# Patient Record
Sex: FEMALE | Race: OTHER | NOT HISPANIC OR LATINO | URBAN - METROPOLITAN AREA
[De-identification: names, ages, dates, MRNs, and addresses within clinical notes are randomized per-mention and may not be internally consistent; named-entity substitution may affect disease eponyms.]

---

## 2022-10-10 ENCOUNTER — INPATIENT (INPATIENT)
Facility: HOSPITAL | Age: 72
LOS: 7 days | Discharge: HOME HEALTH | End: 2022-10-18
Attending: INTERNAL MEDICINE | Admitting: INTERNAL MEDICINE
Payer: MEDICARE

## 2022-10-10 VITALS
RESPIRATION RATE: 17 BRPM | TEMPERATURE: 97 F | HEART RATE: 90 BPM | OXYGEN SATURATION: 93 % | SYSTOLIC BLOOD PRESSURE: 185 MMHG | DIASTOLIC BLOOD PRESSURE: 98 MMHG

## 2022-10-10 LAB
ALBUMIN SERPL ELPH-MCNC: 3.8 G/DL — SIGNIFICANT CHANGE UP (ref 3.3–5)
ALP SERPL-CCNC: 97 U/L — SIGNIFICANT CHANGE UP (ref 40–120)
ALT FLD-CCNC: 19 U/L — SIGNIFICANT CHANGE UP (ref 4–33)
ANION GAP SERPL CALC-SCNC: 10 MMOL/L — SIGNIFICANT CHANGE UP (ref 7–14)
APTT BLD: 30.2 SEC — SIGNIFICANT CHANGE UP (ref 27–36.3)
AST SERPL-CCNC: 20 U/L — SIGNIFICANT CHANGE UP (ref 4–32)
B PERT DNA SPEC QL NAA+PROBE: SIGNIFICANT CHANGE UP
B PERT+PARAPERT DNA PNL SPEC NAA+PROBE: SIGNIFICANT CHANGE UP
BASE EXCESS BLDV CALC-SCNC: 5.3 MMOL/L — HIGH (ref -2–3)
BASOPHILS # BLD AUTO: 0.03 K/UL — SIGNIFICANT CHANGE UP (ref 0–0.2)
BASOPHILS NFR BLD AUTO: 0.4 % — SIGNIFICANT CHANGE UP (ref 0–2)
BILIRUB SERPL-MCNC: 0.3 MG/DL — SIGNIFICANT CHANGE UP (ref 0.2–1.2)
BLOOD GAS VENOUS COMPREHENSIVE RESULT: SIGNIFICANT CHANGE UP
BORDETELLA PARAPERTUSSIS (RAPRVP): SIGNIFICANT CHANGE UP
BUN SERPL-MCNC: 14 MG/DL — SIGNIFICANT CHANGE UP (ref 7–23)
C PNEUM DNA SPEC QL NAA+PROBE: SIGNIFICANT CHANGE UP
CALCIUM SERPL-MCNC: 9.2 MG/DL — SIGNIFICANT CHANGE UP (ref 8.4–10.5)
CHLORIDE BLDV-SCNC: 99 MMOL/L — SIGNIFICANT CHANGE UP (ref 96–108)
CHLORIDE SERPL-SCNC: 101 MMOL/L — SIGNIFICANT CHANGE UP (ref 98–107)
CO2 BLDV-SCNC: 33.9 MMOL/L — HIGH (ref 22–26)
CO2 SERPL-SCNC: 29 MMOL/L — SIGNIFICANT CHANGE UP (ref 22–31)
CREAT SERPL-MCNC: 0.87 MG/DL — SIGNIFICANT CHANGE UP (ref 0.5–1.3)
EGFR: 71 ML/MIN/1.73M2 — SIGNIFICANT CHANGE UP
EOSINOPHIL # BLD AUTO: 0.2 K/UL — SIGNIFICANT CHANGE UP (ref 0–0.5)
EOSINOPHIL NFR BLD AUTO: 2.4 % — SIGNIFICANT CHANGE UP (ref 0–6)
FLUAV SUBTYP SPEC NAA+PROBE: SIGNIFICANT CHANGE UP
FLUBV RNA SPEC QL NAA+PROBE: SIGNIFICANT CHANGE UP
GAS PNL BLDV: 137 MMOL/L — SIGNIFICANT CHANGE UP (ref 136–145)
GAS PNL BLDV: SIGNIFICANT CHANGE UP
GLUCOSE BLDV-MCNC: 182 MG/DL — HIGH (ref 70–99)
GLUCOSE SERPL-MCNC: 184 MG/DL — HIGH (ref 70–99)
HADV DNA SPEC QL NAA+PROBE: SIGNIFICANT CHANGE UP
HCO3 BLDV-SCNC: 32 MMOL/L — HIGH (ref 22–29)
HCOV 229E RNA SPEC QL NAA+PROBE: SIGNIFICANT CHANGE UP
HCOV HKU1 RNA SPEC QL NAA+PROBE: SIGNIFICANT CHANGE UP
HCOV NL63 RNA SPEC QL NAA+PROBE: SIGNIFICANT CHANGE UP
HCOV OC43 RNA SPEC QL NAA+PROBE: SIGNIFICANT CHANGE UP
HCT VFR BLD CALC: 38.3 % — SIGNIFICANT CHANGE UP (ref 34.5–45)
HCT VFR BLDA CALC: 37 % — SIGNIFICANT CHANGE UP (ref 34.5–46.5)
HGB BLD CALC-MCNC: 12.3 G/DL — SIGNIFICANT CHANGE UP (ref 11.5–15.5)
HGB BLD-MCNC: 11.7 G/DL — SIGNIFICANT CHANGE UP (ref 11.5–15.5)
HMPV RNA SPEC QL NAA+PROBE: SIGNIFICANT CHANGE UP
HPIV1 RNA SPEC QL NAA+PROBE: SIGNIFICANT CHANGE UP
HPIV2 RNA SPEC QL NAA+PROBE: SIGNIFICANT CHANGE UP
HPIV3 RNA SPEC QL NAA+PROBE: SIGNIFICANT CHANGE UP
HPIV4 RNA SPEC QL NAA+PROBE: SIGNIFICANT CHANGE UP
IANC: 5.51 K/UL — SIGNIFICANT CHANGE UP (ref 1.8–7.4)
IMM GRANULOCYTES NFR BLD AUTO: 0.5 % — SIGNIFICANT CHANGE UP (ref 0–0.9)
INR BLD: 1.03 RATIO — SIGNIFICANT CHANGE UP (ref 0.88–1.16)
LACTATE BLDV-MCNC: 1.5 MMOL/L — SIGNIFICANT CHANGE UP (ref 0.5–2)
LIDOCAIN IGE QN: 38 U/L — SIGNIFICANT CHANGE UP (ref 7–60)
LYMPHOCYTES # BLD AUTO: 1.7 K/UL — SIGNIFICANT CHANGE UP (ref 1–3.3)
LYMPHOCYTES # BLD AUTO: 20.8 % — SIGNIFICANT CHANGE UP (ref 13–44)
M PNEUMO DNA SPEC QL NAA+PROBE: SIGNIFICANT CHANGE UP
MCHC RBC-ENTMCNC: 26.8 PG — LOW (ref 27–34)
MCHC RBC-ENTMCNC: 30.5 GM/DL — LOW (ref 32–36)
MCV RBC AUTO: 87.6 FL — SIGNIFICANT CHANGE UP (ref 80–100)
MONOCYTES # BLD AUTO: 0.69 K/UL — SIGNIFICANT CHANGE UP (ref 0–0.9)
MONOCYTES NFR BLD AUTO: 8.4 % — SIGNIFICANT CHANGE UP (ref 2–14)
NEUTROPHILS # BLD AUTO: 5.51 K/UL — SIGNIFICANT CHANGE UP (ref 1.8–7.4)
NEUTROPHILS NFR BLD AUTO: 67.5 % — SIGNIFICANT CHANGE UP (ref 43–77)
NRBC # BLD: 0 /100 WBCS — SIGNIFICANT CHANGE UP (ref 0–0)
NRBC # FLD: 0 K/UL — SIGNIFICANT CHANGE UP (ref 0–0)
NT-PROBNP SERPL-SCNC: 1206 PG/ML — HIGH
PCO2 BLDV: 57 MMHG — HIGH (ref 39–42)
PH BLDV: 7.36 — SIGNIFICANT CHANGE UP (ref 7.32–7.43)
PLATELET # BLD AUTO: 262 K/UL — SIGNIFICANT CHANGE UP (ref 150–400)
PO2 BLDV: 52 MMHG — SIGNIFICANT CHANGE UP
POTASSIUM BLDV-SCNC: 3.3 MMOL/L — LOW (ref 3.5–5.1)
POTASSIUM SERPL-MCNC: 3.5 MMOL/L — SIGNIFICANT CHANGE UP (ref 3.5–5.3)
POTASSIUM SERPL-SCNC: 3.5 MMOL/L — SIGNIFICANT CHANGE UP (ref 3.5–5.3)
PROT SERPL-MCNC: 6.6 G/DL — SIGNIFICANT CHANGE UP (ref 6–8.3)
PROTHROM AB SERPL-ACNC: 11.9 SEC — SIGNIFICANT CHANGE UP (ref 10.5–13.4)
RAPID RVP RESULT: SIGNIFICANT CHANGE UP
RBC # BLD: 4.37 M/UL — SIGNIFICANT CHANGE UP (ref 3.8–5.2)
RBC # FLD: 14.7 % — HIGH (ref 10.3–14.5)
RSV RNA SPEC QL NAA+PROBE: SIGNIFICANT CHANGE UP
RV+EV RNA SPEC QL NAA+PROBE: SIGNIFICANT CHANGE UP
SAO2 % BLDV: 77.5 % — SIGNIFICANT CHANGE UP
SARS-COV-2 RNA SPEC QL NAA+PROBE: SIGNIFICANT CHANGE UP
SODIUM SERPL-SCNC: 140 MMOL/L — SIGNIFICANT CHANGE UP (ref 135–145)
WBC # BLD: 8.17 K/UL — SIGNIFICANT CHANGE UP (ref 3.8–10.5)
WBC # FLD AUTO: 8.17 K/UL — SIGNIFICANT CHANGE UP (ref 3.8–10.5)

## 2022-10-10 PROCEDURE — 74177 CT ABD & PELVIS W/CONTRAST: CPT | Mod: 26,MA

## 2022-10-10 PROCEDURE — 71275 CT ANGIOGRAPHY CHEST: CPT | Mod: 26,MA

## 2022-10-10 PROCEDURE — 71045 X-RAY EXAM CHEST 1 VIEW: CPT | Mod: 26

## 2022-10-10 PROCEDURE — 99285 EMERGENCY DEPT VISIT HI MDM: CPT | Mod: CS

## 2022-10-10 RX ORDER — ACETAMINOPHEN 500 MG
975 TABLET ORAL ONCE
Refills: 0 | Status: COMPLETED | OUTPATIENT
Start: 2022-10-10 | End: 2022-10-10

## 2022-10-10 RX ORDER — METOPROLOL TARTRATE 50 MG
25 TABLET ORAL ONCE
Refills: 0 | Status: COMPLETED | OUTPATIENT
Start: 2022-10-10 | End: 2022-10-10

## 2022-10-10 RX ORDER — LOSARTAN POTASSIUM 100 MG/1
100 TABLET, FILM COATED ORAL ONCE
Refills: 0 | Status: COMPLETED | OUTPATIENT
Start: 2022-10-10 | End: 2022-10-10

## 2022-10-10 RX ORDER — HYDRALAZINE HCL 50 MG
25 TABLET ORAL ONCE
Refills: 0 | Status: COMPLETED | OUTPATIENT
Start: 2022-10-10 | End: 2022-10-10

## 2022-10-10 RX ADMIN — Medication 25 MILLIGRAM(S): at 18:11

## 2022-10-10 RX ADMIN — Medication 975 MILLIGRAM(S): at 19:00

## 2022-10-10 RX ADMIN — Medication 975 MILLIGRAM(S): at 19:07

## 2022-10-10 RX ADMIN — LOSARTAN POTASSIUM 100 MILLIGRAM(S): 100 TABLET, FILM COATED ORAL at 18:11

## 2022-10-10 NOTE — ED PROVIDER NOTE - ATTENDING CONTRIBUTION TO CARE
Brief HPI:  71y F PMHx aneurysm, HTN, DM, HLD, Covid (residual symptoms, home oxygen intermittently, diagnosed with chronic lung disease from covid), presents with cough with bloody sputum today.      Vitals:   Reviewed    Exam:    GEN:  Non-toxic appearing, non-distressed, speaking full sentences, non-diaphoretic, AAOx3  HEENT:  NCAT, neck supple, EOMI, PERRLA, sclera anicteric, no conjunctival pallor or injection, no stridor, normal voice, no tonsillar exudate, uvula midline  CV:  regular rhythm and rate, s1/s2 audible, no murmurs, rubs or gallops, peripheral pulses 2+ and symmetric  PULM:  non-labored respirations, lungs clear to auscultation bilaterally, no wheezes, crackles or rales  ABD:  non distended, non-tender, no rebound, no guarding, negative Kelly's sign, bowel sounds normal, no cvat  MSK:  no gross deformity, non-tender extremities and joints, range of motion grossly normal appearing, no extremity edema, extremities warm and well perfused   NEURO:  AAOx3, CN II-XII intact, motor 5/5 in upper and lower extremities bilaterally, sensation grossly intact in extremities and trunk, finger to nose testing wnl, no nystagmus, negative Romberg, no pronator drift, no gait deficit  SKIN:  warm, dry, no rash or vesicles     A/P:  71y F PMHx aneurysm, HTN, DM, HLD, Covid (residual symptoms, home oxygen intermittently, diagnosed with chronic lung disease from covid), coughing up blood today.  VSS.  No active hemoptysis in ED.  Lungs clear.  Possible infectious etiology, although pe also considered.  Plan for labs, cxr, ctpe, supportive care.  Dispo pending.

## 2022-10-10 NOTE — ED PROVIDER NOTE - CLINICAL SUMMARY MEDICAL DECISION MAKING FREE TEXT BOX
Patient is a 71y F PMHx aneurysm, HTN, DM, HLD, Covid (residual symptoms, home oxygen intermittently, diagnosed with chronic lung disease from covid), coughing up blood today. Patient most likely PE vs PNA. Will get labs, CTPA, CT a/p. Patient with worsening BLE edema, orthopnea, will get ekg, BNP, CXR.

## 2022-10-10 NOTE — ED ADULT NURSE NOTE - OBJECTIVE STATEMENT
assumed care of pt at 18:35. pt reports blood in sputum for last couple of months. cough and sob present upon dyspnea. 4/10 non radiating chest pain present. ekg obtained. pmh of dm, htn and hyperlipemia. denies blood thinners. anox4. rr even and unlabored. able to move extremities well. pt educated on plan of care, pt able to successfully teach back plan of care to RN, RN will continue to reeducate pt during hospital stay.

## 2022-10-10 NOTE — ED ADULT NURSE NOTE - CHIEF COMPLAINT QUOTE
pt had 2 episodes of hemoptysis, arrives with pill bottle containing sputum sample, pt requesting the lab test the specimen..  Addendum,:  Pt with high BP at triage, did not take BP  meds today, and forgot to bring meds to ED.  Dr Osman [id number] called to assess pt.  BP meds administered as per order..

## 2022-10-10 NOTE — ED PROVIDER NOTE - PHYSICAL EXAMINATION
GENERAL: no acute distress, non-toxic appearing  HEENT: PERRLA, EOMI, normal conjunctiva  CARDIAC: regular rate and rhythm  PULM: clear to ascultation bilaterally, no wheezing  GI: abdomen nondistended, soft, diffuse abdominal tenderness, no guarding or rebound tenderness  NEURO: alert and oriented x 3, normal speech, no gross neurologic deficit  MSK: no visible deformities, 2+ BLE edema, no calf tenderness/redness  SKIN: no visible rashes, dry, well-perfused  PSYCH: appropriate mood and affect

## 2022-10-10 NOTE — ED PROVIDER NOTE - PROGRESS NOTE DETAILS
patient endorsed to Dr Plascencia.    Patient's family updated with results and need for admission, agreeable.

## 2022-10-10 NOTE — ED ADULT TRIAGE NOTE - CHIEF COMPLAINT QUOTE
pt had 2 episodes of hemoptysis, arrives with pill bottle containing sputum sample, pt requesting the lab test the specimen. pt had 2 episodes of hemoptysis, arrives with pill bottle containing sputum sample, pt requesting the lab test the specimen..  Addendum,:  Pt with high BP at triage, did not take BP  meds today, and forgot to bring meds to ED.  Dr Osman [id number] called to assess pt.  BP meds administered as per order..

## 2022-10-10 NOTE — ED ADULT NURSE REASSESSMENT NOTE - NS ED NURSE REASSESS COMMENT FT1
Pt in spot 6A. Pt A&Ox4, amb with cane. Pt vitally stable. Denies chest pain, SOB, n/v/d, headache, dizziness, numbness/tingling to hands/feet. Pt currently waiting on CT scan. No complaints from pt at this jerald.e Pt bp elevated, baseline for pt, MD made aware

## 2022-10-10 NOTE — ED PROVIDER NOTE - OBJECTIVE STATEMENT
Patient is a 71y F PMHx aneurysm, HTN, DM, HLD, Covid (residual symptoms, home oxygen intermittently, diagnosed with chronic lung disease from covid), coughing up blood today. Patient states 3 weeks coughing, worsening SOB. Patient treated with azithromycin no relief. Patient with worsening bilateral lower extremity swelling. No recent travel, no hx TB, non-smoker. Denies CP, fevers, nvd.

## 2022-10-11 DIAGNOSIS — I63.9 CEREBRAL INFARCTION, UNSPECIFIED: ICD-10-CM

## 2022-10-11 DIAGNOSIS — R06.02 SHORTNESS OF BREATH: ICD-10-CM

## 2022-10-11 DIAGNOSIS — R04.2 HEMOPTYSIS: ICD-10-CM

## 2022-10-11 DIAGNOSIS — Z29.9 ENCOUNTER FOR PROPHYLACTIC MEASURES, UNSPECIFIED: ICD-10-CM

## 2022-10-11 DIAGNOSIS — I50.21 ACUTE SYSTOLIC (CONGESTIVE) HEART FAILURE: ICD-10-CM

## 2022-10-11 DIAGNOSIS — E11.65 TYPE 2 DIABETES MELLITUS WITH HYPERGLYCEMIA: ICD-10-CM

## 2022-10-11 LAB
A1C WITH ESTIMATED AVERAGE GLUCOSE RESULT: 8 % — HIGH (ref 4–5.6)
ANION GAP SERPL CALC-SCNC: 11 MMOL/L — SIGNIFICANT CHANGE UP (ref 7–14)
BASOPHILS # BLD AUTO: 0.04 K/UL — SIGNIFICANT CHANGE UP (ref 0–0.2)
BASOPHILS NFR BLD AUTO: 0.4 % — SIGNIFICANT CHANGE UP (ref 0–2)
BUN SERPL-MCNC: 12 MG/DL — SIGNIFICANT CHANGE UP (ref 7–23)
CALCIUM SERPL-MCNC: 9.1 MG/DL — SIGNIFICANT CHANGE UP (ref 8.4–10.5)
CHLORIDE SERPL-SCNC: 97 MMOL/L — LOW (ref 98–107)
CHOLEST SERPL-MCNC: 129 MG/DL — SIGNIFICANT CHANGE UP
CO2 SERPL-SCNC: 30 MMOL/L — SIGNIFICANT CHANGE UP (ref 22–31)
CREAT SERPL-MCNC: 0.79 MG/DL — SIGNIFICANT CHANGE UP (ref 0.5–1.3)
EGFR: 80 ML/MIN/1.73M2 — SIGNIFICANT CHANGE UP
EOSINOPHIL # BLD AUTO: 0.21 K/UL — SIGNIFICANT CHANGE UP (ref 0–0.5)
EOSINOPHIL NFR BLD AUTO: 1.9 % — SIGNIFICANT CHANGE UP (ref 0–6)
ESTIMATED AVERAGE GLUCOSE: 183 — SIGNIFICANT CHANGE UP
GLUCOSE BLDC GLUCOMTR-MCNC: 100 MG/DL — HIGH (ref 70–99)
GLUCOSE BLDC GLUCOMTR-MCNC: 114 MG/DL — HIGH (ref 70–99)
GLUCOSE BLDC GLUCOMTR-MCNC: 129 MG/DL — HIGH (ref 70–99)
GLUCOSE BLDC GLUCOMTR-MCNC: 150 MG/DL — HIGH (ref 70–99)
GLUCOSE SERPL-MCNC: 154 MG/DL — HIGH (ref 70–99)
HCT VFR BLD CALC: 37.9 % — SIGNIFICANT CHANGE UP (ref 34.5–45)
HDLC SERPL-MCNC: 47 MG/DL — LOW
HGB BLD-MCNC: 11.8 G/DL — SIGNIFICANT CHANGE UP (ref 11.5–15.5)
IANC: 8.14 K/UL — HIGH (ref 1.8–7.4)
IMM GRANULOCYTES NFR BLD AUTO: 0.8 % — SIGNIFICANT CHANGE UP (ref 0–0.9)
LIPID PNL WITH DIRECT LDL SERPL: 44 MG/DL — SIGNIFICANT CHANGE UP
LYMPHOCYTES # BLD AUTO: 1.68 K/UL — SIGNIFICANT CHANGE UP (ref 1–3.3)
LYMPHOCYTES # BLD AUTO: 15.2 % — SIGNIFICANT CHANGE UP (ref 13–44)
MAGNESIUM SERPL-MCNC: 1.9 MG/DL — SIGNIFICANT CHANGE UP (ref 1.6–2.6)
MCHC RBC-ENTMCNC: 26.8 PG — LOW (ref 27–34)
MCHC RBC-ENTMCNC: 31.1 GM/DL — LOW (ref 32–36)
MCV RBC AUTO: 86.1 FL — SIGNIFICANT CHANGE UP (ref 80–100)
MONOCYTES # BLD AUTO: 0.87 K/UL — SIGNIFICANT CHANGE UP (ref 0–0.9)
MONOCYTES NFR BLD AUTO: 7.9 % — SIGNIFICANT CHANGE UP (ref 2–14)
NEUTROPHILS # BLD AUTO: 8.14 K/UL — HIGH (ref 1.8–7.4)
NEUTROPHILS NFR BLD AUTO: 73.8 % — SIGNIFICANT CHANGE UP (ref 43–77)
NON HDL CHOLESTEROL: 82 MG/DL — SIGNIFICANT CHANGE UP
NRBC # BLD: 0 /100 WBCS — SIGNIFICANT CHANGE UP (ref 0–0)
NRBC # FLD: 0 K/UL — SIGNIFICANT CHANGE UP (ref 0–0)
PHOSPHATE SERPL-MCNC: 2.7 MG/DL — SIGNIFICANT CHANGE UP (ref 2.5–4.5)
PLATELET # BLD AUTO: 261 K/UL — SIGNIFICANT CHANGE UP (ref 150–400)
POTASSIUM SERPL-MCNC: 3.3 MMOL/L — LOW (ref 3.5–5.3)
POTASSIUM SERPL-SCNC: 3.3 MMOL/L — LOW (ref 3.5–5.3)
RBC # BLD: 4.4 M/UL — SIGNIFICANT CHANGE UP (ref 3.8–5.2)
RBC # FLD: 14.6 % — HIGH (ref 10.3–14.5)
SODIUM SERPL-SCNC: 138 MMOL/L — SIGNIFICANT CHANGE UP (ref 135–145)
TRIGL SERPL-MCNC: 191 MG/DL — HIGH
TROPONIN T, HIGH SENSITIVITY RESULT: 24 NG/L — SIGNIFICANT CHANGE UP
TSH SERPL-MCNC: 3.22 UIU/ML — SIGNIFICANT CHANGE UP (ref 0.27–4.2)
WBC # BLD: 11.03 K/UL — HIGH (ref 3.8–10.5)
WBC # FLD AUTO: 11.03 K/UL — HIGH (ref 3.8–10.5)

## 2022-10-11 PROCEDURE — 99223 1ST HOSP IP/OBS HIGH 75: CPT

## 2022-10-11 PROCEDURE — 93306 TTE W/DOPPLER COMPLETE: CPT | Mod: 26

## 2022-10-11 RX ORDER — DEXTROSE 50 % IN WATER 50 %
12.5 SYRINGE (ML) INTRAVENOUS ONCE
Refills: 0 | Status: DISCONTINUED | OUTPATIENT
Start: 2022-10-11 | End: 2022-10-11

## 2022-10-11 RX ORDER — INSULIN LISPRO 100/ML
VIAL (ML) SUBCUTANEOUS
Refills: 0 | Status: DISCONTINUED | OUTPATIENT
Start: 2022-10-11 | End: 2022-10-18

## 2022-10-11 RX ORDER — HYDRALAZINE HCL 50 MG
1 TABLET ORAL
Qty: 0 | Refills: 0 | DISCHARGE

## 2022-10-11 RX ORDER — METOPROLOL TARTRATE 50 MG
1 TABLET ORAL
Qty: 0 | Refills: 0 | DISCHARGE

## 2022-10-11 RX ORDER — GLUCAGON INJECTION, SOLUTION 0.5 MG/.1ML
1 INJECTION, SOLUTION SUBCUTANEOUS ONCE
Refills: 0 | Status: DISCONTINUED | OUTPATIENT
Start: 2022-10-11 | End: 2022-10-18

## 2022-10-11 RX ORDER — INSULIN GLARGINE 100 [IU]/ML
25 INJECTION, SOLUTION SUBCUTANEOUS
Qty: 0 | Refills: 0 | DISCHARGE

## 2022-10-11 RX ORDER — DEXTROSE 50 % IN WATER 50 %
12.5 SYRINGE (ML) INTRAVENOUS ONCE
Refills: 0 | Status: DISCONTINUED | OUTPATIENT
Start: 2022-10-11 | End: 2022-10-18

## 2022-10-11 RX ORDER — LOSARTAN POTASSIUM 100 MG/1
1 TABLET, FILM COATED ORAL
Qty: 0 | Refills: 0 | DISCHARGE

## 2022-10-11 RX ORDER — DEXTROSE 50 % IN WATER 50 %
15 SYRINGE (ML) INTRAVENOUS ONCE
Refills: 0 | Status: DISCONTINUED | OUTPATIENT
Start: 2022-10-11 | End: 2022-10-11

## 2022-10-11 RX ORDER — INSULIN LISPRO 100/ML
10 VIAL (ML) SUBCUTANEOUS
Refills: 0 | Status: DISCONTINUED | OUTPATIENT
Start: 2022-10-11 | End: 2022-10-18

## 2022-10-11 RX ORDER — DEXTROSE 50 % IN WATER 50 %
25 SYRINGE (ML) INTRAVENOUS ONCE
Refills: 0 | Status: DISCONTINUED | OUTPATIENT
Start: 2022-10-11 | End: 2022-10-11

## 2022-10-11 RX ORDER — BUDESONIDE AND FORMOTEROL FUMARATE DIHYDRATE 160; 4.5 UG/1; UG/1
2 AEROSOL RESPIRATORY (INHALATION)
Refills: 0 | Status: DISCONTINUED | OUTPATIENT
Start: 2022-10-11 | End: 2022-10-18

## 2022-10-11 RX ORDER — LOSARTAN POTASSIUM 100 MG/1
100 TABLET, FILM COATED ORAL DAILY
Refills: 0 | Status: DISCONTINUED | OUTPATIENT
Start: 2022-10-11 | End: 2022-10-18

## 2022-10-11 RX ORDER — ONDANSETRON 8 MG/1
4 TABLET, FILM COATED ORAL EVERY 8 HOURS
Refills: 0 | Status: DISCONTINUED | OUTPATIENT
Start: 2022-10-11 | End: 2022-10-18

## 2022-10-11 RX ORDER — ALBUTEROL 90 UG/1
1 AEROSOL, METERED ORAL EVERY 6 HOURS
Refills: 0 | Status: DISCONTINUED | OUTPATIENT
Start: 2022-10-11 | End: 2022-10-18

## 2022-10-11 RX ORDER — ACETAMINOPHEN 500 MG
650 TABLET ORAL EVERY 6 HOURS
Refills: 0 | Status: DISCONTINUED | OUTPATIENT
Start: 2022-10-11 | End: 2022-10-18

## 2022-10-11 RX ORDER — ASPIRIN/CALCIUM CARB/MAGNESIUM 324 MG
1 TABLET ORAL
Qty: 0 | Refills: 0 | DISCHARGE

## 2022-10-11 RX ORDER — INSULIN LISPRO 100/ML
VIAL (ML) SUBCUTANEOUS AT BEDTIME
Refills: 0 | Status: DISCONTINUED | OUTPATIENT
Start: 2022-10-11 | End: 2022-10-18

## 2022-10-11 RX ORDER — INSULIN LISPRO 100/ML
25 VIAL (ML) SUBCUTANEOUS
Refills: 0 | Status: DISCONTINUED | OUTPATIENT
Start: 2022-10-11 | End: 2022-10-11

## 2022-10-11 RX ORDER — POTASSIUM CHLORIDE 20 MEQ
40 PACKET (EA) ORAL EVERY 4 HOURS
Refills: 0 | Status: COMPLETED | OUTPATIENT
Start: 2022-10-11 | End: 2022-10-11

## 2022-10-11 RX ORDER — METOPROLOL TARTRATE 50 MG
25 TABLET ORAL
Refills: 0 | Status: DISCONTINUED | OUTPATIENT
Start: 2022-10-11 | End: 2022-10-18

## 2022-10-11 RX ORDER — DEXTROSE 50 % IN WATER 50 %
25 SYRINGE (ML) INTRAVENOUS ONCE
Refills: 0 | Status: DISCONTINUED | OUTPATIENT
Start: 2022-10-11 | End: 2022-10-18

## 2022-10-11 RX ORDER — ATORVASTATIN CALCIUM 80 MG/1
1 TABLET, FILM COATED ORAL
Qty: 0 | Refills: 0 | DISCHARGE

## 2022-10-11 RX ORDER — GLUCAGON INJECTION, SOLUTION 0.5 MG/.1ML
1 INJECTION, SOLUTION SUBCUTANEOUS ONCE
Refills: 0 | Status: DISCONTINUED | OUTPATIENT
Start: 2022-10-11 | End: 2022-10-11

## 2022-10-11 RX ORDER — SODIUM CHLORIDE 9 MG/ML
1000 INJECTION, SOLUTION INTRAVENOUS
Refills: 0 | Status: DISCONTINUED | OUTPATIENT
Start: 2022-10-11 | End: 2022-10-18

## 2022-10-11 RX ORDER — FUROSEMIDE 40 MG
20 TABLET ORAL EVERY 12 HOURS
Refills: 0 | Status: DISCONTINUED | OUTPATIENT
Start: 2022-10-11 | End: 2022-10-12

## 2022-10-11 RX ORDER — HYDRALAZINE HCL 50 MG
25 TABLET ORAL THREE TIMES A DAY
Refills: 0 | Status: DISCONTINUED | OUTPATIENT
Start: 2022-10-11 | End: 2022-10-18

## 2022-10-11 RX ORDER — ACETAMINOPHEN 500 MG
1000 TABLET ORAL ONCE
Refills: 0 | Status: COMPLETED | OUTPATIENT
Start: 2022-10-11 | End: 2022-10-11

## 2022-10-11 RX ORDER — INSULIN LISPRO 100/ML
VIAL (ML) SUBCUTANEOUS
Refills: 0 | Status: DISCONTINUED | OUTPATIENT
Start: 2022-10-11 | End: 2022-10-11

## 2022-10-11 RX ORDER — LANOLIN ALCOHOL/MO/W.PET/CERES
3 CREAM (GRAM) TOPICAL AT BEDTIME
Refills: 0 | Status: DISCONTINUED | OUTPATIENT
Start: 2022-10-11 | End: 2022-10-18

## 2022-10-11 RX ORDER — INSULIN ASPART 100 [IU]/ML
25 INJECTION, SOLUTION SUBCUTANEOUS
Qty: 0 | Refills: 0 | DISCHARGE

## 2022-10-11 RX ORDER — INSULIN GLARGINE 100 [IU]/ML
32 INJECTION, SOLUTION SUBCUTANEOUS EVERY MORNING
Refills: 0 | Status: DISCONTINUED | OUTPATIENT
Start: 2022-10-11 | End: 2022-10-18

## 2022-10-11 RX ORDER — ATORVASTATIN CALCIUM 80 MG/1
40 TABLET, FILM COATED ORAL AT BEDTIME
Refills: 0 | Status: DISCONTINUED | OUTPATIENT
Start: 2022-10-11 | End: 2022-10-18

## 2022-10-11 RX ORDER — FUROSEMIDE 40 MG
40 TABLET ORAL ONCE
Refills: 0 | Status: COMPLETED | OUTPATIENT
Start: 2022-10-11 | End: 2022-10-11

## 2022-10-11 RX ORDER — ASPIRIN/CALCIUM CARB/MAGNESIUM 324 MG
81 TABLET ORAL DAILY
Refills: 0 | Status: DISCONTINUED | OUTPATIENT
Start: 2022-10-11 | End: 2022-10-11

## 2022-10-11 RX ORDER — DEXTROSE 50 % IN WATER 50 %
15 SYRINGE (ML) INTRAVENOUS ONCE
Refills: 0 | Status: DISCONTINUED | OUTPATIENT
Start: 2022-10-11 | End: 2022-10-18

## 2022-10-11 RX ORDER — INSULIN GLARGINE 100 [IU]/ML
40 INJECTION, SOLUTION SUBCUTANEOUS
Qty: 0 | Refills: 0 | DISCHARGE

## 2022-10-11 RX ORDER — INSULIN LISPRO 100/ML
VIAL (ML) SUBCUTANEOUS AT BEDTIME
Refills: 0 | Status: DISCONTINUED | OUTPATIENT
Start: 2022-10-11 | End: 2022-10-11

## 2022-10-11 RX ORDER — INSULIN GLARGINE 100 [IU]/ML
40 INJECTION, SOLUTION SUBCUTANEOUS AT BEDTIME
Refills: 0 | Status: DISCONTINUED | OUTPATIENT
Start: 2022-10-11 | End: 2022-10-11

## 2022-10-11 RX ADMIN — Medication 20 MILLIGRAM(S): at 10:47

## 2022-10-11 RX ADMIN — Medication 1000 MILLIGRAM(S): at 13:00

## 2022-10-11 RX ADMIN — Medication 400 MILLIGRAM(S): at 12:35

## 2022-10-11 RX ADMIN — Medication 25 MILLIGRAM(S): at 15:58

## 2022-10-11 RX ADMIN — Medication 20 MILLIGRAM(S): at 22:07

## 2022-10-11 RX ADMIN — Medication 10 UNIT(S): at 09:23

## 2022-10-11 RX ADMIN — LOSARTAN POTASSIUM 100 MILLIGRAM(S): 100 TABLET, FILM COATED ORAL at 10:46

## 2022-10-11 RX ADMIN — Medication 25 MILLIGRAM(S): at 22:07

## 2022-10-11 RX ADMIN — ATORVASTATIN CALCIUM 40 MILLIGRAM(S): 80 TABLET, FILM COATED ORAL at 22:07

## 2022-10-11 RX ADMIN — Medication 10 UNIT(S): at 13:31

## 2022-10-11 RX ADMIN — Medication 81 MILLIGRAM(S): at 10:46

## 2022-10-11 RX ADMIN — Medication 40 MILLIGRAM(S): at 04:24

## 2022-10-11 RX ADMIN — Medication 25 MILLIGRAM(S): at 19:13

## 2022-10-11 RX ADMIN — INSULIN GLARGINE 32 UNIT(S): 100 INJECTION, SOLUTION SUBCUTANEOUS at 10:08

## 2022-10-11 RX ADMIN — Medication 40 MILLIEQUIVALENT(S): at 15:58

## 2022-10-11 RX ADMIN — Medication 40 MILLIEQUIVALENT(S): at 12:35

## 2022-10-11 NOTE — CONSULT NOTE ADULT - NS ATTEND AMEND GEN_ALL_CORE FT
Patient seen and examined agree with above note as modified, where appropriate, by me. hemoptysis, now resolved. will plan for Bronchoscopy.

## 2022-10-11 NOTE — H&P ADULT - PROBLEM SELECTOR PLAN 2
Acute; CTA chest: No main, right main, left main, lobar or segmental pulmonary embolism. Limited evaluation of subsegmental pulmonary arteries secondary to motion and mixing artifact. Patent central airways. Diffuse bilateral   groundglass opacities and interlobular septal thickening consistent with pulmonary edema. No pleural effusion.  -Hold Heparin for DVT  -Pulmonology c/s in AM  -Supp O2 NC  -VS q4h  -Elev HOB

## 2022-10-11 NOTE — H&P ADULT - NSICDXFAMILYHX_GEN_ALL_CORE_FT
FAMILY HISTORY:  Father  Still living? Unknown  FH: lung cancer, Age at diagnosis: Age Unknown    Mother  Still living? Unknown  FH: diabetes mellitus, Age at diagnosis: Age Unknown    Sibling  Still living? Unknown  FH: diabetes mellitus, Age at diagnosis: Age Unknown    Grandparent  Still living? Unknown  FH: diabetes mellitus, Age at diagnosis: Age Unknown

## 2022-10-11 NOTE — H&P ADULT - NSHPLABSRESULTS_GEN_ALL_CORE
.    -personally interpreted EKG:    -personally interpreted CXR: pulmonary edema, no pleural effusions     -personally reviewed labs:                          11.7   8.17  )-----------( 262      ( 10 Oct 2022 18:50 )             38.3   10-10    140  |  101  |  14  ----------------------------<  184<H>  3.5   |  29  |  0.87    Ca    9.2      10 Oct 2022 18:50    TPro  6.6  /  Alb  3.8  /  TBili  0.3  /  DBili  x   /  AST  20  /  ALT  19  /  AlkPhos  97  10-10    -personally reviewed:  CT ABDOMEN AND PELVIS IC  CT ANGIO CHEST PULM ART [facility]  10/10/2022  CHEST:  LUNGS AND LARGE AIRWAYS: Patent central airways. Diffuse bilateral groundglass opacities and interlobular septal thickening consistent with pulmonary edema.  PLEURA: No pleural effusion.  VESSELS: Aortic and coronary artery calcifications. No main, right main, left main, lobar or segmental pulmonary embolism. Limited evaluation of subsegmental pulmonary arteries secondary to motion and mixing artifact.  HEART: Cardiomegaly. Trace pericardial effusion. Mitral valve annular calcifications.  MEDIASTINUM AND YAAKOV: Prominent mediastinal lymph nodes measure less than 1.5 cm in short axis diameter.  CHEST WALL AND LOWER NECK: Within normal limits.  ABDOMEN AND PELVIS:  LIVER: Within normal limits.  BILE DUCTS: Normal caliber.  GALLBLADDER: Within normal limits.  SPLEEN: Within normal limits.  PANCREAS: Within normal limits.  ADRENALS: Within normal limits.  KIDNEYS/URETERS: Left renal cysts and additional bilateral hypodensities too small characterize. No hydronephrosis.  BLADDER: Within normal limits.  REPRODUCTIVE ORGANS: Fibroid uterus. A 4.0 cm right adnexal cyst.  BOWEL: No bowel obstruction. Appendix is normal.  PERITONEUM: No ascites.  VESSELS: Atherosclerotic changes.  RETROPERITONEUM/LYMPH NODES: No lymphadenopathy.  ABDOMINAL WALL: Small fat-containing umbilical hernia.  BONES: Degenerative changes.  IMPRESSION:  Cardiomegaly and moderate pulmonary edema.  No main, right main, left main, lobar or segmental pulmonary embolism. Limited evaluation of subsegmental pulmonary arteries secondary to motion and mixing artifact.. .    -personally interpreted EKG: NSR, 67bpm, HFu=788, biphasic Tw in I, aVL, no acute ST changes, no PACs, no PVCs    -personally interpreted CXR: pulmonary edema, no pleural effusions     -personally reviewed labs:                          11.7   8.17  )-----------( 262      ( 10 Oct 2022 18:50 )             38.3   10-10    140  |  101  |  14  ----------------------------<  184<H>  3.5   |  29  |  0.87    Ca    9.2      10 Oct 2022 18:50    TPro  6.6  /  Alb  3.8  /  TBili  0.3  /  DBili  x   /  AST  20  /  ALT  19  /  AlkPhos  97  10-10    -personally reviewed:  CT ABDOMEN AND PELVIS IC  CT ANGIO CHEST PULM ART [facility]  10/10/2022  CHEST:  LUNGS AND LARGE AIRWAYS: Patent central airways. Diffuse bilateral groundglass opacities and interlobular septal thickening consistent with pulmonary edema.  PLEURA: No pleural effusion.  VESSELS: Aortic and coronary artery calcifications. No main, right main, left main, lobar or segmental pulmonary embolism. Limited evaluation of subsegmental pulmonary arteries secondary to motion and mixing artifact.  HEART: Cardiomegaly. Trace pericardial effusion. Mitral valve annular calcifications.  MEDIASTINUM AND YAAKOV: Prominent mediastinal lymph nodes measure less than 1.5 cm in short axis diameter.  CHEST WALL AND LOWER NECK: Within normal limits.  ABDOMEN AND PELVIS:  LIVER: Within normal limits.  BILE DUCTS: Normal caliber.  GALLBLADDER: Within normal limits.  SPLEEN: Within normal limits.  PANCREAS: Within normal limits.  ADRENALS: Within normal limits.  KIDNEYS/URETERS: Left renal cysts and additional bilateral hypodensities too small characterize. No hydronephrosis.  BLADDER: Within normal limits.  REPRODUCTIVE ORGANS: Fibroid uterus. A 4.0 cm right adnexal cyst.  BOWEL: No bowel obstruction. Appendix is normal.  PERITONEUM: No ascites.  VESSELS: Atherosclerotic changes.  RETROPERITONEUM/LYMPH NODES: No lymphadenopathy.  ABDOMINAL WALL: Small fat-containing umbilical hernia.  BONES: Degenerative changes.  IMPRESSION:  Cardiomegaly and moderate pulmonary edema.  No main, right main, left main, lobar or segmental pulmonary embolism. Limited evaluation of subsegmental pulmonary arteries secondary to motion and mixing artifact..

## 2022-10-11 NOTE — H&P ADULT - PROBLEM SELECTOR PLAN 1
Radiologic evidence of cardiomegaly and moderate pulmonary edema. b/l LE edema; ProBNP elevated 1.2K   -Telemetry  -TTE ordered   -Daily weights  -TSH, Lipid panel, A1c  -Cardiology, Dr. Plascencia, will take over the care in AM  -Started Lasix 20mg IVP BID x4 doses (received 40mg in ED will excessive urination)   -Strict I/O  -Oxygen PRN, NC

## 2022-10-11 NOTE — H&P ADULT - MUSCULOSKELETAL
no joint swelling/strength 5/5 bilateral upper extremities/strength 5/5 bilateral lower extremities details…

## 2022-10-11 NOTE — H&P ADULT - NSHPPHYSICALEXAM_GEN_ALL_CORE
Vital Signs Last 24 Hrs  T(C): 36.7 (10 Oct 2022 22:09), Max: 37.1 (10 Oct 2022 15:45)  T(F): 98.1 (10 Oct 2022 22:09), Max: 98.8 (10 Oct 2022 15:45)  HR: 84 (10 Oct 2022 22:09) (74 - 91)  BP: 171/100 (10 Oct 2022 22:09) (171/100 - 185/98)  BP(mean): --  RR: 18 (10 Oct 2022 22:09) (17 - 18)  SpO2: 94% (10 Oct 2022 22:09) (93% - 97%)    Parameters below as of 10 Oct 2022 22:09  Patient On (Oxygen Delivery Method): room air

## 2022-10-11 NOTE — CHART NOTE - NSCHARTNOTEFT_GEN_A_CORE
[facility]IC[facility] COVERAGE - ED HOLDS    Daughter provided update at pt's bedside regarding pt's plan of care, all questions answered.  Pt stable at this time, will continue to monitor.    Babatunde Mahan PA-C  Department of Medicine - [facility]  In-House Pager: #[phone]

## 2022-10-11 NOTE — H&P ADULT - NSHPSOCIALHISTORY_GEN_ALL_CORE
Lives with spouse and daughter   reports no h/o tobacco, alcohol or illit drugs use  ambulatory with a cane   retired

## 2022-10-11 NOTE — H&P ADULT - HISTORY OF PRESENT ILLNESS
70yo Female with MHx aneurysm, HTN, DM Type 2, HLD, Covid (residual symptoms, home oxygen intermittently, diagnosed with chronic lung disease from covid) c/o coughing up blood today. No prior episodes. Reports 3 weeks of coughing with worsening SOB. Repors also associated worsening bilateral lower extremity swelling. Denies palpitations, CP, fevers, Abd pain, n/v/d;  Patient treated with azithromycin no relief. No recent travel, no hx TB, non-smoker. 72yo Female with MHx aneurysm, HTN, DM Type 2, HLD, Covid (residual symptoms, home oxygen intermittently, diagnosed with chronic lung disease from covid) c/o coughing up bright red blood today. Pt provides a white towel with several bloody stains (2x2cm) from home and a bottle with a blood clot (0.5x2cm in size).  No prior episodes like that in the past. Reports 3 weeks of coughing with worsening SOB. Also reports associated worsening bilateral lower extremity swelling. States has had no palpitations, CP, fevers, Abd pain, n/v/d;  Patient treated with azithromycin no relief. No recent travel, no hx of TB, non-smoker. 70yo Female with MHx aneurysm, HTN, DM Type 2, HLD, h/o Covid-19  (residual symptoms, home oxygen intermittently, NC 2L/min PRN, diagnosed with chronic lung disease 2/2 covid-19) c/o coughing up bright red blood today x 2 episodes. Pt provides a white towel with several bloody stains (2x2cm) from home and a bottle with a blood clot (0.5x2cm in size).  No prior episodes like that in the past. Reports 3 weeks of coughing with worsening SOB. Also reports associated worsening bilateral lower extremity swelling. States has had no palpitations, CP, fevers, Abd pain, n/v/d;  Patient treated with azithromycin no relief. No recent travel, no hx of TB, non-smoker. 72yo Female with MHx aneurysm, HTN, DM Type 2, HLD, h/o Covid-19  (residual symptoms, home oxygen intermittently, NC 2L/min PRN, diagnosed with chronic lung disease 2/2 covid-19) c/o coughing up bright red blood today x 2 episodes. Pt provides a white towel with several bloody stains (2x2cm) from home and a bottle with a blood clot (0.5x2cm in size).  No prior episodes like that in the past. Reports 3 weeks of coughing with worsening SOB. Also reports associated worsening bilateral lower extremity swelling. States has had no palpitations, CP, fevers, Abd pain, n/v/d; Of note, patient was treated with Azithromycin x 5 days by PCP with no relief. Reports no recent travel, no hx of TB, non-smoker.

## 2022-10-11 NOTE — H&P ADULT - PROBLEM SELECTOR PLAN 3
-Conservative insulin dosage: Lantus 40u -->32u in AM, Novolog 25u TID --> 10u TID while inpt  -monitor closely FS (pt states that skips insulin sometimes due to low BG)   -moderate IASS  -a1c  -DM diet

## 2022-10-11 NOTE — CONSULT NOTE ADULT - SUBJECTIVE AND OBJECTIVE BOX
HPI:  70yo Female with MHx aneurysm, HTN, DM Type 2, HLD, h/o Covid-19  (residual symptoms, home oxygen intermittently, NC 2L/min PRN, diagnosed with chronic lung disease 2/2 covid-19) c/o coughing up bright red blood today x 2 episodes. Pt provides a white towel with several bloody stains (2x2cm) from home and a bottle with a blood clot (0.5x2cm in size).  No prior episodes like that in the past. Reports 3 weeks of coughing with worsening SOB. Also reports associated worsening bilateral lower extremity swelling. States has had no palpitations, CP, fevers, Abd pain, n/v/d; Of note, patient was treated with Azithromycin x 5 days by PCP with no relief. Reports no recent travel, no hx of TB, non-smoker. (11 Oct 2022 04:44)    PAST MEDICAL & SURGICAL HISTORY:  Diabetes mellitus  Hypertension  COVID-19  No significant past surgical history    pertinent REVIEW OF SYSTEMS mentioned above.     Allergic/Immunologic:	 No Anaphylaxis/ Intolerance/ Recent illnesses    MEDICATIONS  (STANDING):  atorvastatin 40 milliGRAM(s) Oral at bedtime  budesonide 160 MICROgram(s)/formoterol 4.5 MICROgram(s) Inhaler 2 Puff(s) Inhalation two times a day  dextrose 5%. 1000 milliLiter(s) (50 mL/Hr) IV Continuous <Continuous>  dextrose 5%. 1000 milliLiter(s) (100 mL/Hr) IV Continuous <Continuous>  dextrose 50% Injectable 25 Gram(s) IV Push once  dextrose 50% Injectable 12.5 Gram(s) IV Push once  dextrose 50% Injectable 25 Gram(s) IV Push once  furosemide   Injectable 20 milliGRAM(s) IV Push every 12 hours  glucagon  Injectable 1 milliGRAM(s) IntraMuscular once  hydrALAZINE 25 milliGRAM(s) Oral three times a day  insulin glargine Injectable (LANTUS) 32 Unit(s) SubCutaneous every morning  insulin lispro (ADMELOG) corrective regimen sliding scale   SubCutaneous three times a day before meals  insulin lispro (ADMELOG) corrective regimen sliding scale   SubCutaneous at bedtime  insulin lispro Injectable (ADMELOG) 10 Unit(s) SubCutaneous three times a day before meals  losartan 100 milliGRAM(s) Oral daily  metoprolol tartrate 25 milliGRAM(s) Oral two times a day  potassium chloride   Powder 40 milliEquivalent(s) Oral every 4 hours    MEDICATIONS  (PRN):  acetaminophen     Tablet .. 650 milliGRAM(s) Oral every 6 hours PRN Temp greater or equal to 38C (100.4F), Mild Pain (1 - 3)  ALBUTerol    90 MICROgram(s) HFA Inhaler 1 Puff(s) Inhalation every 6 hours PRN Shortness of Breath  dextrose Oral Gel 15 Gram(s) Oral once PRN Blood Glucose LESS THAN 70 milliGRAM(s)/deciliter  melatonin 3 milliGRAM(s) Oral at bedtime PRN Insomnia  ondansetron Injectable 4 milliGRAM(s) IV Push every 8 hours PRN Nausea and/or Vomiting    Allergies  No Known Allergies  Intolerances    SOCIAL HISTORY:  Smoking Hx: denies  Etoh Hx: denies  IVDA Hx: denies    FAMILY HISTORY:  FH: lung cancer (Father)  smoker    FH: diabetes mellitus (Mother, Sibling, Grandparent)    unless noted, no significant family hx with Mother, Father, Siblings    Vital Signs Last 24 Hrs  T(C): 37.2 (11 Oct 2022 06:59), Max: 37.2 (11 Oct 2022 06:59)  T(F): 99 (11 Oct 2022 06:59), Max: 99 (11 Oct 2022 06:59)  HR: 92 (11 Oct 2022 10:58) (74 - 95)  BP: 170/92 (11 Oct 2022 10:58) (162/92 - 184/109)  BP(mean): --  RR: 18 (11 Oct 2022 10:58) (17 - 20)  SpO2: 100% (11 Oct 2022 10:58) (94% - 100%)    Parameters below as of 11 Oct 2022 10:58  Patient On (Oxygen Delivery Method): room air    General: WN/WD NAD  Neurology: Awake, nonfocal, DAILY x 4  Eyes: Scleras clear, PERRLA/ EOMI, Gross vision intact  ENT:Gross hearing intact, grossly patent pharynx, no stridor  Neck: Neck supple, trachea midline, No JVD,   Respiratory: CTA B/L, No wheezing, rales, rhonchi  CV: RRR, S1S2, no murmurs, rubs or gallops  Abdominal: Soft, NT, ND +BS,   Extremities: No edema, + peripheral pulses  Skin: No Rashes, Hematoma, Ecchymosis  Lymphatic: No Neck, axilla, groin LAD  Psych: Oriented x 3, normal affect  Incisions:   Tubes:    LABS:                        11.8   11.03 )-----------( 261      ( 11 Oct 2022 07:14 )             37.9     10-11    138  |  97<L>  |  12  ----------------------------<  154<H>  3.3<L>   |  30  |  0.79    Ca    9.1      11 Oct 2022 07:14  Phos  2.7     10-11  Mg     1.90     10-11    TPro  6.6  /  Alb  3.8  /  TBili  0.3  /  DBili  x   /  AST  20  /  ALT  19  /  AlkPhos  97  10-10    PT/INR - ( 10 Oct 2022 21:12 )   PT: 11.9 sec;   INR: 1.03 ratio         PTT - ( 10 Oct 2022 21:12 )  PTT:30.2 sec      RADIOLOGY & ADDITIONAL STUDIES:    ASSESSMENT:   71yFemalePAST MEDICAL & SURGICAL HISTORY:  Diabetes mellitus      Hypertension      COVID-19      No significant past surgical history      HEALTH ISSUES - PROBLEM Dx:  Type 2 diabetes mellitus with hyperglycemia, with long-term current use of insulin    Hemoptysis    Acute systolic congestive heart failure    DVT prophylaxis    Stroke        HEALTH ISSUES - R/O PROBLEM Dx:      PLAN: HPI:  70yo Female with MHx aneurysm, HTN, DM Type 2, HLD, h/o Covid-19  (residual symptoms, home oxygen intermittently, NC 2L/min PRN, diagnosed with chronic lung disease 2/2 covid-19) c/o coughing up bright red blood today x 2 episodes. Pt provides a white towel with several bloody stains (2x2cm) from home and a bottle with a blood clot (0.5x2cm in size). Pt has had 3 months of bloody sputum and has 2 episodes per month. Uses home oxygen (2 L) intermittently throughout the day since Covid infection in March 2021.  Reports cough and SOB have not gotten any better or worse over the last few weeks. Reports bilateral lower extremity edema has improved over the last few weeks. States has had no palpitations, CP, fevers, Abd pain, n/v/d. Denies history of tobacco use, alcohol use, and recreational drug use. Lives at home with .    PAST MEDICAL & SURGICAL HISTORY:  Diabetes mellitus  Hypertension  COVID-19  No significant past surgical history    pertinent REVIEW OF SYSTEMS mentioned above.     Allergic/Immunologic:	 No Anaphylaxis/ Intolerance/ Recent illnesses    MEDICATIONS  (STANDING):  atorvastatin 40 milliGRAM(s) Oral at bedtime  budesonide 160 MICROgram(s)/formoterol 4.5 MICROgram(s) Inhaler 2 Puff(s) Inhalation two times a day  dextrose 5%. 1000 milliLiter(s) (50 mL/Hr) IV Continuous <Continuous>  dextrose 5%. 1000 milliLiter(s) (100 mL/Hr) IV Continuous <Continuous>  dextrose 50% Injectable 25 Gram(s) IV Push once  dextrose 50% Injectable 12.5 Gram(s) IV Push once  dextrose 50% Injectable 25 Gram(s) IV Push once  furosemide   Injectable 20 milliGRAM(s) IV Push every 12 hours  glucagon  Injectable 1 milliGRAM(s) IntraMuscular once  hydrALAZINE 25 milliGRAM(s) Oral three times a day  insulin glargine Injectable (LANTUS) 32 Unit(s) SubCutaneous every morning  insulin lispro (ADMELOG) corrective regimen sliding scale   SubCutaneous three times a day before meals  insulin lispro (ADMELOG) corrective regimen sliding scale   SubCutaneous at bedtime  insulin lispro Injectable (ADMELOG) 10 Unit(s) SubCutaneous three times a day before meals  losartan 100 milliGRAM(s) Oral daily  metoprolol tartrate 25 milliGRAM(s) Oral two times a day  potassium chloride   Powder 40 milliEquivalent(s) Oral every 4 hours    MEDICATIONS  (PRN):  acetaminophen     Tablet .. 650 milliGRAM(s) Oral every 6 hours PRN Temp greater or equal to 38C (100.4F), Mild Pain (1 - 3)  ALBUTerol    90 MICROgram(s) HFA Inhaler 1 Puff(s) Inhalation every 6 hours PRN Shortness of Breath  dextrose Oral Gel 15 Gram(s) Oral once PRN Blood Glucose LESS THAN 70 milliGRAM(s)/deciliter  melatonin 3 milliGRAM(s) Oral at bedtime PRN Insomnia  ondansetron Injectable 4 milliGRAM(s) IV Push every 8 hours PRN Nausea and/or Vomiting    Allergies  No Known Allergies  Intolerances    SOCIAL HISTORY:  Smoking Hx: denies  Etoh Hx: denies  IVDA Hx: denies    FAMILY HISTORY:  FH: lung cancer (Father)  smoker    FH: diabetes mellitus (Mother, Sibling, Grandparent)    unless noted, no significant family hx with Mother, Father, Siblings    Vital Signs Last 24 Hrs  T(C): 37.2 (11 Oct 2022 06:59), Max: 37.2 (11 Oct 2022 06:59)  T(F): 99 (11 Oct 2022 06:59), Max: 99 (11 Oct 2022 06:59)  HR: 92 (11 Oct 2022 10:58) (74 - 95)  BP: 170/92 (11 Oct 2022 10:58) (162/92 - 184/109)  BP(mean): --  RR: 18 (11 Oct 2022 10:58) (17 - 20)  SpO2: 100% (11 Oct 2022 10:58) (94% - 100%)    Parameters below as of 11 Oct 2022 10:58  Patient On (Oxygen Delivery Method): room air    General: WN/WD NAD  Neurology: Awake, nonfocal, DAILY x 4  Eyes: Scleras clear, PERRLA/ EOMI, Gross vision intact  ENT:Gross hearing intact, grossly patent pharynx, no stridor  Neck: Neck supple, trachea midline, No JVD,   Respiratory: CTA B/L, No wheezing, rales, rhonchi  CV: RRR, S1S2, no murmurs, rubs or gallops  Abdominal: Soft, NT, ND +BS,   Extremities: No edema, + peripheral pulses  Skin: No Rashes, Hematoma, Ecchymosis  Lymphatic: No Neck, axilla, groin LAD  Psych: Oriented x 3, normal affect  Incisions:   Tubes:    LABS:                        11.8   11.03 )-----------( 261      ( 11 Oct 2022 07:14 )             37.9     10-11    138  |  97<L>  |  12  ----------------------------<  154<H>  3.3<L>   |  30  |  0.79    Ca    9.1      11 Oct 2022 07:14  Phos  2.7     10-11  Mg     1.90     10-11    TPro  6.6  /  Alb  3.8  /  TBili  0.3  /  DBili  x   /  AST  20  /  ALT  19  /  AlkPhos  97  10-10    PT/INR - ( 10 Oct 2022 21:12 )   PT: 11.9 sec;   INR: 1.03 ratio         PTT - ( 10 Oct 2022 21:12 )  PTT:30.2 sec      RADIOLOGY & ADDITIONAL STUDIES: reviewed.  CT chest reviewed by Dr. Joshi: pulmonary edema less likely, more likely air trapping.     ASSESSMENT: 71 year old female MHx aneurysm, HTN, DM Type 2, HLD, h/o Covid-19  (residual symptoms, home oxygen intermittently, NC 2L/min PRN, diagnosed with chronic lung disease 2/2 covid-19) c/o coughing up bright red blood today x 2 episodes) presents with a 3-month history of hemoptysis. Etiology unclear, however unlikely pulmonary edema based on CT chest. pt is hemodynamically stable.     Recommendations:  hold aspirin and anticoagulation  bronchoscopy booked with Dr. Joshi's office for Thursday (10/13)  NPO at midnight on 10/13 for bronchoscopy procedure  recommend ENT consult  use humidified O2  please call thoracic surgery at [phone] with questions.       Pt and plan discussed with Dr. Joshi. HPI:  72yo Female with MHx aneurysm, HTN, DM Type 2, HLD, h/o Covid-19  (residual symptoms, home oxygen intermittently, NC 2L/min PRN, diagnosed with chronic lung disease 2/2 covid-19) c/o coughing up bright red blood today x 2 episodes. Pt provides a white towel with several bloody stains (2x2cm) from home and a bottle with a blood clot (0.5x2cm in size). Pt has had 3 months of bloody sputum and has 2 episodes per month. Uses home oxygen (2 L) intermittently throughout the day since Covid infection in March 2021.  Reports cough and SOB have not gotten any better or worse over the last few weeks. Reports bilateral lower extremity edema has improved over the last few weeks. States has had no palpitations, CP, fevers, Abd pain, n/v/d. Denies history of tobacco use, alcohol use, and recreational drug use. Lives at home with .    PAST MEDICAL & SURGICAL HISTORY:  Diabetes mellitus  Hypertension  COVID-19  No significant past surgical history    pertinent REVIEW OF SYSTEMS mentioned above.     Allergic/Immunologic:	 No Anaphylaxis/ Intolerance/ Recent illnesses    MEDICATIONS  (STANDING):  atorvastatin 40 milliGRAM(s) Oral at bedtime  budesonide 160 MICROgram(s)/formoterol 4.5 MICROgram(s) Inhaler 2 Puff(s) Inhalation two times a day  dextrose 5%. 1000 milliLiter(s) (50 mL/Hr) IV Continuous <Continuous>  dextrose 5%. 1000 milliLiter(s) (100 mL/Hr) IV Continuous <Continuous>  dextrose 50% Injectable 25 Gram(s) IV Push once  dextrose 50% Injectable 12.5 Gram(s) IV Push once  dextrose 50% Injectable 25 Gram(s) IV Push once  furosemide   Injectable 20 milliGRAM(s) IV Push every 12 hours  glucagon  Injectable 1 milliGRAM(s) IntraMuscular once  hydrALAZINE 25 milliGRAM(s) Oral three times a day  insulin glargine Injectable (LANTUS) 32 Unit(s) SubCutaneous every morning  insulin lispro (ADMELOG) corrective regimen sliding scale   SubCutaneous three times a day before meals  insulin lispro (ADMELOG) corrective regimen sliding scale   SubCutaneous at bedtime  insulin lispro Injectable (ADMELOG) 10 Unit(s) SubCutaneous three times a day before meals  losartan 100 milliGRAM(s) Oral daily  metoprolol tartrate 25 milliGRAM(s) Oral two times a day  potassium chloride   Powder 40 milliEquivalent(s) Oral every 4 hours    MEDICATIONS  (PRN):  acetaminophen     Tablet .. 650 milliGRAM(s) Oral every 6 hours PRN Temp greater or equal to 38C (100.4F), Mild Pain (1 - 3)  ALBUTerol    90 MICROgram(s) HFA Inhaler 1 Puff(s) Inhalation every 6 hours PRN Shortness of Breath  dextrose Oral Gel 15 Gram(s) Oral once PRN Blood Glucose LESS THAN 70 milliGRAM(s)/deciliter  melatonin 3 milliGRAM(s) Oral at bedtime PRN Insomnia  ondansetron Injectable 4 milliGRAM(s) IV Push every 8 hours PRN Nausea and/or Vomiting    Allergies  No Known Allergies  Intolerances    SOCIAL HISTORY:  Smoking Hx: denies  Etoh Hx: denies  IVDA Hx: denies    FAMILY HISTORY:  FH: lung cancer (Father)  smoker    FH: diabetes mellitus (Mother, Sibling, Grandparent)    unless noted, no significant family hx with Mother, Father, Siblings    Vital Signs Last 24 Hrs  T(C): 37.2 (11 Oct 2022 06:59), Max: 37.2 (11 Oct 2022 06:59)  T(F): 99 (11 Oct 2022 06:59), Max: 99 (11 Oct 2022 06:59)  HR: 92 (11 Oct 2022 10:58) (74 - 95)  BP: 170/92 (11 Oct 2022 10:58) (162/92 - 184/109)  BP(mean): --  RR: 18 (11 Oct 2022 10:58) (17 - 20)  SpO2: 100% (11 Oct 2022 10:58) (94% - 100%)    Parameters below as of 11 Oct 2022 10:58  Patient On (Oxygen Delivery Method): room air    General: WN/WD NAD  Neurology: Awake, nonfocal, DAILY x 4  Eyes: Scleras clear, PERRLA/ EOMI, Gross vision intact  ENT:Gross hearing intact, grossly patent pharynx, no stridor  Neck: Neck supple, trachea midline, No JVD,   Respiratory: CTA B/L, No wheezing, rales, rhonchi  CV: RRR, S1S2, no murmurs, rubs or gallops  Abdominal: Soft, NT, ND +BS,   Extremities: No edema, + peripheral pulses  Skin: No Rashes, Hematoma, Ecchymosis  Lymphatic: No Neck, axilla, groin LAD  Psych: Oriented x 3, normal affect  Incisions:   Tubes:    LABS:                        11.8   11.03 )-----------( 261      ( 11 Oct 2022 07:14 )             37.9     10-11    138  |  97<L>  |  12  ----------------------------<  154<H>  3.3<L>   |  30  |  0.79    Ca    9.1      11 Oct 2022 07:14  Phos  2.7     10-11  Mg     1.90     10-11    TPro  6.6  /  Alb  3.8  /  TBili  0.3  /  DBili  x   /  AST  20  /  ALT  19  /  AlkPhos  97  10-10    PT/INR - ( 10 Oct 2022 21:12 )   PT: 11.9 sec;   INR: 1.03 ratio         PTT - ( 10 Oct 2022 21:12 )  PTT:30.2 sec      RADIOLOGY & ADDITIONAL STUDIES: reviewed.  CT chest reviewed by Dr. Joshi: pulmonary edema less likely, more likely air trapping.     ASSESSMENT: 71 year old female MHx aneurysm, HTN, DM Type 2, HLD, h/o Covid-19  (residual symptoms, home oxygen intermittently, NC 2L/min PRN, diagnosed with chronic lung disease 2/2 covid-19) c/o coughing up bright red blood today x 2 episodes) presents with a 3-month history of hemoptysis. Etiology unclear, however unlikely pulmonary edema based on CT chest. pt is hemodynamically stable.     Recommendations:  hold aspirin and anticoagulation  bronchoscopy booked with Dr. Joshi's office for Thursday (10/13)  NPO at midnight on 10/13 for bronchoscopy procedure  recommend ENT consult for further w/u of source of bleeding.   use humidified O2  Per Dr. Joshi, CT scan reviewed, feels more like air trapping instead of Pulm Edema. Would avoid  giving diuretics   please call thoracic surgery at [phone] with questions.       Pt and plan discussed with Dr. Joshi.

## 2022-10-11 NOTE — H&P ADULT - ASSESSMENT
IMPRESSION:  Cardiomegaly and moderate pulmonary edema.  No main, right main, left main, lobar or segmental pulmonary embolism. Limited evaluation of subsegmental pulmonary arteries secondary to motion and mixing artifact.. 72yo Female with MHx aneurysm, HTN, DM Type 2, HLD, CVA, h/o Covid-19  (residual symptoms, home oxygen intermittently, NC 2L/min PRN, diagnosed with chronic lung disease 2/2 covid-19) a/w hemoptysis and acute CHF c/b pulmonary edema; Noradiological evidence of main, lobar or segmental pulmonary embolism.    Cardiomegaly and moderate pulmonary edema.  No main, right main, left main, lobar or segmental pulmonary embolism. Limited evaluation of subsegmental pulmonary arteries secondary to motion and mixing artifact.. 70yo Female with MHx aneurysm, HTN, DM Type 2, HLD, CVA, h/o Covid-19  (residual symptoms, home oxygen intermittently, NC 2L/min PRN, diagnosed with chronic lung disease 2/2 covid-19) a/w hemoptysis and acute CHF c/b pulmonary edema; Noradiological evidence of main, lobar or segmental pulmonary embolism.

## 2022-10-11 NOTE — CONSULT NOTE ADULT - PROBLEM SELECTOR RECOMMENDATION 2
She is having hemoptysis: she has collected blood in cup and brought to hospital: it does not look like pulm edema type of pink frothy sputum: she will probably need a bronchoscopy: DW ACT attending dr barahona: reviewed ct sca chst with him:  ? why hemoptysis: will check vasculitis workup

## 2022-10-11 NOTE — CONSULT NOTE ADULT - SUBJECTIVE AND OBJECTIVE BOX
10-11-22 @ 11:17    Patient is a 71y old  Female who presents with a chief complaint of Hemoptysis (11 Oct 2022 06:02)      HPI:  72yo Female with MHx aneurysm, HTN, DM Type 2, HLD, h/o Covid-19  (residual symptoms, home oxygen intermittently, NC 2L/min PRN, diagnosed with chronic lung disease 2/2 covid-19) c/o coughing up bright red blood today x 2 episodes. Pt provides a white towel with several bloody stains (2x2cm) from home and a bottle with a blood clot (0.5x2cm in size).  No prior episodes like that in the past. Reports 3 weeks of coughing with worsening SOB. Also reports associated worsening bilateral lower extremity swelling. States has had no palpitations, CP, fevers, Abd pain, n/v/d; Of note, patient was treated with Azithromycin x 5 days by PCP with no relief. Reports no recent travel, no hx of TB, non-smoker. (11 Oct 2022 04:44)     she is from [place] and never had [place] pul[place] issues before:  she never had tb: she had covid before and  denies having pe at that time:  she is not having weight loss:  She says she has been coughing up for last three months with  and was intermittently coughing up mild blood: she did not think of it much ; she had tongue surgery three weeks ago ? reason :  she has no fever and no chest pain :    ?FOLLOWING PRESENT  [ x] Hx of PE/DVT, [ x] Hx COPD, [ x] Hx of Asthma, [y ] Hx of Hospitalization, [x ]  Hx of BiPAP/CPAP use, [ x] Hx of VENU    Allergies    No Known Allergies    Intolerances        PAST MEDICAL & SURGICAL HISTORY:  Diabetes mellitus      Hypertension      COVID-19      No significant past surgical history          FAMILY HISTORY:  FH: lung cancer (Father)  smoker    FH: diabetes mellitus (Mother, Sibling, Grandparent)        Social History: [x  ] TOBACCO                  [x  ] ETOH                                 [x ] IVDA/DRUGS    REVIEW OF SYSTEMS      General:x	    Skin/Breast:x  	  Ophthalmologic:x  	  ENMT:	x    Respiratory and Thorax: hemoptysis  	  Cardiovascular:	x    Gastrointestinal:	x    Genitourinary:	x    Musculoskeletal:	x    Neurological:	x    Psychiatric:	x    Hematology/Lymphatics:	x    Endocrine:	x    Allergic/Immunologic:	x    MEDICATIONS  (STANDING):  acetaminophen   IVPB .. 1000 milliGRAM(s) IV Intermittent once  aspirin enteric coated 81 milliGRAM(s) Oral daily  atorvastatin 40 milliGRAM(s) Oral at bedtime  dextrose 5%. 1000 milliLiter(s) (50 mL/Hr) IV Continuous <Continuous>  dextrose 5%. 1000 milliLiter(s) (100 mL/Hr) IV Continuous <Continuous>  dextrose 50% Injectable 25 Gram(s) IV Push once  dextrose 50% Injectable 12.5 Gram(s) IV Push once  dextrose 50% Injectable 25 Gram(s) IV Push once  furosemide   Injectable 20 milliGRAM(s) IV Push every 12 hours  glucagon  Injectable 1 milliGRAM(s) IntraMuscular once  hydrALAZINE 25 milliGRAM(s) Oral three times a day  insulin glargine Injectable (LANTUS) 32 Unit(s) SubCutaneous every morning  insulin lispro (ADMELOG) corrective regimen sliding scale   SubCutaneous three times a day before meals  insulin lispro (ADMELOG) corrective regimen sliding scale   SubCutaneous at bedtime  insulin lispro Injectable (ADMELOG) 10 Unit(s) SubCutaneous three times a day before meals  losartan 100 milliGRAM(s) Oral daily  metoprolol tartrate 25 milliGRAM(s) Oral two times a day    MEDICATIONS  (PRN):  acetaminophen     Tablet .. 650 milliGRAM(s) Oral every 6 hours PRN Temp greater or equal to 38C (100.4F), Mild Pain (1 - 3)  dextrose Oral Gel 15 Gram(s) Oral once PRN Blood Glucose LESS THAN 70 milliGRAM(s)/deciliter  melatonin 3 milliGRAM(s) Oral at bedtime PRN Insomnia  ondansetron Injectable 4 milliGRAM(s) IV Push every 8 hours PRN Nausea and/or Vomiting       Vital Signs Last 24 Hrs  T(C): 37.2 (11 Oct 2022 06:59), Max: 37.2 (11 Oct 2022 06:59)  T(F): 99 (11 Oct 2022 06:59), Max: 99 (11 Oct 2022 06:59)  HR: 92 (11 Oct 2022 10:58) (74 - 95)  BP: 170/92 (11 Oct 2022 10:58) (162/92 - 185/98)  BP(mean): --  RR: 18 (11 Oct 2022 10:58) (17 - 20)  SpO2: 100% (11 Oct 2022 10:58) (93% - 100%)    Parameters below as of 11 Oct 2022 10:58  Patient On (Oxygen Delivery Method): room air    Orthostatic VS          I&O's Summary      Physical Exam:   GENERAL: NAD, well-groomed, well-developed  HEENT: NORMA/   Atraumatic, Normocephalic  ENMT: No tonsillar erythema, exudates, or enlargement; Moist mucous membranes, Good dentition, No lesions  NECK: Supple, No JVD, Normal thyroid  CHEST/LUNG: no wheezing: no cough  CVS: Regular rate and rhythm; No murmurs, rubs, or gallops  GI: : Soft, Nontender, Nondistended; Bowel sounds present  NERVOUS SYSTEM:  Alert & Oriented X3  EXTREMITIES:  -edema  LYMPH: No lymphadenopathy noted  SKIN: No rashes or lesions  ENDOCRINOLOGY: No Thyromegaly  PSYCH: Appropriate    Labs:  Venous<57<4>>52<<7.365>>Venous<<3><<4><<5<<529>>SARS-CoV-2: NotDetec (10 Oct 2022 19:09)                              11.8   11.03 )-----------( 261      ( 11 Oct 2022 07:14 )             37.9                         11.7   8.17  )-----------( 262      ( 10 Oct 2022 18:50 )             38.3     10-11    138  |  97<L>  |  12  ----------------------------<  154<H>  3.3<L>   |  30  |  0.79  10-10    140  |  101  |  14  ----------------------------<  184<H>  3.5   |  29  |  0.87    Ca    9.1      11 Oct 2022 07:14  Ca    9.2      10 Oct 2022 18:50  Phos  2.7     10-11  Mg     1.90     10-11    TPro  6.6  /  Alb  3.8  /  TBili  0.3  /  DBili  x   /  AST  20  /  ALT  19  /  AlkPhos  97  10-10    CAPILLARY BLOOD GLUCOSE      POCT Blood Glucose.: 150 mg/dL (11 Oct 2022 09:15)  POCT Blood Glucose.: 134 mg/dL (11 Oct 2022 01:15)  POCT Blood Glucose.: 168 mg/dL (10 Oct 2022 14:22)    LIVER FUNCTIONS - ( 10 Oct 2022 18:50 )  Alb: 3.8 g/dL / Pro: 6.6 g/dL / ALK PHOS: 97 U/L / ALT: 19 U/L / AST: 20 U/L / GGT: x           PT/INR - ( 10 Oct 2022 21:12 )   PT: 11.9 sec;   INR: 1.03 ratio         PTT - ( 10 Oct 2022 21:12 )  PTT:30.2 sec    D DImer  Serum Pro-Brain Natriuretic Peptide: 1206 pg/mL (10-10 @ 18:50)      Studies  Chest X-RAY  CT SCAN Chest   CT Abdomen  Venous Dopplers: LE:   Others    < from: CT Angio Chest PE Protocol w/ IV Cont (10.10.22 @ 21:46) >  VESSELS: Atherosclerotic changes.  RETROPERITONEUM/LYMPH NODES: No lymphadenopathy.  ABDOMINAL WALL: Small fat-containing umbilicalhernia.  BONES: Degenerative changes.    IMPRESSION:  Cardiomegaly and moderate pulmonary edema.    No main, right main, left main, lobar or segmental pulmonary embolism.   Limited evaluation of subsegmental pulmonary arteries secondary to motion   and mixing artifact..        --- End of Report ---    < end of copied text >  < from: Transthoracic Echocardiogram (10.11.22 @ 07:20) >  normal left ventricular systolic function.  Right Heart: Normalright atrium. The right ventricle is  not well visualized; grossly normal right ventricular  systolic function. Normal tricuspid valve. Minimal  tricuspid regurgitation. Normal pulmonic valve.  Pericardium/PleuraNormal pericardium with no pericardial  effusion.  ------------------------------------------------------------------------  CONCLUSIONS:  1. Mitral annular calcification, otherwise normal mitral  valve. Minimal mitral regurgitation.  2. Endocardium not well visualized; grossly normal left  ventricular systolic function.  3. The right ventricle is not well visualized; grossly  normal right ventricular systolic function.  ------------------------------------------------------------------------  Confirmed on  10/11/2022 - 08:38:01 by Merlin Wilkerson M.D.,  [facility], [facility]  ------------------------------------------------------------------------    < end of copied text >  < from: CT Abdomen and Pelvis w/ IV Cont (10.10.22 @ 21:45) >  pulmonary edema.  PLEURA: No pleural effusion.  VESSELS: Aortic and coronary artery calcifications. No main, right main,   left main, lobar or segmental pulmonary embolism. Limited evaluation of   subsegmental pulmonary arteries secondary to motion and mixing artifact.  HEART: Cardiomegaly. Trace pericardial effusion. Mitral valve annular   calcifications.  MEDIASTINUM AND YAAKOV: Prominent mediastinal lymph nodes measure less than   1.5 cm in short axis diameter.  CHEST WALL AND LOWER NECK: Within normal limits.    ABDOMEN AND PELVIS:  LIVER: Within normal limits.  BILE DUCTS: Normal caliber.  GALLBLADDER: Within normal limits.  SPLEEN: Within normal limits.  PANCREAS: Within normal limits.  ADRENALS: Within normal limits.  KIDNEYS/URETERS: Left renal cysts and additional bilateral hypodensities   too small characterize. No hydronephrosis.    BLADDER: Within normal limits.  REPRODUCTIVE ORGANS: Fibroid uterus. A 4.0 cm right adnexal cyst.    BOWEL: No bowel obstruction. Appendix is normal.  PERITONEUM: No ascites.  VESSELS: Atherosclerotic changes.  RETROPERITONEUM/LYMPH NODES: No lymphadenopathy.  ABDOMINAL WALL: Small fat-containing umbilicalhernia.  BONES: Degenerative changes.    IMPRESSION:  Cardiomegaly and moderate pulmonary edema.    No main, right main, left main, lobar or segmental pulmonary embolism.   Limited evaluation of subsegmental pulmonary arteries secondary to motion   and mixing artifact..        --- End of Report ---          LIZZ NATHAN MD; Resident Radiologist  This document has been electronically signed.  NICHOLAS RITCHIE MD; Attending Radiologist    < end of copied text >

## 2022-10-11 NOTE — CONSULT NOTE ADULT - SUBJECTIVE AND OBJECTIVE BOX
Cardiology/Vascular Medicine Inpatient Consultation Note    HISTORY OF PRESENT ILLNESS:    Patient is a 72 yo woman with history of an aneurysm, HTN, DM Type 2, HLD, h/o Covid-19  (residual symptoms, home oxygen intermittently, NC 2L/min PRN, diagnosed with chronic lung disease 2/2 covid-19) c/o coughing up bright red blood today x 2 episodes.     Pt provides a white towel with several bloody stains (2x2cm) from home and a bottle with a blood clot (0.5x2cm in size).  No prior episodes like that in the past.     From the cardiopulmonary perspective, she also reports having three weeks of progressive coughing with worsening SOB.   Also reports associated worsening bilateral lower extremity swelling. States has had no palpitations, CP, fevers, Abd pain, n/v/d; Of note, patient was treated with Azithromycin x 5 days by PCP with no relief. Reports no recent travel, no hx of TB, non-smoker. (11 Oct 2022 04:44)          Allergies  No Known Allergies    MEDICATIONS:  furosemide   Injectable 20 milliGRAM(s) IV Push every 12 hours  acetaminophen     Tablet .. 650 milliGRAM(s) Oral every 6 hours PRN  melatonin 3 milliGRAM(s) Oral at bedtime PRN  ondansetron Injectable 4 milliGRAM(s) IV Push every 8 hours PRN    PAST MEDICAL & SURGICAL HISTORY:  Diabetes mellitus  Hypertension  COVID-19    No significant past surgical history      FAMILY HISTORY:  FH: lung cancer (Father) smoker  FH: diabetes mellitus (Mother, Sibling, Grandparent)    SOCIAL HISTORY:    As above, as per chart notes    REVIEW OF SYSTEMS:  As above, as per chart notes    PHYSICAL EXAM:  T(C): 36.7 (10-10-22 @ 22:09), Max: 37.1 (10-10-22 @ 15:45)  HR: 84 (10-10-22 @ 22:09) (74 - 91)  BP: 171/100 (10-10-22 @ 22:09) (171/100 - 185/98)  RR: 18 (10-10-22 @ 22:09) (17 - 18)  SpO2: 94% (10-10-22 @ 22:09) (93% - 97%)    Appearance: NAd  HEENT:   No JVD  Cardiovascular: Normal S1 S2  Respiratory: Decreased breath sounds bilaterally  Gastrointestinal:  Soft, Non-tender, + BS	  Neurologic: Non-focal  Extremities:     LABS:	 	    CBC Full  -  ( 10 Oct 2022 18:50 )  WBC Count : 8.17 K/uL  Hemoglobin : 11.7 g/dL  Hematocrit : 38.3 %  Platelet Count - Automated : 262 K/uL  Mean Cell Volume : 87.6 fL  Mean Cell Hemoglobin : 26.8 pg  Mean Cell Hemoglobin Concentration : 30.5 gm/dL  Auto Neutrophil # : 5.51 K/uL  Auto Lymphocyte # : 1.70 K/uL  Auto Monocyte # : 0.69 K/uL  Auto Eosinophil # : 0.20 K/uL  Auto Basophil # : 0.03 K/uL  Auto Neutrophil % : 67.5 %  Auto Lymphocyte % : 20.8 %  Auto Monocyte % : 8.4 %  Auto Eosinophil % : 2.4 %  Auto Basophil % : 0.4 %    10-10    140  |  101  |  14  ----------------------------<  184<H>  3.5   |  29  |  0.87    Ca    9.2      10 Oct 2022 18:50    TPro  6.6  /  Alb  3.8  /  TBili  0.3  /  DBili  x   /  AST  20  /  ALT  19  /  AlkPhos  97  10-10      proBNP: Serum Pro-Brain Natriuretic Peptide: 1206 pg/mL (10-10-22 @ 18:50)    < from: CT Angio Chest PE Protocol w/ IV Cont (10.10.22 @ 21:46) >  ACC: [id number] EXAM:  CT ABDOMEN AND PELVIS IC                        ACC: [id number] [facility]                          PROCEDURE DATE:  10/10/2022          INTERPRETATION:  CLINICAL INFORMATION: Cough and worsening dyspnea. Rule   out PE    COMPARISON: None available    CONTRAST/COMPLICATIONS:  IV Contrast: Omnipaque 350  90 cc administered   10 cc discarded  Oral Contrast: NONE  Complications: None reported at time of study completion    PROCEDURE:  CT Angiography of the Chest was performed followed by portal venous phase   imaging of the Abdomen and Pelvis.  Sagittal and coronal reformats were performed as well as 3D (MIP)   reconstructions.    FINDINGS:  CHEST:  LUNGS AND LARGE AIRWAYS: Patent central airways. Diffuse bilateral   groundglass opacities and interlobular septal thickening consistent with   pulmonary edema.  PLEURA: No pleural effusion.  VESSELS: Aortic and coronary artery calcifications. No main, right main,   left main, lobar or segmental pulmonary embolism. Limited evaluation of   subsegmental pulmonary arteries secondary to motion and mixing artifact.  HEART: Cardiomegaly. Trace pericardial effusion. Mitral valve annular   calcifications.  MEDIASTINUM AND YAAKOV: Prominent mediastinal lymph nodes measure less than   1.5 cm in short axis diameter.  CHEST WALL AND LOWER NECK: Within normal limits.    ABDOMEN AND PELVIS:  LIVER: Within normal limits.  BILE DUCTS: Normal caliber.  GALLBLADDER: Within normal limits.  SPLEEN: Within normal limits.  PANCREAS: Within normal limits.  ADRENALS: Within normal limits.  KIDNEYS/URETERS: Left renal cysts and additional bilateral hypodensities   too small characterize. No hydronephrosis.    BLADDER: Within normal limits.  REPRODUCTIVE ORGANS: Fibroid uterus. A 4.0 cm right adnexal cyst.    BOWEL: No bowel obstruction. Appendix is normal.  PERITONEUM: No ascites.  VESSELS: Atherosclerotic changes.  RETROPERITONEUM/LYMPH NODES: No lymphadenopathy.  ABDOMINAL WALL: Small fat-containing umbilicalhernia.  BONES: Degenerative changes.    IMPRESSION:  Cardiomegaly and moderate pulmonary edema.    No main, right main, left main, lobar or segmental pulmonary embolism.   Limited evaluation of subsegmental pulmonary arteries secondary to motion   and mixing artifact..        --- End of Report ---          LIZZ NATHAN MD; Resident Radiologist  This document has been electronically signed.  NICHOLAS RITCHIE MD; Attending Radiologist  This document has been electronically signed. Oct 10 2022 10:35PM    < end of copied text >  	     Cardiology/Vascular Medicine Inpatient Consultation Note    HISTORY OF PRESENT ILLNESS:    Patient is a 72 yo woman with history of an aneurysm, HTN, DM Type 2, HLD, h/o Covid-19  (residual symptoms, home oxygen intermittently, NC 2L/min PRN, diagnosed with chronic lung disease 2/2 covid-19) c/o coughing up bright red blood today x 2 episodes.     Patient provides a white towel with several bloody stains (2x2cm) from home and a bottle with a blood clot (0.5x2cm in size).  No prior episodes like that in the past.     From the cardiopulmonary perspective, she also reports having three weeks of progressive coughing with worsening SOB.   Also reports associated worsening bilateral lower extremity swelling. States has had no palpitations, CP, fevers, Abd pain, n/v/d; Of note, patient was treated with Azithromycin x 5 days by PCP with no relief. Reports no recent travel, no hx of TB, non-smoker. (11 Oct 2022 04:44)    CTA chest:  --No central PE  --Cardiomegaly and moderate pulmonary edema.    When evaluated this AM, patient appeared clinically and hemodynamically stable.  On exam, coarse breath sounds noted, +LE edema bilaterally.    Impression:  --Unclear etiology for current symptoms, but could this be acute on chronic pulmonary process since COVID and/or superimposed ADHF/HFpEF (preliminary echo with normal LVEF, BNP   --Hemoptysis    Recommendations:  --Monitor on cardiac telemetry  --Will arrange for echocardiogram to assess cardiac structure/function  --Continue with IV Lasix 20 mg BID. Monitor I/Os, daily standing weights, fluid restriction to 1.5 L/24 hrs  --Pulmonary evaluation    Allergies  No Known Allergies    MEDICATIONS:  furosemide   Injectable 20 milliGRAM(s) IV Push every 12 hours  acetaminophen     Tablet .. 650 milliGRAM(s) Oral every 6 hours PRN  melatonin 3 milliGRAM(s) Oral at bedtime PRN  ondansetron Injectable 4 milliGRAM(s) IV Push every 8 hours PRN    PAST MEDICAL & SURGICAL HISTORY:  Diabetes mellitus  Hypertension  COVID-19    No significant past surgical history      FAMILY HISTORY:  FH: lung cancer (Father) smoker  FH: diabetes mellitus (Mother, Sibling, Grandparent)    SOCIAL HISTORY:    As above, as per chart notes    REVIEW OF SYSTEMS:  As above, as per chart notes    PHYSICAL EXAM:  T(C): 36.7 (10-10-22 @ 22:09), Max: 37.1 (10-10-22 @ 15:45)  HR: 84 (10-10-22 @ 22:09) (74 - 91)  BP: 171/100 (10-10-22 @ 22:09) (171/100 - 185/98)  RR: 18 (10-10-22 @ 22:09) (17 - 18)  SpO2: 94% (10-10-22 @ 22:09) (93% - 97%)    Appearance: NAd  HEENT:   No JVD  Cardiovascular: Normal S1 S2  Respiratory: Decreased breath sounds bilaterally  Gastrointestinal:  Soft, Non-tender, + BS	  Neurologic: Non-focal  Extremities:     LABS:	 	    CBC Full  -  ( 10 Oct 2022 18:50 )  WBC Count : 8.17 K/uL  Hemoglobin : 11.7 g/dL  Hematocrit : 38.3 %  Platelet Count - Automated : 262 K/uL  Mean Cell Volume : 87.6 fL  Mean Cell Hemoglobin : 26.8 pg  Mean Cell Hemoglobin Concentration : 30.5 gm/dL  Auto Neutrophil # : 5.51 K/uL  Auto Lymphocyte # : 1.70 K/uL  Auto Monocyte # : 0.69 K/uL  Auto Eosinophil # : 0.20 K/uL  Auto Basophil # : 0.03 K/uL  Auto Neutrophil % : 67.5 %  Auto Lymphocyte % : 20.8 %  Auto Monocyte % : 8.4 %  Auto Eosinophil % : 2.4 %  Auto Basophil % : 0.4 %    10-10    140  |  101  |  14  ----------------------------<  184<H>  3.5   |  29  |  0.87    Ca    9.2      10 Oct 2022 18:50    TPro  6.6  /  Alb  3.8  /  TBili  0.3  /  DBili  x   /  AST  20  /  ALT  19  /  AlkPhos  97  10-10      proBNP: Serum Pro-Brain Natriuretic Peptide: 1206 pg/mL (10-10-22 @ 18:50)    < from: CT Angio Chest PE Protocol w/ IV Cont (10.10.22 @ 21:46) >  ACC: [id number] EXAM:  CT ABDOMEN AND PELVIS IC                        ACC: [id number] EXAM:  CT ANGIO CHEST PULM [facility]                          PROCEDURE DATE:  10/10/2022          INTERPRETATION:  CLINICAL INFORMATION: Cough and worsening dyspnea. Rule   out PE    COMPARISON: None available    CONTRAST/COMPLICATIONS:  IV Contrast: Omnipaque 350  90 cc administered   10 cc discarded  Oral Contrast: NONE  Complications: None reported at time of study completion    PROCEDURE:  CT Angiography of the Chest was performed followed by portal venous phase   imaging of the Abdomen and Pelvis.  Sagittal and coronal reformats were performed as well as 3D (MIP)   reconstructions.    FINDINGS:  CHEST:  LUNGS AND LARGE AIRWAYS: Patent central airways. Diffuse bilateral   groundglass opacities and interlobular septal thickening consistent with   pulmonary edema.  PLEURA: No pleural effusion.  VESSELS: Aortic and coronary artery calcifications. No main, right main,   left main, lobar or segmental pulmonary embolism. Limited evaluation of   subsegmental pulmonary arteries secondary to motion and mixing artifact.  HEART: Cardiomegaly. Trace pericardial effusion. Mitral valve annular   calcifications.  MEDIASTINUM AND YAAKOV: Prominent mediastinal lymph nodes measure less than   1.5 cm in short axis diameter.  CHEST WALL AND LOWER NECK: Within normal limits.    ABDOMEN AND PELVIS:  LIVER: Within normal limits.  BILE DUCTS: Normal caliber.  GALLBLADDER: Within normal limits.  SPLEEN: Within normal limits.  PANCREAS: Within normal limits.  ADRENALS: Within normal limits.  KIDNEYS/URETERS: Left renal cysts and additional bilateral hypodensities   too small characterize. No hydronephrosis.    BLADDER: Within normal limits.  REPRODUCTIVE ORGANS: Fibroid uterus. A 4.0 cm right adnexal cyst.    BOWEL: No bowel obstruction. Appendix is normal.  PERITONEUM: No ascites.  VESSELS: Atherosclerotic changes.  RETROPERITONEUM/LYMPH NODES: No lymphadenopathy.  ABDOMINAL WALL: Small fat-containing umbilicalhernia.  BONES: Degenerative changes.    IMPRESSION:  Cardiomegaly and moderate pulmonary edema.    No main, right main, left main, lobar or segmental pulmonary embolism.   Limited evaluation of subsegmental pulmonary arteries secondary to motion   and mixing artifact..        --- End of Report ---          LIZZ NATHAN MD; Resident Radiologist  This document has been electronically signed.  NICHOLAS RITCHIE MD; Attending Radiologist  This document has been electronically signed. Oct 10 2022 10:35PM    < end of copied text >

## 2022-10-11 NOTE — CONSULT NOTE ADULT - ASSESSMENT
72yo Female with MHx aneurysm, HTN, DM Type 2, HLD, CVA, h/o Covid-19  (residual symptoms, home oxygen intermittently, NC 2L/min PRN, diagnosed with chronic lung disease 2/2 covid-19) a/w hemoptysis and acute CHF c/b pulmonary edema; Noradiological evidence of main, lobar or segmental pulmonary embolism.

## 2022-10-12 ENCOUNTER — TRANSCRIPTION ENCOUNTER (OUTPATIENT)
Age: 72
End: 2022-10-12

## 2022-10-12 PROBLEM — Z00.00 ENCOUNTER FOR PREVENTIVE HEALTH EXAMINATION: Status: ACTIVE | Noted: 2022-10-12

## 2022-10-12 LAB
ALBUMIN SERPL ELPH-MCNC: 4 G/DL — SIGNIFICANT CHANGE UP (ref 3.3–5)
ALP SERPL-CCNC: 105 U/L — SIGNIFICANT CHANGE UP (ref 40–120)
ALT FLD-CCNC: 19 U/L — SIGNIFICANT CHANGE UP (ref 4–33)
ANION GAP SERPL CALC-SCNC: 10 MMOL/L — SIGNIFICANT CHANGE UP (ref 7–14)
APTT BLD: 31.5 SEC — SIGNIFICANT CHANGE UP (ref 27–36.3)
AST SERPL-CCNC: 24 U/L — SIGNIFICANT CHANGE UP (ref 4–32)
BILIRUB SERPL-MCNC: 0.5 MG/DL — SIGNIFICANT CHANGE UP (ref 0.2–1.2)
BLD GP AB SCN SERPL QL: NEGATIVE — SIGNIFICANT CHANGE UP
BUN SERPL-MCNC: 14 MG/DL — SIGNIFICANT CHANGE UP (ref 7–23)
CALCIUM SERPL-MCNC: 9.1 MG/DL — SIGNIFICANT CHANGE UP (ref 8.4–10.5)
CHLORIDE SERPL-SCNC: 98 MMOL/L — SIGNIFICANT CHANGE UP (ref 98–107)
CO2 SERPL-SCNC: 32 MMOL/L — HIGH (ref 22–31)
CREAT SERPL-MCNC: 0.92 MG/DL — SIGNIFICANT CHANGE UP (ref 0.5–1.3)
EGFR: 67 ML/MIN/1.73M2 — SIGNIFICANT CHANGE UP
GLUCOSE BLDC GLUCOMTR-MCNC: 120 MG/DL — HIGH (ref 70–99)
GLUCOSE BLDC GLUCOMTR-MCNC: 124 MG/DL — HIGH (ref 70–99)
GLUCOSE BLDC GLUCOMTR-MCNC: 130 MG/DL — HIGH (ref 70–99)
GLUCOSE BLDC GLUCOMTR-MCNC: 190 MG/DL — HIGH (ref 70–99)
GLUCOSE BLDC GLUCOMTR-MCNC: 192 MG/DL — HIGH (ref 70–99)
GLUCOSE BLDC GLUCOMTR-MCNC: 192 MG/DL — HIGH (ref 70–99)
GLUCOSE SERPL-MCNC: 111 MG/DL — HIGH (ref 70–99)
HCT VFR BLD CALC: 42.6 % — SIGNIFICANT CHANGE UP (ref 34.5–45)
HCV AB S/CO SERPL IA: 0.15 S/CO — SIGNIFICANT CHANGE UP (ref 0–0.99)
HCV AB SERPL-IMP: SIGNIFICANT CHANGE UP
HGB BLD-MCNC: 12.9 G/DL — SIGNIFICANT CHANGE UP (ref 11.5–15.5)
INR BLD: 1.06 RATIO — SIGNIFICANT CHANGE UP (ref 0.88–1.16)
MAGNESIUM SERPL-MCNC: 1.9 MG/DL — SIGNIFICANT CHANGE UP (ref 1.6–2.6)
MCHC RBC-ENTMCNC: 27.1 PG — SIGNIFICANT CHANGE UP (ref 27–34)
MCHC RBC-ENTMCNC: 30.3 GM/DL — LOW (ref 32–36)
MCV RBC AUTO: 89.5 FL — SIGNIFICANT CHANGE UP (ref 80–100)
NRBC # BLD: 0 /100 WBCS — SIGNIFICANT CHANGE UP (ref 0–0)
NRBC # FLD: 0 K/UL — SIGNIFICANT CHANGE UP (ref 0–0)
PHOSPHATE SERPL-MCNC: 4.2 MG/DL — SIGNIFICANT CHANGE UP (ref 2.5–4.5)
PLATELET # BLD AUTO: 320 K/UL — SIGNIFICANT CHANGE UP (ref 150–400)
POTASSIUM SERPL-MCNC: 3.9 MMOL/L — SIGNIFICANT CHANGE UP (ref 3.5–5.3)
POTASSIUM SERPL-SCNC: 3.9 MMOL/L — SIGNIFICANT CHANGE UP (ref 3.5–5.3)
PROT SERPL-MCNC: 7.3 G/DL — SIGNIFICANT CHANGE UP (ref 6–8.3)
PROTHROM AB SERPL-ACNC: 12.3 SEC — SIGNIFICANT CHANGE UP (ref 10.5–13.4)
RBC # BLD: 4.76 M/UL — SIGNIFICANT CHANGE UP (ref 3.8–5.2)
RBC # FLD: 14.5 % — SIGNIFICANT CHANGE UP (ref 10.3–14.5)
RH IG SCN BLD-IMP: POSITIVE — SIGNIFICANT CHANGE UP
SARS-COV-2 RNA SPEC QL NAA+PROBE: SIGNIFICANT CHANGE UP
SODIUM SERPL-SCNC: 140 MMOL/L — SIGNIFICANT CHANGE UP (ref 135–145)
WBC # BLD: 12.2 K/UL — HIGH (ref 3.8–10.5)
WBC # FLD AUTO: 12.2 K/UL — HIGH (ref 3.8–10.5)

## 2022-10-12 PROCEDURE — 99223 1ST HOSP IP/OBS HIGH 75: CPT | Mod: 57

## 2022-10-12 PROCEDURE — 99233 SBSQ HOSP IP/OBS HIGH 50: CPT

## 2022-10-12 RX ORDER — INFLUENZA VIRUS VACCINE 15; 15; 15; 15 UG/.5ML; UG/.5ML; UG/.5ML; UG/.5ML
0.7 SUSPENSION INTRAMUSCULAR ONCE
Refills: 0 | Status: DISCONTINUED | OUTPATIENT
Start: 2022-10-12 | End: 2022-10-18

## 2022-10-12 RX ADMIN — INSULIN GLARGINE 32 UNIT(S): 100 INJECTION, SOLUTION SUBCUTANEOUS at 09:59

## 2022-10-12 RX ADMIN — Medication 10 UNIT(S): at 12:13

## 2022-10-12 RX ADMIN — Medication 650 MILLIGRAM(S): at 11:00

## 2022-10-12 RX ADMIN — Medication 650 MILLIGRAM(S): at 02:55

## 2022-10-12 RX ADMIN — BUDESONIDE AND FORMOTEROL FUMARATE DIHYDRATE 2 PUFF(S): 160; 4.5 AEROSOL RESPIRATORY (INHALATION) at 21:38

## 2022-10-12 RX ADMIN — ATORVASTATIN CALCIUM 40 MILLIGRAM(S): 80 TABLET, FILM COATED ORAL at 21:35

## 2022-10-12 RX ADMIN — Medication 650 MILLIGRAM(S): at 10:00

## 2022-10-12 RX ADMIN — Medication 25 MILLIGRAM(S): at 18:02

## 2022-10-12 RX ADMIN — Medication 650 MILLIGRAM(S): at 03:29

## 2022-10-12 RX ADMIN — Medication 10 UNIT(S): at 10:01

## 2022-10-12 RX ADMIN — Medication 10 UNIT(S): at 17:54

## 2022-10-12 RX ADMIN — Medication 25 MILLIGRAM(S): at 13:20

## 2022-10-12 RX ADMIN — LOSARTAN POTASSIUM 100 MILLIGRAM(S): 100 TABLET, FILM COATED ORAL at 06:10

## 2022-10-12 RX ADMIN — Medication 25 MILLIGRAM(S): at 21:35

## 2022-10-12 RX ADMIN — Medication 25 MILLIGRAM(S): at 06:09

## 2022-10-12 RX ADMIN — Medication 2: at 17:54

## 2022-10-12 RX ADMIN — Medication 25 MILLIGRAM(S): at 06:10

## 2022-10-12 NOTE — PROGRESS NOTE ADULT - SUBJECTIVE AND OBJECTIVE BOX
Date of Service: 10-12-22 @ 12:50    Patient is a 71y old  Female who presents with a chief complaint of Hemoptysis (12 Oct 2022 10:07)      Any change in ROS: no hemoptysis no cough :      MEDICATIONS  (STANDING):  atorvastatin 40 milliGRAM(s) Oral at bedtime  budesonide 160 MICROgram(s)/formoterol 4.5 MICROgram(s) Inhaler 2 Puff(s) Inhalation two times a day  dextrose 5%. 1000 milliLiter(s) (50 mL/Hr) IV Continuous <Continuous>  dextrose 5%. 1000 milliLiter(s) (100 mL/Hr) IV Continuous <Continuous>  dextrose 50% Injectable 25 Gram(s) IV Push once  dextrose 50% Injectable 12.5 Gram(s) IV Push once  dextrose 50% Injectable 25 Gram(s) IV Push once  furosemide   Injectable 20 milliGRAM(s) IV Push every 12 hours  glucagon  Injectable 1 milliGRAM(s) IntraMuscular once  hydrALAZINE 25 milliGRAM(s) Oral three times a day  influenza  Vaccine (HIGH DOSE) 0.7 milliLiter(s) IntraMuscular once  insulin glargine Injectable (LANTUS) 32 Unit(s) SubCutaneous every morning  insulin lispro (ADMELOG) corrective regimen sliding scale   SubCutaneous three times a day before meals  insulin lispro (ADMELOG) corrective regimen sliding scale   SubCutaneous at bedtime  insulin lispro Injectable (ADMELOG) 10 Unit(s) SubCutaneous three times a day before meals  losartan 100 milliGRAM(s) Oral daily  metoprolol tartrate 25 milliGRAM(s) Oral two times a day    MEDICATIONS  (PRN):  acetaminophen     Tablet .. 650 milliGRAM(s) Oral every 6 hours PRN Temp greater or equal to 38C (100.4F), Mild Pain (1 - 3)  ALBUTerol    90 MICROgram(s) HFA Inhaler 1 Puff(s) Inhalation every 6 hours PRN Shortness of Breath  dextrose Oral Gel 15 Gram(s) Oral once PRN Blood Glucose LESS THAN 70 milliGRAM(s)/deciliter  melatonin 3 milliGRAM(s) Oral at bedtime PRN Insomnia  ondansetron Injectable 4 milliGRAM(s) IV Push every 8 hours PRN Nausea and/or Vomiting    Vital Signs Last 24 Hrs  T(C): 36.8 (12 Oct 2022 12:43), Max: 36.8 (11 Oct 2022 19:04)  T(F): 98.3 (12 Oct 2022 12:43), Max: 98.3 (12 Oct 2022 12:43)  HR: 66 (12 Oct 2022 12:43) (66 - 88)  BP: 137/87 (12 Oct 2022 12:43) (121/84 - 174/96)  BP(mean): --  RR: 18 (12 Oct 2022 12:43) (17 - 18)  SpO2: 95% (12 Oct 2022 12:43) (95% - 100%)    Parameters below as of 12 Oct 2022 12:43  Patient On (Oxygen Delivery Method): nasal cannula  O2 Flow (L/min): 2      I&O's Summary        Physical Exam:   GENERAL: NAD, well-groomed, well-developed  HEENT: NORMA/   Atraumatic, Normocephalic  ENMT: No tonsillar erythema, exudates, or enlargement; Moist mucous membranes, Good dentition, No lesions  NECK: Supple, No JVD, Normal thyroid  CHEST/LUNG: Clear to auscultaion  CVS: Regular rate and rhythm; No murmurs, rubs, or gallops  GI: : Soft, Nontender, Nondistended; Bowel sounds present  NERVOUS SYSTEM:  Alert & Oriented X3  EXTREMITIES:  2+ Peripheral Pulses, No clubbing, cyanosis, or edema  LYMPH: No lymphadenopathy noted  SKIN: No rashes or lesions  ENDOCRINOLOGY: No Thyromegaly  PSYCH: Appropriate    Labs:  32                            12.9   12.20 )-----------( 320      ( 12 Oct 2022 05:45 )             42.6                         11.8   11.03 )-----------( 261      ( 11 Oct 2022 07:14 )             37.9                         11.7   8.17  )-----------( 262      ( 10 Oct 2022 18:50 )             38.3     10-12    140  |  98  |  14  ----------------------------<  111<H>  3.9   |  32<H>  |  0.92  10-11    138  |  97<L>  |  12  ----------------------------<  154<H>  3.3<L>   |  30  |  0.79  10-10    140  |  101  |  14  ----------------------------<  184<H>  3.5   |  29  |  0.87    Ca    9.1      12 Oct 2022 05:45  Ca    9.1      11 Oct 2022 07:14  Ca    9.2      10 Oct 2022 18:50  Phos  4.2     10-12  Phos  2.7     10-11  Mg     1.90     10-12  Mg     1.90     10-11    TPro  7.3  /  Alb  4.0  /  TBili  0.5  /  DBili  x   /  AST  24  /  ALT  19  /  AlkPhos  105  10-12  TPro  6.6  /  Alb  3.8  /  TBili  0.3  /  DBili  x   /  AST  20  /  ALT  19  /  AlkPhos  97  10-10    CAPILLARY BLOOD GLUCOSE      POCT Blood Glucose.: 120 mg/dL (12 Oct 2022 11:47)  POCT Blood Glucose.: 192 mg/dL (12 Oct 2022 09:56)  POCT Blood Glucose.: 130 mg/dL (12 Oct 2022 07:39)  POCT Blood Glucose.: 124 mg/dL (12 Oct 2022 06:37)  POCT Blood Glucose.: 114 mg/dL (11 Oct 2022 22:04)  POCT Blood Glucose.: 100 mg/dL (11 Oct 2022 17:36)      LIVER FUNCTIONS - ( 12 Oct 2022 05:45 )  Alb: 4.0 g/dL / Pro: 7.3 g/dL / ALK PHOS: 105 U/L / ALT: 19 U/L / AST: 24 U/L / GGT: x           PT/INR - ( 12 Oct 2022 05:45 )   PT: 12.3 sec;   INR: 1.06 ratio         PTT - ( 12 Oct 2022 05:45 )  PTT:31.5 sec    Serum Pro-Brain Natriuretic Peptide: 1206 pg/mL (10-10 @ 18:50)    rad< from: CT Angio Chest PE Protocol w/ IV Cont (10.10.22 @ 21:46) >  too small characterize. No hydronephrosis.    BLADDER: Within normal limits.  REPRODUCTIVE ORGANS: Fibroid uterus. A 4.0 cm right adnexal cyst.    BOWEL: No bowel obstruction. Appendix is normal.  PERITONEUM: No ascites.  VESSELS: Atherosclerotic changes.  RETROPERITONEUM/LYMPH NODES: No lymphadenopathy.  ABDOMINAL WALL: Small fat-containing umbilicalhernia.  BONES: Degenerative changes.    IMPRESSION:  Cardiomegaly and moderate pulmonary edema.    No main, right main, left main, lobar or segmental pulmonary embolism.   Limited evaluation of subsegmental pulmonary arteries secondary to motion   and mixing artifact..        --- End of Report ---          LIZZ NATHAN MD; Resident Radiologist  This document has been electronically signed.    < end of copied text >      RECENT CULTURES:        RESPIRATORY CULTURES:          Studies  Chest X-RAY  CT SCAN Chest   Venous Dopplers: LE:   CT Abdomen  Others        ra< from: CT Angio Chest PE Protocol w/ IV Cont (10.10.22 @ 21:46) >  SPLEEN: Within normal limits.  PANCREAS: Within normal limits.  ADRENALS: Within normal limits.  KIDNEYS/URETERS: Left renal cysts and additional bilateral hypodensities   too small characterize. No hydronephrosis.    BLADDER: Within normal limits.  REPRODUCTIVE ORGANS: Fibroid uterus. A 4.0 cm right adnexal cyst.    BOWEL: No bowel obstruction. Appendix is normal.  PERITONEUM: No ascites.  VESSELS: Atherosclerotic changes.  RETROPERITONEUM/LYMPH NODES: No lymphadenopathy.  ABDOMINAL WALL: Small fat-containing umbilicalhernia.  BONES: Degenerative changes.    IMPRESSION:  Cardiomegaly and moderate pulmonary edema.    No main, right main, left main, lobar or segmental pulmonary embolism.   Limited evaluation of subsegmental pulmonary arteries secondary to motion   and mixing artifact..        --- End of Report ---          LIZZ NATHAN MD; Resident Radiologist  This document has been electronically signed.  NICHOLAS RITCHIE MD; Attending Radiologist  This document has been electronically signed. Oct 10 2022 10:35PM    < end of copied text >      cho< from: Transthoracic Echocardiogram (10.11.22 @ 07:20) >  Aortic Root: Normal aortic root.  Aortic Valve: Aortic valve leaflet morphology not well  visualized.  Left Atrium: Normal left atrium.  Left Ventricle: Endocardium not well visualized; grossly  normal left ventricular systolic function.  Right Heart: Normalright atrium. The right ventricle is  not well visualized; grossly normal right ventricular  systolic function. Normal tricuspid valve. Minimal  tricuspid regurgitation. Normal pulmonic valve.  Pericardium/PleuraNormal pericardium with no pericardial  effusion.  ------------------------------------------------------------------------  CONCLUSIONS:  1. Mitral annular calcification, otherwise normal mitral  valve. Minimal mitral regurgitation.  2. Endocardium not well visualized; grossly normal left  ventricular systolic function.  3. The right ventricle is not well visualized; grossly  normal right ventricular systolic function.  ------------------------------------------------------------------------  Confirmed on  10/11/2022 - 08:38:01 by Merlin Wilkerson M.D.,  [facility], [facility]  ---------------------------------------------------------------    < end of copied text >

## 2022-10-12 NOTE — PROGRESS NOTE ADULT - ASSESSMENT
70yo Female with MHx aneurysm, HTN, DM Type 2, HLD, CVA, h/o Covid-19  (residual symptoms, home oxygen intermittently, NC 2L/min PRN, diagnosed with chronic lung disease 2/2 covid-19) a/w hemoptysis and acute CHF c/b pulmonary edema; Noradiological evidence of main, lobar or segmental pulmonary embolism.

## 2022-10-12 NOTE — PROGRESS NOTE ADULT - ASSESSMENT
Patient is a 72 yo woman with history of an aneurysm, HTN, DM Type 2, HLD, h/o Covid-19  (residual symptoms, home oxygen intermittently, NC 2L/min PRN, diagnosed with chronic lung disease 2/2 covid-19) c/o coughing up bright red blood today x 2 episodes.     Patient provides a white towel with several bloody stains (2x2cm) from home and a bottle with a blood clot (0.5x2cm in size).  No prior episodes like that in the past.     From the cardiopulmonary perspective, she also reports having three weeks of progressive coughing with worsening SOB.   Also reports associated worsening bilateral lower extremity swelling. States has had no palpitations, CP, fevers, Abd pain, n/v/d; Of note, patient was treated with Azithromycin x 5 days by PCP with no relief. Reports no recent travel, no hx of TB, non-smoker. (11 Oct 2022 04:44)    CTA chest:  --No central PE  --Cardiomegaly and moderate pulmonary edema.    When evaluated this AM, patient appeared clinically and hemodynamically stable.  On exam, coarse breath sounds noted, +LE edema bilaterally.    Impression:  --Unclear etiology for current symptoms  --Echocardiogram reviewed -- normal biventricular systolic function  --Thoracic/Pulmonary team suspects component of air trapping -- recommending bronchoscopy on 10/13 for further evaluation.  --ENT evaluation recommended for further evaluation of hemoptysis.  --Stop diuretics  --No further cardiac testing needed at this time.  --If stable, plan on discharge with outpatient evaluation. Patient is a 70 yo woman with history of an aneurysm, HTN, DM Type 2, HLD, h/o Covid-19  (residual symptoms, home oxygen intermittently, NC 2L/min PRN, diagnosed with chronic lung disease 2/2 covid-19) c/o coughing up bright red blood today x 2 episodes.     Patient provides a white towel with several bloody stains (2x2cm) from home and a bottle with a blood clot (0.5x2cm in size).  No prior episodes like that in the past.     From the cardiopulmonary perspective, she also reports having three weeks of progressive coughing with worsening SOB.   Also reports associated worsening bilateral lower extremity swelling. States has had no palpitations, CP, fevers, Abd pain, n/v/d; Of note, patient was treated with Azithromycin x 5 days by PCP with no relief. Reports no recent travel, no hx of TB, non-smoker. (11 Oct 2022 04:44)    CTA chest:  --No central PE  --Cardiomegaly and moderate pulmonary edema.    When evaluated this AM, patient appeared clinically and hemodynamically stable.  On exam, coarse breath sounds noted, +LE edema bilaterally.    Impression:  --Unclear etiology for current symptoms  --Echocardiogram reviewed -- normal biventricular systolic function  --Thoracic/Pulmonary team suspects component of air trapping -- recommending bronchoscopy on 10/13 for further evaluation.  --ENT evaluation recommended for further evaluation of hemoptysis.  --Stop diuretics  --No further cardiac testing needed at this time.  --From cardiac perspective, patient may proceed with bronchoscopy without the need for further cardiac testing or risk stratification.

## 2022-10-12 NOTE — CONSULT NOTE ADULT - ASSESSMENT
70yo Female with MHx aneurysm, HTN, DM Type 2, HLD, h/o Covid-19  (residual symptoms, home oxygen intermittently, NC 2L/min PRN, diagnosed with chronic lung disease 2/2 covid-19) c/o coughing up bright red blood today x 2 episodes. Pt provides a white towel with several bloody stains (2x2cm) from home and a bottle with a blood clot (0.5x2cm in size).  No prior episodes like that in the past. Reports 3 weeks of coughing with worsening SOB. Also reports associated worsening bilateral lower extremity swelling. States has had no palpitations, CP, fevers, Abd pain, n/v/d; Of note, patient was treated with Azithromycin x 5 days by PCP with no relief. Reports no recent travel, no hx of TB, non-smoker. I feel fine today .    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   ·  Plan: -Cardiology and pulmonary  helping.   -Started Lasix 20mg IVP BID x4 doses (received 40mg in ED will excessive urination)   < from: Transthoracic Echocardiogram (10.11.22 @ 07:20) >  CONCLUSIONS:  1. Mitral annular calcification, otherwise normal mitral  valve. Minimal mitral regurgitation.  2. Endocardium not well visualized; grossly normal left  ventricular systolic function.  3. The right ventricle is not well visualized; grossly  normal right ventricular systolic function.    < end of copied text >       Problem/Plan - 2:  ·  Problem: Hemoptysis.   ·  Plan: Awaiting Bronchoscopy by Thoracic .   CTA chest: No main, right main, left main, lobar or segmental pulmonary embolism. Limited evaluation of subsegmental pulmonary arteries secondary to motion and mixing artifact. Patent central airways. Diffuse bilateral   groundglass opacities and interlobular septal thickening consistent with pulmonary edema. No pleural effusion.  -Hold Heparin for DVT  -Pulmonology following.   -Supp O2 NC  -VS q4h  -Elev HOB.     Problem/Plan - 3:  ·  Problem: Type 2 diabetes mellitus with hyperglycemia, with long-term current use of insulin.   ·  Plan: -Sugars fine.  Conservative insulin dosage: Lantus 40u -->32u in AM, Novolog 25u TID --> 10u TID while inpt  -monitor closely FS (pt states that skips insulin sometimes due to low BG)   -moderate IASS  -a1c  -DM diet.     Problem/Plan - 4:  ·  Problem: Stroke.   ·  Plan: Chronic; On regular consistency diet with thin fluids;  -fall, aspiration precautions.     Problem/Plan - 5:  ·  Problem: DVT prophylaxis.   ·  Plan: No Heparin due to hemoptysis   SCDs.

## 2022-10-12 NOTE — PROGRESS NOTE ADULT - SUBJECTIVE AND OBJECTIVE BOX
Cardiology/Vascular Medicine Inpatient Progress Note    Echocardiogram reviewed -- normal biventricular systolic function  Appreciate Pulmonary and Thoracic Sx recommendations  Plan on outpatient bronchoscopy on 10/13/22 with Dr. Joshi. Team suspects air trapping.  ENT evaluation recommended for further evaluation of hemoptysis.    Vital Signs Last 24 Hrs  T(C): 36.7 (12 Oct 2022 06:05), Max: 36.8 (11 Oct 2022 19:04)  T(F): 98 (12 Oct 2022 06:05), Max: 98.2 (11 Oct 2022 19:04)  HR: 80 (12 Oct 2022 06:05) (80 - 92)  BP: 129/84 (12 Oct 2022 06:05) (129/84 - 174/96)  BP(mean): --  RR: 18 (12 Oct 2022 06:05) (17 - 18)  SpO2: 100% (12 Oct 2022 06:05) (96% - 100%)    Parameters below as of 12 Oct 2022 06:05  Patient On (Oxygen Delivery Method): nasal cannula  O2 Flow (L/min): 2    I&O's Detail  none recorded    Appearance: NAD  HEENT:   No JVD  Cardiovascular: Normal S1 S2  Respiratory: Decreased breath sounds bilaterally  Gastrointestinal:  Soft, Non-tender, + BS	  Neurologic: Non-focal  Extremities:     MEDICATIONS  (STANDING):  atorvastatin 40 milliGRAM(s) Oral at bedtime  budesonide 160 MICROgram(s)/formoterol 4.5 MICROgram(s) Inhaler 2 Puff(s) Inhalation two times a day  dextrose 5%. 1000 milliLiter(s) (50 mL/Hr) IV Continuous <Continuous>  dextrose 5%. 1000 milliLiter(s) (100 mL/Hr) IV Continuous <Continuous>  dextrose 50% Injectable 25 Gram(s) IV Push once  dextrose 50% Injectable 12.5 Gram(s) IV Push once  dextrose 50% Injectable 25 Gram(s) IV Push once  furosemide   Injectable 20 milliGRAM(s) IV Push every 12 hours  glucagon  Injectable 1 milliGRAM(s) IntraMuscular once  hydrALAZINE 25 milliGRAM(s) Oral three times a day  influenza  Vaccine (HIGH DOSE) 0.7 milliLiter(s) IntraMuscular once  insulin glargine Injectable (LANTUS) 32 Unit(s) SubCutaneous every morning  insulin lispro (ADMELOG) corrective regimen sliding scale   SubCutaneous three times a day before meals  insulin lispro (ADMELOG) corrective regimen sliding scale   SubCutaneous at bedtime  insulin lispro Injectable (ADMELOG) 10 Unit(s) SubCutaneous three times a day before meals  losartan 100 milliGRAM(s) Oral daily  metoprolol tartrate 25 milliGRAM(s) Oral two times a day    MEDICATIONS  (PRN):  acetaminophen     Tablet .. 650 milliGRAM(s) Oral every 6 hours PRN Temp greater or equal to 38C (100.4F), Mild Pain (1 - 3)  ALBUTerol    90 MICROgram(s) HFA Inhaler 1 Puff(s) Inhalation every 6 hours PRN Shortness of Breath  dextrose Oral Gel 15 Gram(s) Oral once PRN Blood Glucose LESS THAN 70 milliGRAM(s)/deciliter  melatonin 3 milliGRAM(s) Oral at bedtime PRN Insomnia  ondansetron Injectable 4 milliGRAM(s) IV Push every 8 hours PRN Nausea and/or Vomiting      LABS:	 	                          12.9   12.20 )-----------( 320      ( 12 Oct 2022 05:45 )             42.6     10-12    140  |  98  |  14  ----------------------------<  111<H>  3.9   |  32<H>  |  0.92    Ca    9.1      12 Oct 2022 05:45  Phos  4.2     10-12  Mg     1.90     10-12    TPro  7.3  /  Alb  4.0  /  TBili  0.5  /  DBili  x   /  AST  24  /  ALT  19  /  AlkPhos  105  10-12      proBNP: Serum Pro-Brain Natriuretic Peptide: 1206 pg/mL (10-10-22 @ 18:50)    < from: CT Angio Chest PE Protocol w/ IV Cont (10.10.22 @ 21:46) >  ACC: [id number] EXAM:  CT ABDOMEN AND PELVIS IC                        ACC: [id number] EXAM:  CT ANGIO CHEST PULM ART [facility]                          PROCEDURE DATE:  10/10/2022          INTERPRETATION:  CLINICAL INFORMATION: Cough and worsening dyspnea. Rule   out PE    COMPARISON: None available    CONTRAST/COMPLICATIONS:  IV Contrast: Omnipaque 350  90 cc administered   10 cc discarded  Oral Contrast: NONE  Complications: None reported at time of study completion    PROCEDURE:  CT Angiography of the Chest was performed followed by portal venous phase   imaging of the Abdomen and Pelvis.  Sagittal and coronal reformats were performed as well as 3D (MIP)   reconstructions.    FINDINGS:  CHEST:  LUNGS AND LARGE AIRWAYS: Patent central airways. Diffuse bilateral   groundglass opacities and interlobular septal thickening consistent with   pulmonary edema.  PLEURA: No pleural effusion.  VESSELS: Aortic and coronary artery calcifications. No main, right main,   left main, lobar or segmental pulmonary embolism. Limited evaluation of   subsegmental pulmonary arteries secondary to motion and mixing artifact.  HEART: Cardiomegaly. Trace pericardial effusion. Mitral valve annular   calcifications.  MEDIASTINUM AND YAAKOV: Prominent mediastinal lymph nodes measure less than   1.5 cm in short axis diameter.  CHEST WALL AND LOWER NECK: Within normal limits.    ABDOMEN AND PELVIS:  LIVER: Within normal limits.  BILE DUCTS: Normal caliber.  GALLBLADDER: Within normal limits.  SPLEEN: Within normal limits.  PANCREAS: Within normal limits.  ADRENALS: Within normal limits.  KIDNEYS/URETERS: Left renal cysts and additional bilateral hypodensities   too small characterize. No hydronephrosis.    BLADDER: Within normal limits.  REPRODUCTIVE ORGANS: Fibroid uterus. A 4.0 cm right adnexal cyst.    BOWEL: No bowel obstruction. Appendix is normal.  PERITONEUM: No ascites.  VESSELS: Atherosclerotic changes.  RETROPERITONEUM/LYMPH NODES: No lymphadenopathy.  ABDOMINAL WALL: Small fat-containing umbilicalhernia.  BONES: Degenerative changes.    IMPRESSION:  Cardiomegaly and moderate pulmonary edema.    No main, right main, left main, lobar or segmental pulmonary embolism.   Limited evaluation of subsegmental pulmonary arteries secondary to motion   and mixing artifact..        --- End of Report ---          LIZZ NATHAN MD; Resident Radiologist  This document has been electronically signed.  NICHOLAS RITCHIE MD; Attending Radiologist  This document has been electronically signed. Oct 10 2022 10:35PM    < end of copied text >  	  < from: Transthoracic Echocardiogram (10.11.22 @ 07:20) >    Patient name: JONNY WORTHY  YOB: 1950   Age: 71 (F)   MR#: [id number]  Study Date: 10/11/2022  [facility] Gertrudis Dowling RDCS  Study quality: Technically Difficult  Referring Physician: Keegan Plascencia MD, PhD  Blood Pressure: 134/86 mmHg  Height: 168 cm  Weight: 101 kg  BSA: 2.1 m2  ------------------------------------------------------------------------  PROCEDURE: Transthoracic echocardiogram with 2-D, M-Mode  and complete spectral and color flow Doppler.  INDICATION: Heart failure, unspecified (I50.9)  ------------------------------------------------------------------------  OBSERVATIONS:  Mitral Valve: Mitral annular calcification, otherwise  normal mitral valve. Minimal mitral regurgitation.  Aortic Root: Normal aortic root.  Aortic Valve: Aortic valve leaflet morphology not well  visualized.  Left Atrium: Normal left atrium.  Left Ventricle: Endocardium not well visualized; grossly  normal left ventricular systolic function.  Right Heart: Normalright atrium. The right ventricle is  not well visualized; grossly normal right ventricular  systolic function. Normal tricuspid valve. Minimal  tricuspid regurgitation. Normal pulmonic valve.  Pericardium/PleuraNormal pericardium with no pericardial  effusion.  ------------------------------------------------------------------------  CONCLUSIONS:  1. Mitral annular calcification, otherwise normal mitral  valve. Minimal mitral regurgitation.  2. Endocardium not well visualized; grossly normal left  ventricular systolic function.  3. The right ventricle is not well visualized; grossly  normal right ventricular systolic function.  ------------------------------------------------------------------------  Confirmed on  10/11/2022 - 08:38:01 by Merlin Wilkerson M.D.,  [facility], [facility]  ------------------------------------------------------------------------    < end of copied text >     Cardiology/Vascular Medicine Inpatient Progress Note    Echocardiogram reviewed -- normal biventricular systolic function  Appreciate Pulmonary and Thoracic Sx recommendations  Plan for bronchoscopy on 10/13/22 with Dr. Joshi. Team suspects air trapping.  ENT evaluation recommended for further evaluation of hemoptysis.    Vital Signs Last 24 Hrs  T(C): 36.7 (12 Oct 2022 06:05), Max: 36.8 (11 Oct 2022 19:04)  T(F): 98 (12 Oct 2022 06:05), Max: 98.2 (11 Oct 2022 19:04)  HR: 80 (12 Oct 2022 06:05) (80 - 92)  BP: 129/84 (12 Oct 2022 06:05) (129/84 - 174/96)  BP(mean): --  RR: 18 (12 Oct 2022 06:05) (17 - 18)  SpO2: 100% (12 Oct 2022 06:05) (96% - 100%)    Parameters below as of 12 Oct 2022 06:05  Patient On (Oxygen Delivery Method): nasal cannula  O2 Flow (L/min): 2    I&O's Detail  none recorded    Appearance: NAD  HEENT:   No JVD  Cardiovascular: Normal S1 S2  Respiratory: Decreased breath sounds bilaterally  Gastrointestinal:  Soft, Non-tender, + BS	  Neurologic: Non-focal  Extremities:     MEDICATIONS  (STANDING):  atorvastatin 40 milliGRAM(s) Oral at bedtime  budesonide 160 MICROgram(s)/formoterol 4.5 MICROgram(s) Inhaler 2 Puff(s) Inhalation two times a day  dextrose 5%. 1000 milliLiter(s) (50 mL/Hr) IV Continuous <Continuous>  dextrose 5%. 1000 milliLiter(s) (100 mL/Hr) IV Continuous <Continuous>  dextrose 50% Injectable 25 Gram(s) IV Push once  dextrose 50% Injectable 12.5 Gram(s) IV Push once  dextrose 50% Injectable 25 Gram(s) IV Push once  furosemide   Injectable 20 milliGRAM(s) IV Push every 12 hours  glucagon  Injectable 1 milliGRAM(s) IntraMuscular once  hydrALAZINE 25 milliGRAM(s) Oral three times a day  influenza  Vaccine (HIGH DOSE) 0.7 milliLiter(s) IntraMuscular once  insulin glargine Injectable (LANTUS) 32 Unit(s) SubCutaneous every morning  insulin lispro (ADMELOG) corrective regimen sliding scale   SubCutaneous three times a day before meals  insulin lispro (ADMELOG) corrective regimen sliding scale   SubCutaneous at bedtime  insulin lispro Injectable (ADMELOG) 10 Unit(s) SubCutaneous three times a day before meals  losartan 100 milliGRAM(s) Oral daily  metoprolol tartrate 25 milliGRAM(s) Oral two times a day    MEDICATIONS  (PRN):  acetaminophen     Tablet .. 650 milliGRAM(s) Oral every 6 hours PRN Temp greater or equal to 38C (100.4F), Mild Pain (1 - 3)  ALBUTerol    90 MICROgram(s) HFA Inhaler 1 Puff(s) Inhalation every 6 hours PRN Shortness of Breath  dextrose Oral Gel 15 Gram(s) Oral once PRN Blood Glucose LESS THAN 70 milliGRAM(s)/deciliter  melatonin 3 milliGRAM(s) Oral at bedtime PRN Insomnia  ondansetron Injectable 4 milliGRAM(s) IV Push every 8 hours PRN Nausea and/or Vomiting      LABS:	 	                          12.9   12.20 )-----------( 320      ( 12 Oct 2022 05:45 )             42.6     10-12    140  |  98  |  14  ----------------------------<  111<H>  3.9   |  32<H>  |  0.92    Ca    9.1      12 Oct 2022 05:45  Phos  4.2     10-12  Mg     1.90     10-12    TPro  7.3  /  Alb  4.0  /  TBili  0.5  /  DBili  x   /  AST  24  /  ALT  19  /  AlkPhos  105  10-12      proBNP: Serum Pro-Brain Natriuretic Peptide: 1206 pg/mL (10-10-22 @ 18:50)    < from: CT Angio Chest PE Protocol w/ IV Cont (10.10.22 @ 21:46) >  ACC: [id number] EXAM:  CT ABDOMEN AND PELVIS IC                        ACC: [id number] EXAM:  CT ANGIO CHEST PULM ART [facility]                          PROCEDURE DATE:  10/10/2022          INTERPRETATION:  CLINICAL INFORMATION: Cough and worsening dyspnea. Rule   out PE    COMPARISON: None available    CONTRAST/COMPLICATIONS:  IV Contrast: Omnipaque 350  90 cc administered   10 cc discarded  Oral Contrast: NONE  Complications: None reported at time of study completion    PROCEDURE:  CT Angiography of the Chest was performed followed by portal venous phase   imaging of the Abdomen and Pelvis.  Sagittal and coronal reformats were performed as well as 3D (MIP)   reconstructions.    FINDINGS:  CHEST:  LUNGS AND LARGE AIRWAYS: Patent central airways. Diffuse bilateral   groundglass opacities and interlobular septal thickening consistent with   pulmonary edema.  PLEURA: No pleural effusion.  VESSELS: Aortic and coronary artery calcifications. No main, right main,   left main, lobar or segmental pulmonary embolism. Limited evaluation of   subsegmental pulmonary arteries secondary to motion and mixing artifact.  HEART: Cardiomegaly. Trace pericardial effusion. Mitral valve annular   calcifications.  MEDIASTINUM AND YAAKOV: Prominent mediastinal lymph nodes measure less than   1.5 cm in short axis diameter.  CHEST WALL AND LOWER NECK: Within normal limits.    ABDOMEN AND PELVIS:  LIVER: Within normal limits.  BILE DUCTS: Normal caliber.  GALLBLADDER: Within normal limits.  SPLEEN: Within normal limits.  PANCREAS: Within normal limits.  ADRENALS: Within normal limits.  KIDNEYS/URETERS: Left renal cysts and additional bilateral hypodensities   too small characterize. No hydronephrosis.    BLADDER: Within normal limits.  REPRODUCTIVE ORGANS: Fibroid uterus. A 4.0 cm right adnexal cyst.    BOWEL: No bowel obstruction. Appendix is normal.  PERITONEUM: No ascites.  VESSELS: Atherosclerotic changes.  RETROPERITONEUM/LYMPH NODES: No lymphadenopathy.  ABDOMINAL WALL: Small fat-containing umbilicalhernia.  BONES: Degenerative changes.    IMPRESSION:  Cardiomegaly and moderate pulmonary edema.    No main, right main, left main, lobar or segmental pulmonary embolism.   Limited evaluation of subsegmental pulmonary arteries secondary to motion   and mixing artifact..        --- End of Report ---          LIZZ NATHAN MD; Resident Radiologist  This document has been electronically signed.  NICHOLAS RITCHIE MD; Attending Radiologist  This document has been electronically signed. Oct 10 2022 10:35PM    < end of copied text >  	  < from: Transthoracic Echocardiogram (10.11.22 @ 07:20) >    Patient name: JONNY WORTHY  YOB: 1950   Age: 71 (F)   MR#: [id number]  Study Date: 10/11/2022  [facility] Gertrudis Dow[facility] RDCS  Study quality: Technically Difficult  Referring Physician: Keegan Plascencia MD, PhD  Blood Pressure: 134/86 mmHg  Height: 168 cm  Weight: 101 kg  BSA: 2.1 m2  ------------------------------------------------------------------------  PROCEDURE: Transthoracic echocardiogram with 2-D, M-Mode  and complete spectral and color flow Doppler.  INDICATION: Heart failure, unspecified (I50.9)  ------------------------------------------------------------------------  OBSERVATIONS:  Mitral Valve: Mitral annular calcification, otherwise  normal mitral valve. Minimal mitral regurgitation.  Aortic Root: Normal aortic root.  Aortic Valve: Aortic valve leaflet morphology not well  visualized.  Left Atrium: Normal left atrium.  Left Ventricle: Endocardium not well visualized; grossly  normal left ventricular systolic function.  Right Heart: Normalright atrium. The right ventricle is  not well visualized; grossly normal right ventricular  systolic function. Normal tricuspid valve. Minimal  tricuspid regurgitation. Normal pulmonic valve.  Pericardium/PleuraNormal pericardium with no pericardial  effusion.  ------------------------------------------------------------------------  CONCLUSIONS:  1. Mitral annular calcification, otherwise normal mitral  valve. Minimal mitral regurgitation.  2. Endocardium not well visualized; grossly normal left  ventricular systolic function.  3. The right ventricle is not well visualized; grossly  normal right ventricular systolic function.  ------------------------------------------------------------------------  Confirmed on  10/11/2022 - 08:38:01 by Merlin Wilkerson M.D.,  [facility], [facility]  ------------------------------------------------------------------------    < end of copied text >

## 2022-10-12 NOTE — CONSULT NOTE ADULT - SUBJECTIVE AND OBJECTIVE BOX
Patient is a 71y old  Female who presents with a chief complaint of Hemoptysis       HPI:  70yo Female with MHx aneurysm, HTN, DM Type 2, HLD, h/o Covid-19  (residual symptoms, home oxygen intermittently, NC 2L/min PRN, diagnosed with chronic lung disease 2/2 covid-19) c/o coughing up bright red blood today x 2 episodes. Pt provides a white towel with several bloody stains (2x2cm) from home and a bottle with a blood clot (0.5x2cm in size).  No prior episodes like that in the past. Reports 3 weeks of coughing with worsening SOB. Also reports associated worsening bilateral lower extremity swelling. States has had no palpitations, CP, fevers, Abd pain, n/v/d; Of note, patient was treated with Azithromycin x 5 days by PCP with no relief. Reports no recent travel, no hx of TB, non-smoker. I feel fine today .       PAST MEDICAL & SURGICAL HISTORY:  Diabetes mellitus      Hypertension      COVID-19      No significant past surgical history          Social History: no smoking etc.     FAMILY HISTORY:  FH: lung cancer (Father)  smoker    FH: diabetes mellitus (Mother, Sibling, Grandparent)        Allergies    penicillin (Unknown)    Intolerances        REVIEW OF SYSTEMS:    CONSTITUTIONAL: No fever, weight loss, or fatigue  EYES: No eye pain, visual disturbances, or discharge  RESPIRATORY: No cough, wheezing, chills; No shortness of breath  CARDIOVASCULAR: No chest pain, palpitations, dizziness, or leg swelling  GASTROINTESTINAL: No abdominal or epigastric pain. No nausea, vomiting, or hematemesis; No diarrhea or constipation. No melena or hematochezia.  GENITOURINARY: No dysuria, frequency, hematuria, or incontinence  NEUROLOGICAL: No headaches, memory loss, loss of strength, numbness, or tremors  SKIN: No itching, burning, rashes, or lesions   ENDOCRINE: No heat or cold intolerance; No hair loss  MUSCULOSKELETAL: No joint pain or swelling; No muscle, back, or extremity pain  PSYCHIATRIC: No depression, anxiety, mood swings, or difficulty sleeping      MEDICATIONS  (STANDING):  atorvastatin 40 milliGRAM(s) Oral at bedtime  budesonide 160 MICROgram(s)/formoterol 4.5 MICROgram(s) Inhaler 2 Puff(s) Inhalation two times a day  dextrose 5%. 1000 milliLiter(s) (50 mL/Hr) IV Continuous <Continuous>  dextrose 5%. 1000 milliLiter(s) (100 mL/Hr) IV Continuous <Continuous>  dextrose 50% Injectable 25 Gram(s) IV Push once  dextrose 50% Injectable 12.5 Gram(s) IV Push once  dextrose 50% Injectable 25 Gram(s) IV Push once  glucagon  Injectable 1 milliGRAM(s) IntraMuscular once  hydrALAZINE 25 milliGRAM(s) Oral three times a day  influenza  Vaccine (HIGH DOSE) 0.7 milliLiter(s) IntraMuscular once  insulin glargine Injectable (LANTUS) 32 Unit(s) SubCutaneous every morning  insulin lispro (ADMELOG) corrective regimen sliding scale   SubCutaneous three times a day before meals  insulin lispro (ADMELOG) corrective regimen sliding scale   SubCutaneous at bedtime  insulin lispro Injectable (ADMELOG) 10 Unit(s) SubCutaneous three times a day before meals  losartan 100 milliGRAM(s) Oral daily  metoprolol tartrate 25 milliGRAM(s) Oral two times a day    MEDICATIONS  (PRN):  acetaminophen     Tablet .. 650 milliGRAM(s) Oral every 6 hours PRN Temp greater or equal to 38C (100.4F), Mild Pain (1 - 3)  ALBUTerol    90 MICROgram(s) HFA Inhaler 1 Puff(s) Inhalation every 6 hours PRN Shortness of Breath  dextrose Oral Gel 15 Gram(s) Oral once PRN Blood Glucose LESS THAN 70 milliGRAM(s)/deciliter  melatonin 3 milliGRAM(s) Oral at bedtime PRN Insomnia  ondansetron Injectable 4 milliGRAM(s) IV Push every 8 hours PRN Nausea and/or Vomiting      Vital Signs Last 24 Hrs  T(C): 37 (12 Oct 2022 21:00), Max: 37.1 (12 Oct 2022 18:09)  T(F): 98.6 (12 Oct 2022 21:00), Max: 98.7 (12 Oct 2022 18:09)  HR: 67 (12 Oct 2022 21:00) (66 - 81)  BP: 153/93 (12 Oct 2022 21:00) (121/84 - 153/93)  BP(mean): --  RR: 18 (12 Oct 2022 21:00) (18 - 19)  SpO2: 100% (12 Oct 2022 21:00) (94% - 100%)    Parameters below as of 12 Oct 2022 21:00  Patient On (Oxygen Delivery Method): nasal cannula  O2 Flow (L/min): 2      PHYSICAL EXAM:    GENERAL: NAD, well-groomed, well-developed  HEAD:  Atraumatic, Normocephalic  NECK: Supple, No JVD, Normal thyroid  NERVOUS SYSTEM:  Alert & Oriented X3, No new  focal sign .  CHEST/LUNG: Air entry good bilaterally; No rales, rhonchi, wheezing, or rubs  HEART: Regular rate and rhythm; No murmurs, rubs, or gallops  ABDOMEN: Soft, Nontender, Nondistended; Bowel sounds present  EXTREMITIES:  2+ Peripheral Pulses, No clubbing, cyanosis, or edema    LABS:                        12.9   12.20 )-----------( 320      ( 12 Oct 2022 05:45 )             42.6     10-12    140  |  98  |  14  ----------------------------<  111<H>  3.9   |  32<H>  |  0.92    Ca    9.1      12 Oct 2022 05:45  Phos  4.2     10-12  Mg     1.90     10-12    TPro  7.3  /  Alb  4.0  /  TBili  0.5  /  DBili  x   /  AST  24  /  ALT  19  /  AlkPhos  105  10-12    PT/INR - ( 12 Oct 2022 05:45 )   PT: 12.3 sec;   INR: 1.06 ratio         PTT - ( 12 Oct 2022 05:45 )  PTT:31.5 sec        RADIOLOGY & ADDITIONAL STUDIES:

## 2022-10-12 NOTE — PROGRESS NOTE ADULT - SUBJECTIVE AND OBJECTIVE BOX
Patient seen at bedside, resting comfortably. States that her breathing is very improved since yesterday.  Denies any hemoptysis or blood in sputum since her episode yesterday morning which brought her into the ED.  Understands that she is planned for a Bronchoscopy tomorrow and is agreeable to procedure.  Denies chest pain, new SOB, n/v/d, fevers or chills.    Vital Signs:  Vital Signs Last 24 Hrs  T(C): 36.6 (10-12-22 @ 09:05), Max: 36.8 (10-11-22 @ 19:04)  T(F): 97.9 (10-12-22 @ 09:05), Max: 98.2 (10-11-22 @ 19:04)  HR: 81 (10-12-22 @ 09:05) (80 - 92)  BP: 121/84 (10-12-22 @ 09:05) (121/84 - 174/96)  RR: 18 (10-12-22 @ 09:05) (17 - 18)  SpO2: 100% (10-12-22 @ 09:05) (96% - 100%)     PHYSICAL EXAM:  General: Well appearing, NAD  Eyes: Vision grossly intact, EOMI  ENMT: Hearing grossly intact. Airway grossly patent, no stridor  Neck: Neck supple, trachea midline  Cardiovascular: RRR, S1S2, well perfused  Respiratory: Breathing comfortably on O2 via NC, no respiratory distress, no accessory muscle use.  Gastrointestinal: Soft, NT, ND, +BS  Extremities: DIALY x4  Vascular: Well perfused  Neurological: Nonfocal, no deficits  Psychiatric: Appropriate affect      Relevant labs, radiology and Medications reviewed    < from: CT Angio Chest PE Protocol w/ IV Cont (10.10.22 @ 21:46) >  ACC: [id number] EXAM:  CT ABDOMEN AND PELVIS IC                        ACC: [id number] EXAM:  CT ANGIO CHEST PULM [facility]                          PROCEDURE DATE:  10/10/2022          INTERPRETATION:  CLINICAL INFORMATION: Cough and worsening dyspnea. Rule   out PE    COMPARISON: None available    CONTRAST/COMPLICATIONS:  IV Contrast: Omnipaque 350  90 cc administered   10 cc discarded  Oral Contrast: NONE  Complications: None reported at time of study completion    PROCEDURE:  CT Angiography of the Chest was performed followed by portal venous phase   imaging of the Abdomen and Pelvis.  Sagittal and coronal reformats were performed as well as 3D (MIP)   reconstructions.    FINDINGS:  CHEST:  LUNGS AND LARGE AIRWAYS: Patent central airways. Diffuse bilateral   groundglass opacities and interlobular septal thickening consistent with   pulmonary edema.  PLEURA: No pleural effusion.  VESSELS: Aortic and coronary artery calcifications. No main, right main,   left main, lobar or segmental pulmonary embolism. Limited evaluation of   subsegmental pulmonary arteries secondary to motion and mixing artifact.  HEART: Cardiomegaly. Trace pericardial effusion. Mitral valve annular   calcifications.  MEDIASTINUM AND YAAKOV: Prominent mediastinal lymph nodes measure less than   1.5 cm in short axis diameter.  CHEST WALL AND LOWER NECK: Within normal limits.    ABDOMEN AND PELVIS:  LIVER: Within normal limits.  BILE DUCTS: Normal caliber.  GALLBLADDER: Within normal limits.  SPLEEN: Within normal limits.  PANCREAS: Within normal limits.  ADRENALS: Within normal limits.  KIDNEYS/URETERS: Left renal cysts and additional bilateral hypodensities   too small characterize. No hydronephrosis.    BLADDER: Within normal limits.  REPRODUCTIVE ORGANS: Fibroid uterus. A 4.0 cm right adnexal cyst.    BOWEL: No bowel obstruction. Appendix is normal.  PERITONEUM: No ascites.  VESSELS: Atherosclerotic changes.  RETROPERITONEUM/LYMPH NODES: No lymphadenopathy.  ABDOMINAL WALL: Small fat-containing umbilicalhernia.  BONES: Degenerative changes.    IMPRESSION:  Cardiomegaly and moderate pulmonary edema.    No main, right main, left main, lobar or segmental pulmonary embolism.   Limited evaluation of subsegmental pulmonary arteries secondary to motion   and mixing artifact..        --- End of Report ---          LIZZ NATHAN MD; Resident Radiologist  This document has been electronically signed.  NICHOLAS RITCHIE MD; Attending Radiologist  This document has been electronically signed. Oct 10 2022 10:35PM    < end of copied text >                          12.9   12.20 )-----------( 320      ( 12 Oct 2022 05:45 )             42.6     10-12    140  |  98  |  14  ----------------------------<  111<H>  3.9   |  32<H>  |  0.92    Ca    9.1      12 Oct 2022 05:45  Phos  4.2     10-12  Mg     1.90     10-12    TPro  7.3  /  Alb  4.0  /  TBili  0.5  /  DBili  x   /  AST  24  /  ALT  19  /  AlkPhos  105  10-12    PT/INR - ( 12 Oct 2022 05:45 )   PT: 12.3 sec;   INR: 1.06 ratio         PTT - ( 12 Oct 2022 05:45 )  PTT:31.5 sec  MEDICATIONS  (STANDING):  atorvastatin 40 milliGRAM(s) Oral at bedtime  budesonide 160 MICROgram(s)/formoterol 4.5 MICROgram(s) Inhaler 2 Puff(s) Inhalation two times a day  dextrose 5%. 1000 milliLiter(s) (50 mL/Hr) IV Continuous <Continuous>  dextrose 5%. 1000 milliLiter(s) (100 mL/Hr) IV Continuous <Continuous>  dextrose 50% Injectable 25 Gram(s) IV Push once  dextrose 50% Injectable 12.5 Gram(s) IV Push once  dextrose 50% Injectable 25 Gram(s) IV Push once  furosemide   Injectable 20 milliGRAM(s) IV Push every 12 hours  glucagon  Injectable 1 milliGRAM(s) IntraMuscular once  hydrALAZINE 25 milliGRAM(s) Oral three times a day  influenza  Vaccine (HIGH DOSE) 0.7 milliLiter(s) IntraMuscular once  insulin glargine Injectable (LANTUS) 32 Unit(s) SubCutaneous every morning  insulin lispro (ADMELOG) corrective regimen sliding scale   SubCutaneous three times a day before meals  insulin lispro (ADMELOG) corrective regimen sliding scale   SubCutaneous at bedtime  insulin lispro Injectable (ADMELOG) 10 Unit(s) SubCutaneous three times a day before meals  losartan 100 milliGRAM(s) Oral daily  metoprolol tartrate 25 milliGRAM(s) Oral two times a day    MEDICATIONS  (PRN):  acetaminophen     Tablet .. 650 milliGRAM(s) Oral every 6 hours PRN Temp greater or equal to 38C (100.4F), Mild Pain (1 - 3)  ALBUTerol    90 MICROgram(s) HFA Inhaler 1 Puff(s) Inhalation every 6 hours PRN Shortness of Breath  dextrose Oral Gel 15 Gram(s) Oral once PRN Blood Glucose LESS THAN 70 milliGRAM(s)/deciliter  melatonin 3 milliGRAM(s) Oral at bedtime PRN Insomnia  ondansetron Injectable 4 milliGRAM(s) IV Push every 8 hours PRN Nausea and/or Vomiting    Pertinent Physical Exam  I&O's Summary      Assessment  72yo Female with MHx aneurysm, HTN, DM Type 2, HLD, h/o Covid-19  (residual symptoms, home oxygen intermittently, NC 2L/min PRN, diagnosed with chronic lung disease 2/2 covid-19) admitted to [facility] on 10/11 with c/o coughing up bright red blood x 2 episodes.   10/12: patient with no hemoptysis x24hrs. FB planned for tomorrow.    PLAN  -Bronchoscopy planned for 10/13.  -Needs pre-op workup for tomorrow, CBC, BMP, COVID, T&S, Coags  -NPO at midnight  -Hold ASA, avoid diuretics  -Continue remainder of care per primary team.   Patient seen at bedside, resting comfortably. States that her breathing is very improved since yesterday.  Denies any hemoptysis or blood in sputum since her episode yesterday morning which brought her into the ED.  Understands that she is planned for a Bronchoscopy tomorrow and is agreeable to procedure.  Denies chest pain, new SOB, n/v/d, fevers or chills.    Vital Signs:  Vital Signs Last 24 Hrs  T(C): 36.6 (10-12-22 @ 09:05), Max: 36.8 (10-11-22 @ 19:04)  T(F): 97.9 (10-12-22 @ 09:05), Max: 98.2 (10-11-22 @ 19:04)  HR: 81 (10-12-22 @ 09:05) (80 - 92)  BP: 121/84 (10-12-22 @ 09:05) (121/84 - 174/96)  RR: 18 (10-12-22 @ 09:05) (17 - 18)  SpO2: 100% (10-12-22 @ 09:05) (96% - 100%)     PHYSICAL EXAM:  General: Well appearing, NAD  Eyes: Vision grossly intact, EOMI  ENMT: Hearing grossly intact. Airway grossly patent, no stridor  Neck: Neck supple, trachea midline  Cardiovascular: RRR, S1S2, well perfused  Respiratory: Breathing comfortably on O2 via NC, no respiratory distress, no accessory muscle use.  Gastrointestinal: Soft, NT, ND, +BS  Extremities: DAILY x4  Vascular: Well perfused  Neurological: Nonfocal, no deficits  Psychiatric: Appropriate affect      Relevant labs, radiology and Medications reviewed    < from: CT Angio Chest PE Protocol w/ IV Cont (10.10.22 @ 21:46) >  ACC: [id number] EXAM:  CT ABDOMEN AND PELVIS IC                        ACC: [id number] EXAM:  CT ANGIO CHEST PULM [facility]                          PROCEDURE DATE:  10/10/2022          INTERPRETATION:  CLINICAL INFORMATION: Cough and worsening dyspnea. Rule   out PE    COMPARISON: None available    CONTRAST/COMPLICATIONS:  IV Contrast: Omnipaque 350  90 cc administered   10 cc discarded  Oral Contrast: NONE  Complications: None reported at time of study completion    PROCEDURE:  CT Angiography of the Chest was performed followed by portal venous phase   imaging of the Abdomen and Pelvis.  Sagittal and coronal reformats were performed as well as 3D (MIP)   reconstructions.    FINDINGS:  CHEST:  LUNGS AND LARGE AIRWAYS: Patent central airways. Diffuse bilateral   groundglass opacities and interlobular septal thickening consistent with   pulmonary edema.  PLEURA: No pleural effusion.  VESSELS: Aortic and coronary artery calcifications. No main, right main,   left main, lobar or segmental pulmonary embolism. Limited evaluation of   subsegmental pulmonary arteries secondary to motion and mixing artifact.  HEART: Cardiomegaly. Trace pericardial effusion. Mitral valve annular   calcifications.  MEDIASTINUM AND YAAKOV: Prominent mediastinal lymph nodes measure less than   1.5 cm in short axis diameter.  CHEST WALL AND LOWER NECK: Within normal limits.    ABDOMEN AND PELVIS:  LIVER: Within normal limits.  BILE DUCTS: Normal caliber.  GALLBLADDER: Within normal limits.  SPLEEN: Within normal limits.  PANCREAS: Within normal limits.  ADRENALS: Within normal limits.  KIDNEYS/URETERS: Left renal cysts and additional bilateral hypodensities   too small characterize. No hydronephrosis.    BLADDER: Within normal limits.  REPRODUCTIVE ORGANS: Fibroid uterus. A 4.0 cm right adnexal cyst.    BOWEL: No bowel obstruction. Appendix is normal.  PERITONEUM: No ascites.  VESSELS: Atherosclerotic changes.  RETROPERITONEUM/LYMPH NODES: No lymphadenopathy.  ABDOMINAL WALL: Small fat-containing umbilicalhernia.  BONES: Degenerative changes.    IMPRESSION:  Cardiomegaly and moderate pulmonary edema.    No main, right main, left main, lobar or segmental pulmonary embolism.   Limited evaluation of subsegmental pulmonary arteries secondary to motion   and mixing artifact..        --- End of Report ---          LIZZ NATHAN MD; Resident Radiologist  This document has been electronically signed.  NICHOLAS RITCHIE MD; Attending Radiologist  This document has been electronically signed. Oct 10 2022 10:35PM    < end of copied text >                          12.9   12.20 )-----------( 320      ( 12 Oct 2022 05:45 )             42.6     10-12    140  |  98  |  14  ----------------------------<  111<H>  3.9   |  32<H>  |  0.92    Ca    9.1      12 Oct 2022 05:45  Phos  4.2     10-12  Mg     1.90     10-12    TPro  7.3  /  Alb  4.0  /  TBili  0.5  /  DBili  x   /  AST  24  /  ALT  19  /  AlkPhos  105  10-12    PT/INR - ( 12 Oct 2022 05:45 )   PT: 12.3 sec;   INR: 1.06 ratio         PTT - ( 12 Oct 2022 05:45 )  PTT:31.5 sec  MEDICATIONS  (STANDING):  atorvastatin 40 milliGRAM(s) Oral at bedtime  budesonide 160 MICROgram(s)/formoterol 4.5 MICROgram(s) Inhaler 2 Puff(s) Inhalation two times a day  dextrose 5%. 1000 milliLiter(s) (50 mL/Hr) IV Continuous <Continuous>  dextrose 5%. 1000 milliLiter(s) (100 mL/Hr) IV Continuous <Continuous>  dextrose 50% Injectable 25 Gram(s) IV Push once  dextrose 50% Injectable 12.5 Gram(s) IV Push once  dextrose 50% Injectable 25 Gram(s) IV Push once  furosemide   Injectable 20 milliGRAM(s) IV Push every 12 hours  glucagon  Injectable 1 milliGRAM(s) IntraMuscular once  hydrALAZINE 25 milliGRAM(s) Oral three times a day  influenza  Vaccine (HIGH DOSE) 0.7 milliLiter(s) IntraMuscular once  insulin glargine Injectable (LANTUS) 32 Unit(s) SubCutaneous every morning  insulin lispro (ADMELOG) corrective regimen sliding scale   SubCutaneous three times a day before meals  insulin lispro (ADMELOG) corrective regimen sliding scale   SubCutaneous at bedtime  insulin lispro Injectable (ADMELOG) 10 Unit(s) SubCutaneous three times a day before meals  losartan 100 milliGRAM(s) Oral daily  metoprolol tartrate 25 milliGRAM(s) Oral two times a day    MEDICATIONS  (PRN):  acetaminophen     Tablet .. 650 milliGRAM(s) Oral every 6 hours PRN Temp greater or equal to 38C (100.4F), Mild Pain (1 - 3)  ALBUTerol    90 MICROgram(s) HFA Inhaler 1 Puff(s) Inhalation every 6 hours PRN Shortness of Breath  dextrose Oral Gel 15 Gram(s) Oral once PRN Blood Glucose LESS THAN 70 milliGRAM(s)/deciliter  melatonin 3 milliGRAM(s) Oral at bedtime PRN Insomnia  ondansetron Injectable 4 milliGRAM(s) IV Push every 8 hours PRN Nausea and/or Vomiting    Pertinent Physical Exam  I&O's Summary      Assessment  72yo Female with MHx aneurysm, HTN, DM Type 2, HLD, h/o Covid-19  (residual symptoms, home oxygen intermittently, NC 2L/min PRN, diagnosed with chronic lung disease 2/2 covid-19) admitted to [facility] on 10/11 with c/o coughing up bright red blood x 2 episodes.   10/12: patient with no hemoptysis x24hrs. FB planned for tomorrow.    PLAN  -Bronchoscopy planned for 10/13.  -Needs pre-op workup for tomorrow, CBC, BMP, COVID, T&S, Coags  -Recommend ENT consult for further w/u of etiology  -NPO at midnight  -Hold ASA, avoid diuretics  -Continue remainder of care per primary team.

## 2022-10-13 ENCOUNTER — TRANSCRIPTION ENCOUNTER (OUTPATIENT)
Age: 72
End: 2022-10-13

## 2022-10-13 ENCOUNTER — APPOINTMENT (OUTPATIENT)
Dept: THORACIC SURGERY | Facility: HOSPITAL | Age: 72
End: 2022-10-13

## 2022-10-13 LAB
ANION GAP SERPL CALC-SCNC: 12 MMOL/L — SIGNIFICANT CHANGE UP (ref 7–14)
APTT BLD: 29.7 SEC — SIGNIFICANT CHANGE UP (ref 27–36.3)
BLD GP AB SCN SERPL QL: NEGATIVE — SIGNIFICANT CHANGE UP
BUN SERPL-MCNC: 13 MG/DL — SIGNIFICANT CHANGE UP (ref 7–23)
CALCIUM SERPL-MCNC: 9 MG/DL — SIGNIFICANT CHANGE UP (ref 8.4–10.5)
CHLORIDE SERPL-SCNC: 98 MMOL/L — SIGNIFICANT CHANGE UP (ref 98–107)
CO2 SERPL-SCNC: 30 MMOL/L — SIGNIFICANT CHANGE UP (ref 22–31)
CREAT SERPL-MCNC: 0.81 MG/DL — SIGNIFICANT CHANGE UP (ref 0.5–1.3)
EGFR: 78 ML/MIN/1.73M2 — SIGNIFICANT CHANGE UP
GLUCOSE BLDC GLUCOMTR-MCNC: 116 MG/DL — HIGH (ref 70–99)
GLUCOSE BLDC GLUCOMTR-MCNC: 120 MG/DL — HIGH (ref 70–99)
GLUCOSE BLDC GLUCOMTR-MCNC: 123 MG/DL — HIGH (ref 70–99)
GLUCOSE BLDC GLUCOMTR-MCNC: 133 MG/DL — HIGH (ref 70–99)
GLUCOSE BLDC GLUCOMTR-MCNC: 136 MG/DL — HIGH (ref 70–99)
GLUCOSE BLDC GLUCOMTR-MCNC: 145 MG/DL — HIGH (ref 70–99)
GLUCOSE BLDC GLUCOMTR-MCNC: 146 MG/DL — HIGH (ref 70–99)
GLUCOSE SERPL-MCNC: 111 MG/DL — HIGH (ref 70–99)
HCT VFR BLD CALC: 39 % — SIGNIFICANT CHANGE UP (ref 34.5–45)
HGB BLD-MCNC: 12 G/DL — SIGNIFICANT CHANGE UP (ref 11.5–15.5)
INR BLD: 0.99 RATIO — SIGNIFICANT CHANGE UP (ref 0.88–1.16)
MAGNESIUM SERPL-MCNC: 2.1 MG/DL — SIGNIFICANT CHANGE UP (ref 1.6–2.6)
MCHC RBC-ENTMCNC: 27.1 PG — SIGNIFICANT CHANGE UP (ref 27–34)
MCHC RBC-ENTMCNC: 30.8 GM/DL — LOW (ref 32–36)
MCV RBC AUTO: 88.2 FL — SIGNIFICANT CHANGE UP (ref 80–100)
NRBC # BLD: 0 /100 WBCS — SIGNIFICANT CHANGE UP (ref 0–0)
NRBC # FLD: 0 K/UL — SIGNIFICANT CHANGE UP (ref 0–0)
PHOSPHATE SERPL-MCNC: 4.2 MG/DL — SIGNIFICANT CHANGE UP (ref 2.5–4.5)
PLATELET # BLD AUTO: 291 K/UL — SIGNIFICANT CHANGE UP (ref 150–400)
POTASSIUM SERPL-MCNC: 3.7 MMOL/L — SIGNIFICANT CHANGE UP (ref 3.5–5.3)
POTASSIUM SERPL-SCNC: 3.7 MMOL/L — SIGNIFICANT CHANGE UP (ref 3.5–5.3)
PROTHROM AB SERPL-ACNC: 11.5 SEC — SIGNIFICANT CHANGE UP (ref 10.5–13.4)
RBC # BLD: 4.42 M/UL — SIGNIFICANT CHANGE UP (ref 3.8–5.2)
RBC # FLD: 14.6 % — HIGH (ref 10.3–14.5)
RH IG SCN BLD-IMP: POSITIVE — SIGNIFICANT CHANGE UP
SODIUM SERPL-SCNC: 140 MMOL/L — SIGNIFICANT CHANGE UP (ref 135–145)
WBC # BLD: 8.54 K/UL — SIGNIFICANT CHANGE UP (ref 3.8–10.5)
WBC # FLD AUTO: 8.54 K/UL — SIGNIFICANT CHANGE UP (ref 3.8–10.5)

## 2022-10-13 PROCEDURE — 31622 DX BRONCHOSCOPE/WASH: CPT

## 2022-10-13 PROCEDURE — 99232 SBSQ HOSP IP/OBS MODERATE 35: CPT | Mod: FS

## 2022-10-13 PROCEDURE — 99233 SBSQ HOSP IP/OBS HIGH 50: CPT

## 2022-10-13 RX ORDER — IPRATROPIUM/ALBUTEROL SULFATE 18-103MCG
3 AEROSOL WITH ADAPTER (GRAM) INHALATION ONCE
Refills: 0 | Status: COMPLETED | OUTPATIENT
Start: 2022-10-13 | End: 2022-10-13

## 2022-10-13 RX ADMIN — BUDESONIDE AND FORMOTEROL FUMARATE DIHYDRATE 2 PUFF(S): 160; 4.5 AEROSOL RESPIRATORY (INHALATION) at 21:26

## 2022-10-13 RX ADMIN — BUDESONIDE AND FORMOTEROL FUMARATE DIHYDRATE 2 PUFF(S): 160; 4.5 AEROSOL RESPIRATORY (INHALATION) at 10:24

## 2022-10-13 RX ADMIN — Medication 25 MILLIGRAM(S): at 21:27

## 2022-10-13 RX ADMIN — Medication 25 MILLIGRAM(S): at 18:46

## 2022-10-13 RX ADMIN — Medication 25 MILLIGRAM(S): at 05:45

## 2022-10-13 RX ADMIN — ATORVASTATIN CALCIUM 40 MILLIGRAM(S): 80 TABLET, FILM COATED ORAL at 21:26

## 2022-10-13 RX ADMIN — Medication 25 MILLIGRAM(S): at 05:46

## 2022-10-13 RX ADMIN — Medication 650 MILLIGRAM(S): at 06:46

## 2022-10-13 RX ADMIN — Medication 25 MILLIGRAM(S): at 18:45

## 2022-10-13 RX ADMIN — ONDANSETRON 4 MILLIGRAM(S): 8 TABLET, FILM COATED ORAL at 21:36

## 2022-10-13 RX ADMIN — Medication 650 MILLIGRAM(S): at 22:15

## 2022-10-13 RX ADMIN — INSULIN GLARGINE 32 UNIT(S): 100 INJECTION, SOLUTION SUBCUTANEOUS at 08:30

## 2022-10-13 RX ADMIN — Medication 10 UNIT(S): at 17:24

## 2022-10-13 RX ADMIN — Medication 1 TABLET(S): at 21:31

## 2022-10-13 RX ADMIN — LOSARTAN POTASSIUM 100 MILLIGRAM(S): 100 TABLET, FILM COATED ORAL at 05:46

## 2022-10-13 RX ADMIN — Medication 3 MILLIGRAM(S): at 21:27

## 2022-10-13 RX ADMIN — Medication 650 MILLIGRAM(S): at 21:25

## 2022-10-13 RX ADMIN — Medication 3 MILLILITER(S): at 14:34

## 2022-10-13 RX ADMIN — Medication 650 MILLIGRAM(S): at 05:59

## 2022-10-13 NOTE — PROGRESS NOTE ADULT - SUBJECTIVE AND OBJECTIVE BOX
Cardiology/Vascular Medicine Inpatient Progress Note    Echocardiogram reviewed -- normal biventricular systolic function  Appreciate Pulmonary and Thoracic Sx recommendations  Plan for bronchoscopy today    Vital Signs Last 24 Hrs  T(C): 36.4 (13 Oct 2022 15:00), Max: 37.1 (12 Oct 2022 18:09)  T(F): 97.5 (13 Oct 2022 15:00), Max: 98.8 (13 Oct 2022 09:16)  HR: 84 (13 Oct 2022 15:30) (65 - 100)  BP: 125/90 (13 Oct 2022 15:30) (125/90 - 175/100)  BP(mean): 95 (13 Oct 2022 15:30) (78 - 99)  RR: 17 (13 Oct 2022 15:30) (13 - 24)  SpO2: 100% (13 Oct 2022 15:30) (94% - 100%)    Parameters below as of 13 Oct 2022 15:30  Patient On (Oxygen Delivery Method): nasal cannula  O2 Flow (L/min): 2    I&O's Detail    12 Oct 2022 07:01  -  13 Oct 2022 07:00  --------------------------------------------------------  IN:    Oral Fluid: 115 mL  Total IN: 115 mL    OUT:    Voided (mL): 800 mL  Total OUT: 800 mL    Total NET: -685 mL    Appearance: NAD  HEENT:   No JVD  Cardiovascular: Normal S1 S2  Respiratory: Decreased breath sounds bilaterally  Gastrointestinal:  Soft, Non-tender, + BS	  Neurologic: Non-focal    MEDICATIONS  (STANDING):  atorvastatin 40 milliGRAM(s) Oral at bedtime  budesonide 160 MICROgram(s)/formoterol 4.5 MICROgram(s) Inhaler 2 Puff(s) Inhalation two times a day  dextrose 5%. 1000 milliLiter(s) (50 mL/Hr) IV Continuous <Continuous>  dextrose 5%. 1000 milliLiter(s) (100 mL/Hr) IV Continuous <Continuous>  dextrose 50% Injectable 25 Gram(s) IV Push once  dextrose 50% Injectable 12.5 Gram(s) IV Push once  dextrose 50% Injectable 25 Gram(s) IV Push once  glucagon  Injectable 1 milliGRAM(s) IntraMuscular once  hydrALAZINE 25 milliGRAM(s) Oral three times a day  influenza  Vaccine (HIGH DOSE) 0.7 milliLiter(s) IntraMuscular once  insulin glargine Injectable (LANTUS) 32 Unit(s) SubCutaneous every morning  insulin lispro (ADMELOG) corrective regimen sliding scale   SubCutaneous three times a day before meals  insulin lispro (ADMELOG) corrective regimen sliding scale   SubCutaneous at bedtime  insulin lispro Injectable (ADMELOG) 10 Unit(s) SubCutaneous three times a day before meals  losartan 100 milliGRAM(s) Oral daily  metoprolol tartrate 25 milliGRAM(s) Oral two times a day  multivitamin 1 Tablet(s) Oral daily    MEDICATIONS  (PRN):  acetaminophen     Tablet .. 650 milliGRAM(s) Oral every 6 hours PRN Temp greater or equal to 38C (100.4F), Mild Pain (1 - 3)  ALBUTerol    90 MICROgram(s) HFA Inhaler 1 Puff(s) Inhalation every 6 hours PRN Shortness of Breath  dextrose Oral Gel 15 Gram(s) Oral once PRN Blood Glucose LESS THAN 70 milliGRAM(s)/deciliter  melatonin 3 milliGRAM(s) Oral at bedtime PRN Insomnia  ondansetron Injectable 4 milliGRAM(s) IV Push every 8 hours PRN Nausea and/or Vomiting      LABS:	 	                          12.0   8.54  )-----------( 291      ( 13 Oct 2022 06:00 )             39.0   10-13    140  |  98  |  13  ----------------------------<  111<H>  3.7   |  30  |  0.81    Ca    9.0      13 Oct 2022 06:00  Phos  4.2     10-13  Mg     2.10     10-13    TPro  7.3  /  Alb  4.0  /  TBili  0.5  /  DBili  x   /  AST  24  /  ALT  19  /  AlkPhos  105  10-12               proBNP: Serum Pro-Brain Natriuretic Peptide: 1206 pg/mL (10-10-22 @ 18:50)    < from: CT Angio Chest PE Protocol w/ IV Cont (10.10.22 @ 21:46) >  ACC: [id number] EXAM:  CT ABDOMEN AND PELVIS IC                        ACC: [id number] EXAM:  CT ANGIO CHEST PULM [facility]                          PROCEDURE DATE:  10/10/2022          INTERPRETATION:  CLINICAL INFORMATION: Cough and worsening dyspnea. Rule   out PE    COMPARISON: None available    CONTRAST/COMPLICATIONS:  IV Contrast: Omnipaque 350  90 cc administered   10 cc discarded  Oral Contrast: NONE  Complications: None reported at time of study completion    PROCEDURE:  CT Angiography of the Chest was performed followed by portal venous phase   imaging of the Abdomen and Pelvis.  Sagittal and coronal reformats were performed as well as 3D (MIP)   reconstructions.    FINDINGS:  CHEST:  LUNGS AND LARGE AIRWAYS: Patent central airways. Diffuse bilateral   groundglass opacities and interlobular septal thickening consistent with   pulmonary edema.  PLEURA: No pleural effusion.  VESSELS: Aortic and coronary artery calcifications. No main, right main,   left main, lobar or segmental pulmonary embolism. Limited evaluation of   subsegmental pulmonary arteries secondary to motion and mixing artifact.  HEART: Cardiomegaly. Trace pericardial effusion. Mitral valve annular   calcifications.  MEDIASTINUM AND YAAKOV: Prominent mediastinal lymph nodes measure less than   1.5 cm in short axis diameter.  CHEST WALL AND LOWER NECK: Within normal limits.    ABDOMEN AND PELVIS:  LIVER: Within normal limits.  BILE DUCTS: Normal caliber.  GALLBLADDER: Within normal limits.  SPLEEN: Within normal limits.  PANCREAS: Within normal limits.  ADRENALS: Within normal limits.  KIDNEYS/URETERS: Left renal cysts and additional bilateral hypodensities   too small characterize. No hydronephrosis.    BLADDER: Within normal limits.  REPRODUCTIVE ORGANS: Fibroid uterus. A 4.0 cm right adnexal cyst.    BOWEL: No bowel obstruction. Appendix is normal.  PERITONEUM: No ascites.  VESSELS: Atherosclerotic changes.  RETROPERITONEUM/LYMPH NODES: No lymphadenopathy.  ABDOMINAL WALL: Small fat-containing umbilicalhernia.  BONES: Degenerative changes.    IMPRESSION:  Cardiomegaly and moderate pulmonary edema.    No main, right main, left main, lobar or segmental pulmonary embolism.   Limited evaluation of subsegmental pulmonary arteries secondary to motion   and mixing artifact..        --- End of Report ---          LIZZ NATHAN MD; Resident Radiologist  This document has been electronically signed.  NICHOLAS RITCHIE MD; Attending Radiologist  This document has been electronically signed. Oct 10 2022 10:35PM    < end of copied text >  	  < from: Transthoracic Echocardiogram (10.11.22 @ 07:20) >    Patient name: JONNY WORTHY  YOB: 1950   Age: 71 (F)   MR#: [id number]  Study Date: 10/11/2022  [facility]  Study quality: Technically Difficult  Referring Physician: Keegan Plascencia MD, PhD  Blood Pressure: 134/86 mmHg  Height: 168 cm  Weight: 101 kg  BSA: 2.1 m2  ------------------------------------------------------------------------  PROCEDURE: Transthoracic echocardiogram with 2-D, M-Mode  and complete spectral and color flow Doppler.  INDICATION: Heart failure, unspecified (I50.9)  ------------------------------------------------------------------------  OBSERVATIONS:  Mitral Valve: Mitral annular calcification, otherwise  normal mitral valve. Minimal mitral regurgitation.  Aortic Root: Normal aortic root.  Aortic Valve: Aortic valve leaflet morphology not well  visualized.  Left Atrium: Normal left atrium.  Left Ventricle: Endocardium not well visualized; grossly  normal left ventricular systolic function.  Right Heart: Normalright atrium. The right ventricle is  not well visualized; grossly normal right ventricular  systolic function. Normal tricuspid valve. Minimal  tricuspid regurgitation. Normal pulmonic valve.  Pericardium/PleuraNormal pericardium with no pericardial  effusion.  ------------------------------------------------------------------------  CONCLUSIONS:  1. Mitral annular calcification, otherwise normal mitral  valve. Minimal mitral regurgitation.  2. Endocardium not well visualized; grossly normal left  ventricular systolic function.  3. The right ventricle is not well visualized; grossly  normal right ventricular systolic function.  ------------------------------------------------------------------------  Confirmed on  10/11/2022 - 08:38:01 by Merlin Wilkerson M.D.,  [facility], [place]  ------------------------------------------------------------------------    < end of copied text >

## 2022-10-13 NOTE — PROGRESS NOTE ADULT - ASSESSMENT
70yo Female with MHx aneurysm, HTN, DM Type 2, HLD, h/o Covid-19  (residual symptoms, home oxygen intermittently, NC 2L/min PRN, diagnosed with chronic lung disease 2/2 covid-19) c/o coughing up bright red blood today x 2 episodes. Pt provides a white towel with several bloody stains (2x2cm) from home and a bottle with a blood clot (0.5x2cm in size).  No prior episodes like that in the past. Reports 3 weeks of coughing with worsening SOB. Also reports associated worsening bilateral lower extremity swelling. States has had no palpitations, CP, fevers, Abd pain, n/v/d; Of note, patient was treated with Azithromycin x 5 days by PCP with no relief. Reports no recent travel, no hx of TB, non-smoker. I feel fine today .    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   ·  Plan: -Cardiology and pulmonary  helping.   < from: Transthoracic Echocardiogram (10.11.22 @ 07:20) >  CONCLUSIONS:  1. Mitral annular calcification, otherwise normal mitral  valve. Minimal mitral regurgitation.  2. Endocardium not well visualized; grossly normal left  ventricular systolic function.  3. The right ventricle is not well visualized; grossly  normal right ventricular systolic function.    < end of copied text >       Problem/Plan - 2:  ·  Problem: Hemoptysis.   ·  Plan: S/P  Bronchoscopy .May need ENT evaluation.   CTA chest: No main, right main, left main, lobar or segmental pulmonary embolism. Limited evaluation of subsegmental pulmonary arteries secondary to motion and mixing artifact. Patent central airways. Diffuse bilateral   groundglass opacities and interlobular septal thickening consistent with pulmonary edema. No pleural effusion.  -Hold Heparin for DVT  -Pulmonology following.   -Supp O2 NC  -VS q4h  -Elev HOB.     Problem/Plan - 3:  ·  Problem: Type 2 diabetes mellitus with hyperglycemia, with long-term current use of insulin.   ·  Plan: -Sugars fine.  Conservative insulin dosage: Lantus 40u -->32u in AM, Novolog 25u TID --> 10u TID while inpt  -monitor closely FS (pt states that skips insulin sometimes due to low BG)   -moderate IASS  -a1c  -DM diet.     Problem/Plan - 4:  ·  Problem: Stroke.   ·  Plan: Chronic; On regular consistency diet with thin fluids;  -fall, aspiration precautions.     Problem/Plan - 5:  ·  Problem: DVT prophylaxis.   ·  Plan: No Heparin due to hemoptysis   SCDs.

## 2022-10-13 NOTE — ASU PREOP CHECKLIST - SELECT TESTS ORDERED
FS/CBC/CMP/PT/PTT/INR/Type and Cross/Type and Screen/EKG/POCT Blood Glucose/COVID-19 FS -/CBC/CMP/PT/PTT/INR/Type and Cross/Type and Screen/EKG/POCT Blood Glucose/COVID-19 FS - 116/CBC/CMP/PT/PTT/INR/Type and Cross/Type and Screen/EKG/POCT Blood Glucose/COVID-19

## 2022-10-13 NOTE — PROGRESS NOTE ADULT - SUBJECTIVE AND OBJECTIVE BOX
Date of Service: 10-13-22 @ 10:29    Patient is a 71y old  Female who presents with a chief complaint of Hemoptysis (12 Oct 2022 12:50)      Any change in ROS: She is alert and awake no sob: had dark brown , blood tinged sutunm overnight       MEDICATIONS  (STANDING):  atorvastatin 40 milliGRAM(s) Oral at bedtime  budesonide 160 MICROgram(s)/formoterol 4.5 MICROgram(s) Inhaler 2 Puff(s) Inhalation two times a day  dextrose 5%. 1000 milliLiter(s) (50 mL/Hr) IV Continuous <Continuous>  dextrose 5%. 1000 milliLiter(s) (100 mL/Hr) IV Continuous <Continuous>  dextrose 50% Injectable 25 Gram(s) IV Push once  dextrose 50% Injectable 12.5 Gram(s) IV Push once  dextrose 50% Injectable 25 Gram(s) IV Push once  glucagon  Injectable 1 milliGRAM(s) IntraMuscular once  hydrALAZINE 25 milliGRAM(s) Oral three times a day  influenza  Vaccine (HIGH DOSE) 0.7 milliLiter(s) IntraMuscular once  insulin glargine Injectable (LANTUS) 32 Unit(s) SubCutaneous every morning  insulin lispro (ADMELOG) corrective regimen sliding scale   SubCutaneous three times a day before meals  insulin lispro (ADMELOG) corrective regimen sliding scale   SubCutaneous at bedtime  insulin lispro Injectable (ADMELOG) 10 Unit(s) SubCutaneous three times a day before meals  losartan 100 milliGRAM(s) Oral daily  metoprolol tartrate 25 milliGRAM(s) Oral two times a day  multivitamin 1 Tablet(s) Oral daily    MEDICATIONS  (PRN):  acetaminophen     Tablet .. 650 milliGRAM(s) Oral every 6 hours PRN Temp greater or equal to 38C (100.4F), Mild Pain (1 - 3)  ALBUTerol    90 MICROgram(s) HFA Inhaler 1 Puff(s) Inhalation every 6 hours PRN Shortness of Breath  dextrose Oral Gel 15 Gram(s) Oral once PRN Blood Glucose LESS THAN 70 milliGRAM(s)/deciliter  melatonin 3 milliGRAM(s) Oral at bedtime PRN Insomnia  ondansetron Injectable 4 milliGRAM(s) IV Push every 8 hours PRN Nausea and/or Vomiting    Vital Signs Last 24 Hrs  T(C): 37.1 (13 Oct 2022 09:16), Max: 37.1 (12 Oct 2022 18:09)  T(F): 98.8 (13 Oct 2022 09:16), Max: 98.8 (13 Oct 2022 09:16)  HR: 66 (13 Oct 2022 09:16) (66 - 78)  BP: 135/83 (13 Oct 2022 09:16) (135/83 - 175/100)  BP(mean): --  RR: 18 (13 Oct 2022 09:16) (18 - 19)  SpO2: 97% (13 Oct 2022 09:16) (94% - 100%)    Parameters below as of 13 Oct 2022 09:16  Patient On (Oxygen Delivery Method): nasal cannula  O2 Flow (L/min): 2      I&O's Summary    12 Oct 2022 07:01  -  13 Oct 2022 07:00  --------------------------------------------------------  IN: 115 mL / OUT: 800 mL / NET: -685 mL          Physical Exam:   GENERAL: NAD, well-groomed, well-developed  HEENT: NORMA/   Atraumatic, Normocephalic  ENMT: No tonsillar erythema, exudates, or enlargement; Moist mucous membranes, Good dentition, No lesions  NECK: Supple, No JVD, Normal thyroid  CHEST/LUNG: Clear to auscultaion,  CVS: Regular rate and rhythm; No murmurs, rubs, or gallops  GI: : Soft, Nontender, Nondistended; Bowel sounds present  NERVOUS SYSTEM:  Alert & Oriented X3  EXTREMITIES:  -edema  LYMPH: No lymphadenopathy noted  SKIN: No rashes or lesions  ENDOCRINOLOGY: No Thyromegaly  PSYCH: Appropriate    Labs:  32                            12.0   8.54  )-----------( 291      ( 13 Oct 2022 06:00 )             39.0                         12.9   12.20 )-----------( 320      ( 12 Oct 2022 05:45 )             42.6                         11.8   11.03 )-----------( 261      ( 11 Oct 2022 07:14 )             37.9                         11.7   8.17  )-----------( 262      ( 10 Oct 2022 18:50 )             38.3     10-13    140  |  98  |  13  ----------------------------<  111<H>  3.7   |  30  |  0.81  10-12    140  |  98  |  14  ----------------------------<  111<H>  3.9   |  32<H>  |  0.92  10-11    138  |  97<L>  |  12  ----------------------------<  154<H>  3.3<L>   |  30  |  0.79  10-10    140  |  101  |  14  ----------------------------<  184<H>  3.5   |  29  |  0.87    Ca    9.0      13 Oct 2022 06:00  Ca    9.1      12 Oct 2022 05:45  Phos  4.2     10-13  Phos  4.2     10-12  Mg     2.10     10-13  Mg     1.90     10-12    TPro  7.3  /  Alb  4.0  /  TBili  0.5  /  DBili  x   /  AST  24  /  ALT  19  /  AlkPhos  105  10-12  TPro  6.6  /  Alb  3.8  /  TBili  0.3  /  DBili  x   /  AST  20  /  ALT  19  /  AlkPhos  97  10-10    CAPILLARY BLOOD GLUCOSE      POCT Blood Glucose.: 133 mg/dL (13 Oct 2022 07:38)  POCT Blood Glucose.: 123 mg/dL (13 Oct 2022 06:21)  POCT Blood Glucose.: 192 mg/dL (12 Oct 2022 21:27)  POCT Blood Glucose.: 190 mg/dL (12 Oct 2022 17:00)  POCT Blood Glucose.: 120 mg/dL (12 Oct 2022 11:47)      LIVER FUNCTIONS - ( 12 Oct 2022 05:45 )  Alb: 4.0 g/dL / Pro: 7.3 g/dL / ALK PHOS: 105 U/L / ALT: 19 U/L / AST: 24 U/L / GGT: x           PT/INR - ( 13 Oct 2022 06:00 )   PT: 11.5 sec;   INR: 0.99 ratio         PTT - ( 13 Oct 2022 06:00 )  PTT:29.7 sec    Serum Pro-Brain Natriuretic Peptide: 1206 pg/mL (10-10 @ 18:50)        RECENT CULTURES:  < from: CT Angio Chest PE Protocol w/ IV Cont (10.10.22 @ 21:46) >    ABDOMEN AND PELVIS:  LIVER: Within normal limits.  BILE DUCTS: Normal caliber.  GALLBLADDER: Within normal limits.  SPLEEN: Within normal limits.  PANCREAS: Within normal limits.  ADRENALS: Within normal limits.  KIDNEYS/URETERS: Left renal cysts and additional bilateral hypodensities   too small characterize. No hydronephrosis.    BLADDER: Within normal limits.  REPRODUCTIVE ORGANS: Fibroid uterus. A 4.0 cm right adnexal cyst.    BOWEL: No bowel obstruction. Appendix is normal.  PERITONEUM: No ascites.  VESSELS: Atherosclerotic changes.  RETROPERITONEUM/LYMPH NODES: No lymphadenopathy.  ABDOMINAL WALL: Small fat-containing umbilicalhernia.  BONES: Degenerative changes.    IMPRESSION:  Cardiomegaly and moderate pulmonary edema.    No main, right main, left main, lobar or segmental pulmonary embolism.   Limited evaluation of subsegmental pulmonary arteries secondary to motion   and mixing artifact..        --- End of Report ---          LIZZ NATHAN MD; Resident Radiologist  This document has been electronically signed.  NICHOLAS RITCHIE MD; Attending Radiologist  This document has been electronically signed. Oct 10 2022 10:35PM    < end of copied text >  < from: CT Abdomen and Pelvis w/ IV Cont (10.10.22 @ 21:45) >  SPLEEN: Within normal limits.  PANCREAS: Within normal limits.  ADRENALS: Within normal limits.  KIDNEYS/URETERS: Left renal cysts and additional bilateral hypodensities   too small characterize. No hydronephrosis.    BLADDER: Within normal limits.  REPRODUCTIVE ORGANS: Fibroid uterus. A 4.0 cm right adnexal cyst.    BOWEL: No bowel obstruction. Appendix is normal.  PERITONEUM: No ascites.  VESSELS: Atherosclerotic changes.  RETROPERITONEUM/LYMPH NODES: No lymphadenopathy.  ABDOMINAL WALL: Small fat-containing umbilicalhernia.  BONES: Degenerative changes.    IMPRESSION:  Cardiomegaly and moderate pulmonary edema.    No main, right main, left main, lobar or segmental pulmonary embolism.   Limited evaluation of subsegmental pulmonary arteries secondary to motion   and mixing artifact..        --- End of Report ---          LIZZ NATHAN MD; Resident Radiologist  This document has been electronically signed.  NICHOLAS RITCHIE MD; Attending Radiologist  This document has been electronically signed. Oct 10 2022 10:35PM    < end of copied text >        RESPIRATORY CULTURES:          Studies  Chest X-RAY  CT SCAN Chest   Venous Dopplers: LE:   CT Abdomen  Others

## 2022-10-13 NOTE — PROGRESS NOTE ADULT - SUBJECTIVE AND OBJECTIVE BOX
Date of Service  : 10-13-22     INTERVAL HPI/OVERNIGHT EVENTS: S/P Bronchoscopy .   Vital Signs Last 24 Hrs  T(C): 37.1 (13 Oct 2022 21:11), Max: 37.1 (13 Oct 2022 09:16)  T(F): 98.8 (13 Oct 2022 21:11), Max: 98.8 (13 Oct 2022 09:16)  HR: 109 (13 Oct 2022 21:11) (65 - 109)  BP: 151/95 (13 Oct 2022 21:11) (125/90 - 175/100)  BP(mean): 87 (13 Oct 2022 16:00) (78 - 99)  RR: 18 (13 Oct 2022 21:11) (12 - 24)  SpO2: 99% (13 Oct 2022 21:11) (95% - 100%)    Parameters below as of 13 Oct 2022 21:11  Patient On (Oxygen Delivery Method): nasal cannula  O2 Flow (L/min): 2    I&O's Summary    12 Oct 2022 07:01  -  13 Oct 2022 07:00  --------------------------------------------------------  IN: 115 mL / OUT: 800 mL / NET: -685 mL    13 Oct 2022 07:01  -  13 Oct 2022 22:23  --------------------------------------------------------  IN: 1050 mL / OUT: 400 mL / NET: 650 mL      MEDICATIONS  (STANDING):  atorvastatin 40 milliGRAM(s) Oral at bedtime  budesonide 160 MICROgram(s)/formoterol 4.5 MICROgram(s) Inhaler 2 Puff(s) Inhalation two times a day  dextrose 5%. 1000 milliLiter(s) (50 mL/Hr) IV Continuous <Continuous>  dextrose 5%. 1000 milliLiter(s) (100 mL/Hr) IV Continuous <Continuous>  dextrose 50% Injectable 25 Gram(s) IV Push once  dextrose 50% Injectable 12.5 Gram(s) IV Push once  dextrose 50% Injectable 25 Gram(s) IV Push once  glucagon  Injectable 1 milliGRAM(s) IntraMuscular once  hydrALAZINE 25 milliGRAM(s) Oral three times a day  influenza  Vaccine (HIGH DOSE) 0.7 milliLiter(s) IntraMuscular once  insulin glargine Injectable (LANTUS) 32 Unit(s) SubCutaneous every morning  insulin lispro (ADMELOG) corrective regimen sliding scale   SubCutaneous three times a day before meals  insulin lispro (ADMELOG) corrective regimen sliding scale   SubCutaneous at bedtime  insulin lispro Injectable (ADMELOG) 10 Unit(s) SubCutaneous three times a day before meals  losartan 100 milliGRAM(s) Oral daily  metoprolol tartrate 25 milliGRAM(s) Oral two times a day  multivitamin 1 Tablet(s) Oral daily    MEDICATIONS  (PRN):  acetaminophen     Tablet .. 650 milliGRAM(s) Oral every 6 hours PRN Temp greater or equal to 38C (100.4F), Mild Pain (1 - 3)  ALBUTerol    90 MICROgram(s) HFA Inhaler 1 Puff(s) Inhalation every 6 hours PRN Shortness of Breath  dextrose Oral Gel 15 Gram(s) Oral once PRN Blood Glucose LESS THAN 70 milliGRAM(s)/deciliter  melatonin 3 milliGRAM(s) Oral at bedtime PRN Insomnia  ondansetron Injectable 4 milliGRAM(s) IV Push every 8 hours PRN Nausea and/or Vomiting    LABS:                        12.0   8.54  )-----------( 291      ( 13 Oct 2022 06:00 )             39.0     10-13    140  |  98  |  13  ----------------------------<  111<H>  3.7   |  30  |  0.81    Ca    9.0      13 Oct 2022 06:00  Phos  4.2     10-13  Mg     2.10     10-13    TPro  7.3  /  Alb  4.0  /  TBili  0.5  /  DBili  x   /  AST  24  /  ALT  19  /  AlkPhos  105  10-12    PT/INR - ( 13 Oct 2022 06:00 )   PT: 11.5 sec;   INR: 0.99 ratio         PTT - ( 13 Oct 2022 06:00 )  PTT:29.7 sec    CAPILLARY BLOOD GLUCOSE      POCT Blood Glucose.: 145 mg/dL (13 Oct 2022 20:57)  POCT Blood Glucose.: 146 mg/dL (13 Oct 2022 16:59)  POCT Blood Glucose.: 136 mg/dL (13 Oct 2022 16:12)  POCT Blood Glucose.: 120 mg/dL (13 Oct 2022 14:02)  POCT Blood Glucose.: 116 mg/dL (13 Oct 2022 11:35)  POCT Blood Glucose.: 133 mg/dL (13 Oct 2022 07:38)  POCT Blood Glucose.: 123 mg/dL (13 Oct 2022 06:21)          REVIEW OF SYSTEMS:  CONSTITUTIONAL: No fever, weight loss, or fatigue  EYES: No eye pain, visual disturbances, or discharge  ENMT:  No difficulty hearing, tinnitus, vertigo; No sinus or throat pain  NECK: No pain or stiffness  RESPIRATORY: No cough, wheezing, chills or hemoptysis; No shortness of breath  CARDIOVASCULAR: No chest pain, palpitations, dizziness, or leg swelling  GASTROINTESTINAL: No abdominal or epigastric pain. No nausea, vomiting, or hematemesis; No diarrhea or constipation. No melena or hematochezia.  GENITOURINARY: No dysuria, frequency, hematuria, or incontinence  NEUROLOGICAL: No headaches, memory loss, loss of strength, numbness, or tremors    Consultant(s) Notes Reviewed:  [x ] YES  [ ] NO    PHYSICAL EXAM:  GENERAL: NAD, well-groomed, well-developed,not in any distress ,  HEAD:  Atraumatic, Normocephalic  NECK: Supple, No JVD, Normal thyroid  NERVOUS SYSTEM:  Alert & Oriented X3, No focal deficit   CHEST/LUNG: Good air entry bilateral with no  rales, rhonchi, wheezing, or rubs  HEART: Regular rate and rhythm; No murmurs, rubs, or gallops  ABDOMEN: Soft, Nontender, Nondistended; Bowel sounds present  EXTREMITIES:  2+ Peripheral Pulses, No clubbing, cyanosis, or edema    Care Discussed with Consultants/Other Providers [ x] YES  [ ] NO

## 2022-10-13 NOTE — PROGRESS NOTE ADULT - SUBJECTIVE AND OBJECTIVE BOX
Pt is without complaints after her procedure today.  She has not had any further hemoptysis.  She denies any breathing issues.  VSS   Pt is awake and alert in NAD  Lungs; Unlabored breathing  A: stable s/p Flexible Bronchoscopy  P: as per primary team

## 2022-10-13 NOTE — PROGRESS NOTE ADULT - ASSESSMENT
Patient is a 70 yo woman with history of an aneurysm, HTN, DM Type 2, HLD, h/o Covid-19  (residual symptoms, home oxygen intermittently, NC 2L/min PRN, diagnosed with chronic lung disease 2/2 covid-19) c/o coughing up bright red blood today x 2 episodes.     Patient provides a white towel with several bloody stains (2x2cm) from home and a bottle with a blood clot (0.5x2cm in size).  No prior episodes like that in the past.     From the cardiopulmonary perspective, she also reports having three weeks of progressive coughing with worsening SOB.   Also reports associated worsening bilateral lower extremity swelling. States has had no palpitations, CP, fevers, Abd pain, n/v/d; Of note, patient was treated with Azithromycin x 5 days by PCP with no relief. Reports no recent travel, no hx of TB, non-smoker. (11 Oct 2022 04:44)    CTA chest:  --No central PE  --Cardiomegaly and moderate pulmonary edema.    When evaluated this AM, patient appeared clinically and hemodynamically stable.  On exam, coarse breath sounds noted, +LE edema bilaterally.    Impression:  --Unclear etiology for current symptoms  --Echocardiogram reviewed -- normal biventricular systolic function  --Thoracic/Pulmonary team suspects component of air trapping -- recommending bronchoscopy on 10/13 for further evaluation.  --ENT evaluation recommended for further evaluation of hemoptysis.  --Stop diuretics  --No further cardiac testing needed at this time.  --From cardiac perspective, patient may proceed with bronchoscopy without the need for further cardiac testing or risk stratification.   Patient is a 70 yo woman with history of an aneurysm, HTN, DM Type 2, HLD, h/o Covid-19  (residual symptoms, home oxygen intermittently, NC 2L/min PRN, diagnosed with chronic lung disease 2/2 covid-19) c/o coughing up bright red blood today x 2 episodes.     Patient provides a white towel with several bloody stains (2x2cm) from home and a bottle with a blood clot (0.5x2cm in size).  No prior episodes like that in the past.     From the cardiopulmonary perspective, she also reports having three weeks of progressive coughing with worsening SOB.   Also reports associated worsening bilateral lower extremity swelling. States has had no palpitations, CP, fevers, Abd pain, n/v/d; Of note, patient was treated with Azithromycin x 5 days by PCP with no relief. Reports no recent travel, no hx of TB, non-smoker. (11 Oct 2022 04:44)    CTA chest:  --No central PE  --Cardiomegaly and moderate pulmonary edema.    When evaluated this AM, patient appeared clinically and hemodynamically stable.  On exam, coarse breath sounds noted, +LE edema bilaterally.    Impression:  --Unclear etiology for current symptoms  --Echocardiogram reviewed -- normal biventricular systolic function  --Plan for bronchoscopy today for recent history of hemoptysis.  --No further cardiac testing needed at this time.  --From cardiac perspective, patient may proceed with bronchoscopy without the need for further cardiac testing or risk stratification.

## 2022-10-14 LAB
ANION GAP SERPL CALC-SCNC: 8 MMOL/L — SIGNIFICANT CHANGE UP (ref 7–14)
BUN SERPL-MCNC: 15 MG/DL — SIGNIFICANT CHANGE UP (ref 7–23)
CALCIUM SERPL-MCNC: 9 MG/DL — SIGNIFICANT CHANGE UP (ref 8.4–10.5)
CHLORIDE SERPL-SCNC: 96 MMOL/L — LOW (ref 98–107)
CO2 SERPL-SCNC: 32 MMOL/L — HIGH (ref 22–31)
CREAT SERPL-MCNC: 1.06 MG/DL — SIGNIFICANT CHANGE UP (ref 0.5–1.3)
EGFR: 56 ML/MIN/1.73M2 — LOW
GLUCOSE BLDC GLUCOMTR-MCNC: 131 MG/DL — HIGH (ref 70–99)
GLUCOSE BLDC GLUCOMTR-MCNC: 144 MG/DL — HIGH (ref 70–99)
GLUCOSE BLDC GLUCOMTR-MCNC: 150 MG/DL — HIGH (ref 70–99)
GLUCOSE BLDC GLUCOMTR-MCNC: 215 MG/DL — HIGH (ref 70–99)
GLUCOSE SERPL-MCNC: 180 MG/DL — HIGH (ref 70–99)
HCT VFR BLD CALC: 38.8 % — SIGNIFICANT CHANGE UP (ref 34.5–45)
HGB BLD-MCNC: 11.7 G/DL — SIGNIFICANT CHANGE UP (ref 11.5–15.5)
MAGNESIUM SERPL-MCNC: 2.3 MG/DL — SIGNIFICANT CHANGE UP (ref 1.6–2.6)
MCHC RBC-ENTMCNC: 27.3 PG — SIGNIFICANT CHANGE UP (ref 27–34)
MCHC RBC-ENTMCNC: 30.2 GM/DL — LOW (ref 32–36)
MCV RBC AUTO: 90.4 FL — SIGNIFICANT CHANGE UP (ref 80–100)
NRBC # BLD: 0 /100 WBCS — SIGNIFICANT CHANGE UP (ref 0–0)
NRBC # FLD: 0 K/UL — SIGNIFICANT CHANGE UP (ref 0–0)
PHOSPHATE SERPL-MCNC: 5.4 MG/DL — HIGH (ref 2.5–4.5)
PLATELET # BLD AUTO: 282 K/UL — SIGNIFICANT CHANGE UP (ref 150–400)
POTASSIUM SERPL-MCNC: 4.6 MMOL/L — SIGNIFICANT CHANGE UP (ref 3.5–5.3)
POTASSIUM SERPL-SCNC: 4.6 MMOL/L — SIGNIFICANT CHANGE UP (ref 3.5–5.3)
RBC # BLD: 4.29 M/UL — SIGNIFICANT CHANGE UP (ref 3.8–5.2)
RBC # FLD: 14.9 % — HIGH (ref 10.3–14.5)
SODIUM SERPL-SCNC: 136 MMOL/L — SIGNIFICANT CHANGE UP (ref 135–145)
WBC # BLD: 9.49 K/UL — SIGNIFICANT CHANGE UP (ref 3.8–10.5)
WBC # FLD AUTO: 9.49 K/UL — SIGNIFICANT CHANGE UP (ref 3.8–10.5)

## 2022-10-14 PROCEDURE — 71045 X-RAY EXAM CHEST 1 VIEW: CPT | Mod: 26,77

## 2022-10-14 PROCEDURE — 71045 X-RAY EXAM CHEST 1 VIEW: CPT | Mod: 26

## 2022-10-14 PROCEDURE — 99233 SBSQ HOSP IP/OBS HIGH 50: CPT

## 2022-10-14 RX ORDER — DIPHENHYDRAMINE HCL 50 MG
25 CAPSULE ORAL ONCE
Refills: 0 | Status: COMPLETED | OUTPATIENT
Start: 2022-10-14 | End: 2022-10-14

## 2022-10-14 RX ORDER — CYCLOBENZAPRINE HYDROCHLORIDE 10 MG/1
5 TABLET, FILM COATED ORAL ONCE
Refills: 0 | Status: COMPLETED | OUTPATIENT
Start: 2022-10-14 | End: 2022-10-14

## 2022-10-14 RX ORDER — ACETAMINOPHEN 500 MG
1000 TABLET ORAL ONCE
Refills: 0 | Status: COMPLETED | OUTPATIENT
Start: 2022-10-14 | End: 2022-10-14

## 2022-10-14 RX ORDER — DIVALPROEX SODIUM 500 MG/1
250 TABLET, DELAYED RELEASE ORAL
Refills: 0 | Status: COMPLETED | OUTPATIENT
Start: 2022-10-14 | End: 2022-10-15

## 2022-10-14 RX ORDER — KETOROLAC TROMETHAMINE 30 MG/ML
15 SYRINGE (ML) INJECTION ONCE
Refills: 0 | Status: DISCONTINUED | OUTPATIENT
Start: 2022-10-14 | End: 2022-10-14

## 2022-10-14 RX ORDER — METOCLOPRAMIDE HCL 10 MG
10 TABLET ORAL THREE TIMES A DAY
Refills: 0 | Status: COMPLETED | OUTPATIENT
Start: 2022-10-14 | End: 2022-10-16

## 2022-10-14 RX ORDER — CYCLOBENZAPRINE HYDROCHLORIDE 10 MG/1
5 TABLET, FILM COATED ORAL THREE TIMES A DAY
Refills: 0 | Status: DISCONTINUED | OUTPATIENT
Start: 2022-10-14 | End: 2022-10-18

## 2022-10-14 RX ORDER — DIPHENHYDRAMINE HCL 50 MG
25 CAPSULE ORAL ONCE
Refills: 0 | Status: DISCONTINUED | OUTPATIENT
Start: 2022-10-14 | End: 2022-10-14

## 2022-10-14 RX ADMIN — ATORVASTATIN CALCIUM 40 MILLIGRAM(S): 80 TABLET, FILM COATED ORAL at 23:00

## 2022-10-14 RX ADMIN — Medication 25 MILLIGRAM(S): at 06:02

## 2022-10-14 RX ADMIN — Medication 1000 MILLIGRAM(S): at 22:59

## 2022-10-14 RX ADMIN — Medication 15 MILLIGRAM(S): at 13:15

## 2022-10-14 RX ADMIN — Medication 25 MILLIGRAM(S): at 18:17

## 2022-10-14 RX ADMIN — Medication 25 MILLIGRAM(S): at 18:18

## 2022-10-14 RX ADMIN — Medication 4: at 17:29

## 2022-10-14 RX ADMIN — Medication 10 MILLIGRAM(S): at 18:19

## 2022-10-14 RX ADMIN — Medication 10 UNIT(S): at 12:46

## 2022-10-14 RX ADMIN — CYCLOBENZAPRINE HYDROCHLORIDE 5 MILLIGRAM(S): 10 TABLET, FILM COATED ORAL at 18:17

## 2022-10-14 RX ADMIN — BUDESONIDE AND FORMOTEROL FUMARATE DIHYDRATE 2 PUFF(S): 160; 4.5 AEROSOL RESPIRATORY (INHALATION) at 23:00

## 2022-10-14 RX ADMIN — Medication 15 MILLIGRAM(S): at 12:47

## 2022-10-14 RX ADMIN — Medication 1 TABLET(S): at 12:49

## 2022-10-14 RX ADMIN — LOSARTAN POTASSIUM 100 MILLIGRAM(S): 100 TABLET, FILM COATED ORAL at 06:02

## 2022-10-14 RX ADMIN — Medication 3 MILLIGRAM(S): at 23:04

## 2022-10-14 RX ADMIN — DIVALPROEX SODIUM 250 MILLIGRAM(S): 500 TABLET, DELAYED RELEASE ORAL at 18:17

## 2022-10-14 RX ADMIN — Medication 25 MILLIGRAM(S): at 23:00

## 2022-10-14 RX ADMIN — INSULIN GLARGINE 32 UNIT(S): 100 INJECTION, SOLUTION SUBCUTANEOUS at 08:00

## 2022-10-14 RX ADMIN — BUDESONIDE AND FORMOTEROL FUMARATE DIHYDRATE 2 PUFF(S): 160; 4.5 AEROSOL RESPIRATORY (INHALATION) at 12:47

## 2022-10-14 RX ADMIN — Medication 10 UNIT(S): at 08:01

## 2022-10-14 RX ADMIN — Medication 650 MILLIGRAM(S): at 10:45

## 2022-10-14 RX ADMIN — Medication 10 UNIT(S): at 17:28

## 2022-10-14 RX ADMIN — Medication 650 MILLIGRAM(S): at 09:45

## 2022-10-14 RX ADMIN — Medication 25 MILLIGRAM(S): at 06:01

## 2022-10-14 NOTE — PROGRESS NOTE ADULT - ASSESSMENT
Patient is a 70 yo woman with history of an aneurysm, HTN, DM Type 2, HLD, h/o Covid-19  (residual symptoms, home oxygen intermittently, NC 2L/min PRN, diagnosed with chronic lung disease 2/2 covid-19) c/o coughing up bright red blood today x 2 episodes.     Patient provides a white towel with several bloody stains (2x2cm) from home and a bottle with a blood clot (0.5x2cm in size).  No prior episodes like that in the past.     From the cardiopulmonary perspective, she also reports having three weeks of progressive coughing with worsening SOB.   Also reports associated worsening bilateral lower extremity swelling. States has had no palpitations, CP, fevers, Abd pain, n/v/d; Of note, patient was treated with Azithromycin x 5 days by PCP with no relief. Reports no recent travel, no hx of TB, non-smoker. (11 Oct 2022 04:44)    CTA chest:  --No central PE  --Cardiomegaly and moderate pulmonary edema.    When evaluated this AM, patient appeared clinically and hemodynamically stable.  On exam, coarse breath sounds noted, +LE edema bilaterally.    --Unclear etiology for current symptoms  --Echocardiogram reviewed -- normal biventricular systolic function  --No findings of bleeding noted on bronchoscopy  --No further cardiac testing needed at this time.  --Will defer to Medicine re: appropriate imaging of neck (site of discomfort)

## 2022-10-14 NOTE — CONSULT NOTE ADULT - SUBJECTIVE AND OBJECTIVE BOX
[facility]), [facility]        Patient is a 71y old  Female who presents with a chief complaint of Hemoptysis (15 Oct 2022 15:07)    Excerpt from H&P,"     70yo Female with MHx aneurysm, HTN, DM Type 2, HLD, h/o Covid-19  (residual symptoms, home oxygen intermittently, NC 2L/min PRN, diagnosed with chronic lung disease 2/2 covid-19) c/o coughing up bright red blood today x 2 episodes. Pt provides a white towel with several bloody stains (2x2cm) from home and a bottle with a blood clot (0.5x2cm in size).  No prior episodes like that in the past. Reports 3 weeks of coughing with worsening SOB. Also reports associated worsening bilateral lower extremity swelling. States has had no palpitations, CP, fevers, Abd pain, n/v/d; Of note, patient was treated with Azithromycin x 5 days by PCP with no relief. Reports no recent travel, no hx of TB, non-smoker. (11 Oct 2022 04:44)  denies any nausea vomiting   light sensitivity            *****PAST MEDICAL / Surgical  HISTORY:  PAST MEDICAL & SURGICAL HISTORY:  Diabetes mellitus      Hypertension      COVID-19      No significant past surgical history               *****FAMILY HISTORY:  FAMILY HISTORY:  FH: lung cancer (Father)  smoker    FH: diabetes mellitus (Mother, Sibling, Grandparent)             *****SOCIAL HISTORY:  Alcohol: None  Smoking: None         *****ALLERGIES:   Allergies    penicillin (Unknown)    Intolerances             *****MEDICATIONS: current medication reviewed and documented.   MEDICATIONS  (STANDING):  atorvastatin 40 milliGRAM(s) Oral at bedtime  budesonide 160 MICROgram(s)/formoterol 4.5 MICROgram(s) Inhaler 2 Puff(s) Inhalation two times a day  dextrose 5%. 1000 milliLiter(s) (50 mL/Hr) IV Continuous <Continuous>  dextrose 5%. 1000 milliLiter(s) (100 mL/Hr) IV Continuous <Continuous>  dextrose 50% Injectable 25 Gram(s) IV Push once  dextrose 50% Injectable 12.5 Gram(s) IV Push once  dextrose 50% Injectable 25 Gram(s) IV Push once  diVALproex  milliGRAM(s) Oral <User Schedule>  glucagon  Injectable 1 milliGRAM(s) IntraMuscular once  heparin   Injectable 5000 Unit(s) SubCutaneous every 12 hours  hydrALAZINE 25 milliGRAM(s) Oral three times a day  influenza  Vaccine (HIGH DOSE) 0.7 milliLiter(s) IntraMuscular once  insulin glargine Injectable (LANTUS) 32 Unit(s) SubCutaneous every morning  insulin lispro (ADMELOG) corrective regimen sliding scale   SubCutaneous three times a day before meals  insulin lispro (ADMELOG) corrective regimen sliding scale   SubCutaneous at bedtime  insulin lispro Injectable (ADMELOG) 10 Unit(s) SubCutaneous three times a day before meals  losartan 100 milliGRAM(s) Oral daily  metoclopramide Injectable 10 milliGRAM(s) IV Push three times a day  metoprolol tartrate 25 milliGRAM(s) Oral two times a day  multivitamin 1 Tablet(s) Oral daily    MEDICATIONS  (PRN):  acetaminophen     Tablet .. 650 milliGRAM(s) Oral every 6 hours PRN Temp greater or equal to 38C (100.4F), Mild Pain (1 - 3)  ALBUTerol    90 MICROgram(s) HFA Inhaler 1 Puff(s) Inhalation every 6 hours PRN Shortness of Breath  cyclobenzaprine 5 milliGRAM(s) Oral three times a day PRN Muscle Spasm  dextrose Oral Gel 15 Gram(s) Oral once PRN Blood Glucose LESS THAN 70 milliGRAM(s)/deciliter  melatonin 3 milliGRAM(s) Oral at bedtime PRN Insomnia  ondansetron Injectable 4 milliGRAM(s) IV Push every 8 hours PRN Nausea and/or Vomiting           *****REVIEW OF SYSTEM:  GEN: no fever, no chills, no pain  RESP: no SOB, no cough, no sputum  CVS: no chest pain, no palpitations, no edema  GI: no abdominal pain, no nausea, no vomiting, no constipation, no diarrhea  : no dysurea, no frequency, no hematurea  Neuro: no headache, no dizziness  PSYCH: no anxiety, no depression  Derm : no itching, no rash         *****VITAL SIGNS:  T(C): 36.8 (10-15-22 @ 12:13), Max: 39 (10-14-22 @ 22:35)  HR: 88 (10-15-22 @ 12:13) (79 - 98)  BP: 159/96 (10-15-22 @ 12:13) (125/70 - 178/96)  RR: 18 (10-15-22 @ 12:13) (18 - 20)  SpO2: 96% (10-15-22 @ 12:13) (91% - 98%)  Wt(kg): --    10-14 @ 07:01  -  10-15 @ 07:00  --------------------------------------------------------  IN: 200 mL / OUT: 1500 mL / NET: -1300 mL             *****PHYSICAL EXAM:   Alert oriented x 3   Attention comprehension are fair. Able to name, repeat, read without any difficulty.   Able to follow 3 step commands.     EOMI fundi not visualized,  VFF to confrontration  No facial asymmetry   Tongue is midline   Palate elevates symmetrically   Moving all 4 ext symmetrically no pronator drift   Reflexes are symmetric throughout   sensation is grossly symmetric  Gait : not assessed.  B/L down going toes               *****LAB AND IMAGIN.2   9.54  )-----------( 265      ( 15 Oct 2022 06:42 )             40.1               10-15    134<L>  |  95<L>  |  15  ----------------------------<  124<H>  4.2   |  30  |  0.94    Ca    8.7      15 Oct 2022 06:42  Phos  3.8     10-15  Mg     2.10     10-15                              Urinalysis Basic - ( 15 Oct 2022 05:00 )    Color: Light Yellow / Appearance: Clear / S.009 / pH: x  Gluc: x / Ketone: Negative  / Bili: Negative / Urobili: <2 mg/dL   Blood: x / Protein: Trace / Nitrite: Negative   Leuk Esterase: Large / RBC: 0 /HPF / WBC 69 /HPF   Sq Epi: x / Non Sq Epi: 1 /HPF / Bacteria: Few        [All pertinent recent Imaging reports reviewed]         *****A S S E S S M E N T   A N D   P L A N :   Excerpt from H&P,"     70yo Female with MHx aneurysm, HTN, DM Type 2, HLD, h/o Covid-19  (residual symptoms, home oxygen intermittently, NC 2L/min PRN, diagnosed with chronic lung disease 2/2 covid-19) c/o coughing up bright red blood today x 2 episodes. Pt provides a white towel with several bloody stains (2x2cm) from home and a bottle with a blood clot (0.5x2cm in size).  No prior episodes like that in the past. Reports 3 weeks of coughing with worsening SOB. Also reports associated worsening bilateral lower extremity swelling. States has had no palpitations, CP, fevers, Abd pain, n/v/d; Of note, patient was treated with Azithromycin x 5 days by PCP with no relief. Reports no recent travel, no hx of TB, non-smoker. (11 Oct 2022 04:44)       Problem/Recommendations 1:  headache   bp goal normotensive gradually   hx of aneurysm   reglan iv   depakote for headache     Problem/Recommendations 2: coughing   now with hemoptysis   defer to primary team      ___________________________  Will follow with you.  Thank you,  Angelica Jewell MD  Diplomate of the American Board of Neurology and Psychiatry.  Diplomate of the American Board of Vascular Neurology.   [facility]   Ph: [phone]    Differential diagnosis and plan of care discussed with patient after the evaluation.   Advanced care planning options discussed.   Pain assessed and judicious use of narcotics when appropriate was discussed.  Importance of Fall prevention discussed.  Counseling on Smoking and Alcohol cessation was offered when appropriate.  Counseling on Diet, exercise, and medication compliance was done.   83 minutes spent on the total encounter;  more than 50 % of the visit was spent on counseling  and or coordinating care by the attending physician.    Thank you for allowing me to participate in the care of this ollie patient. Please do not hesitate to call me if you have any questions.     This and subsequent notes  will  inherently be subject to errors including those of syntax and substitutions which may escape proofreading. In such instances original meaning may be extrapolated by contextual derivation.

## 2022-10-14 NOTE — PHYSICAL THERAPY INITIAL EVALUATION ADULT - PERTINENT HX OF CURRENT PROBLEM, REHAB EVAL
71 year old female with MHx aneurysm, HTN, DM Type 2, HLD, h/o Covid-19  (residual symptoms, home oxygen intermittently, NC 2L/min PRN, diagnosed with chronic lung disease 2/2 covid-19) c/o coughing up bright red blood today x 2 episodes. CTA chest no central PE.

## 2022-10-14 NOTE — PHYSICAL THERAPY INITIAL EVALUATION ADULT - PRECAUTIONS/LIMITATIONS, REHAB EVAL
elevate head of bed 30 degrees; 5L/min via NC/aspiration precautions/fall precautions/oxygen therapy device and L/min

## 2022-10-14 NOTE — CHART NOTE - NSCHARTNOTEFT_GEN_A_CORE
CC: chills    HPI:     REVIEW OF SYSTEMS:  Constitutional Symptoms: No weakness, fevers, chills  Eyes: No visual changes, headache, eye pain, double vision  Ears, Nose, Mouth, Throat: No runny nose, sinus pain, ear pain, tinnitus, sore throat, dysphagia, odynophagia  Cardiovascular: No chest pain, palpitations, edema  Respiratory: No cough, wheezing, hemoptysis, shortness of breath  Gastrointestinal: No abdominal pain, dysphagia, anorexia, N/V/D/C, hematemesis, BRBPR, melena  Genitourinary: No dysuria, frequency, hematuria  Musculoskeletal: No joint pain, joint swelling, decreased ROM or strength  Skin: No pruritus, rashes, lesions, wounds  Neurologic:  No seizures, headache, paraesthesia, numbness, limb weakness  Psychiatric: No depression, anxiety, difficulty concentrating, anhedonia, lack of energy  Endocrine: No heat/cold intolerance, mood swings, sweats, polydipsia, polyuria  Hematologic/lymphatic: No purpura, petechia, prolonged or excessive bleeding after dental extraction / injury, use of anticoagulant and antiplatelet drugs (including aspirin)  Allergic/Immunologic: No anaphylaxis, allergic response to materials, foods, animals    Positives and pertinent negatives noted and all other systems negative.    VITAL SIGNS:  T(C): 37 (10-14-22 @ 21:33), Max: 37.2 (10-14-22 @ 05:55)  HR: 89 (10-14-22 @ 21:33) (79 - 89)  BP: 168/104 (10-14-22 @ 21:33) (120/69 - 168/104)  RR: 18 (10-14-22 @ 21:33) (16 - 18)  SpO2: 91% (10-14-22 @ 21:33) (91% - 100%)      PHYSICAL EXAM:  GENERAL: No acute distress, well-developed  HEAD:  Atraumatic, Normocephalic  EYES: EOMI, PERRLA, conjunctiva and sclera clear  NECK: Supple, no lymphadenopathy, no JVD  CHEST/LUNG: CTAB; No wheezes, rales, or rhonchi  HEART: RRR; No murmurs, rubs, or gallops  ABDOMEN: Soft, non-tender, non-distended; normal bowel sounds, no organomegaly  EXTREMITIES:  2+ peripheral pulses b/l, No clubbing, cyanosis, or edema  NEUROLOGY: AAO x 4, no focal deficits. CN II-XII intact. Reflexes and sensation present and intact.   SKIN: No rashes or lesions      LABS/IMAGING:        ASSESSMENT/PLAN:     1. Febrile  - CBC, Procal, Lactate, BCx, UA, CXR, RVP          Rosa De Anda PA-C  Pager [phone] CC: chills    HPI: 70yo Female with MHx aneurysm, HTN, DM Type 2, HLD, h/o Covid-19  (residual symptoms, home oxygen intermittently, NC 2L/min PRN, diagnosed with chronic lung disease 2/2 covid-19) c/o coughing up bright red blood today x 2 episodes. Pt provides a white towel with several bloody stains (2x2cm) from home and a bottle with a blood clot (0.5x2cm in size).  No prior episodes like that in the past. Reports 3 weeks of coughing with worsening SOB. Also reports associated worsening bilateral lower extremity swelling. States has had no palpitations, CP, fevers, Abd pain, n/v/d; Of note, patient was treated with Azithromycin x 5 days by PCP with no relief. Reports no recent travel, no hx of TB, non-smoker.   Notified by RN that patient c/o chills. Patient seen at bedside and says the chills happened suddenly but otherwise has no other new symptoms. States that she still has her HA that is unchanged from earlier in the day.     REVIEW OF SYSTEMS:  Constitutional Symptoms: (+) chills, (+) HA. No weakness  Eyes: No visual changes, eye pain, double vision  Ears, Nose, Mouth, Throat: No runny nose, sinus pain, ear pain, sore throat  Cardiovascular: No chest pain, palpitations, edema  Respiratory: No cough, wheezing, hemoptysis, shortness of breath  Gastrointestinal: No abdominal pain, N/V/D/C  Genitourinary: No dysuria, frequency  Musculoskeletal: No joint pain    Positives and pertinent negatives noted and all other systems negative.      Vital Signs Last 24 Hrs  T(C): 38.1 (10-15-22 @ 01:30), Max: 39 (10-14-22 @ 22:35)  T(F): 100.5 (10-15-22 @ 01:30), Max: 102.2 (10-14-22 @ 22:35)  HR: 85 (10-15-22 @ 00:17) (79 - 98)  BP: 178/96 (10-14-22 @ 22:35) (120/69 - 178/96)  BP(mean): --  RR: 20 (10-14-22 @ 22:35) (16 - 20)  SpO2: 94% (10-15- @ 00:17) (91% - 100%)      PHYSICAL EXAM:  GENERAL: No acute distress, well-developed, lying in bed on BiPAP for VENU  HEAD:  Atraumatic, Normocephalic  EYES: EOMI, PERRLA, conjunctiva and sclera clear  NECK: Supple, no lymphadenopathy, no JVD  CHEST/LUNG: CTAB; No wheezes, rales, or rhonchi  HEART: RRR; No murmurs, rubs, or gallops  ABDOMEN: Hyperactive bowel sounds. Soft, non-tender, non-distended; no organomegaly  NEUROLOGY: AAO x 4, no focal deficits      LABS/IMAGIN.4   9.39  )-----------( 257      ( 14 Oct 2022 23:30 )             37.4     Lactate, Blood: 1.1 mmol/L (10.14.22 @ 23:30)    Procalcitonin, Serum: 0.04 (10.14.22 @ 23:30)        ASSESSMENT/PLAN:     1. Febrile  - Rectal temp 102.2  - CBC, Procal, Lactate, BCx x2, UA, CXR, RVP  - Tylenol 1 g for fever  - Repeat temp 100.5 at 01:30 and patient reports improvement of symptoms           Rosa De Anda PA-C  Pager [phone] CC: chills    HPI: 72yo Female with MHx aneurysm, HTN, DM Type 2, HLD, h/o Covid-19  (residual symptoms, home oxygen intermittently, NC 2L/min PRN, diagnosed with chronic lung disease 2/2 covid-19) c/o coughing up bright red blood today x 2 episodes. Pt provides a white towel with several bloody stains (2x2cm) from home and a bottle with a blood clot (0.5x2cm in size).  No prior episodes like that in the past. Reports 3 weeks of coughing with worsening SOB. Also reports associated worsening bilateral lower extremity swelling. States has had no palpitations, CP, fevers, Abd pain, n/v/d; Of note, patient was treated with Azithromycin x 5 days by PCP with no relief. Reports no recent travel, no hx of TB, non-smoker.   Notified by RN that patient c/o chills. Patient seen at bedside and says the chills happened suddenly but otherwise has no other new symptoms. States that she still has her HA that is unchanged from earlier in the day.     REVIEW OF SYSTEMS:  Constitutional Symptoms: (+) chills, (+) HA. No weakness  Eyes: No visual changes, eye pain, double vision  Ears, Nose, Mouth, Throat: No runny nose, sinus pain, ear pain, sore throat  Cardiovascular: No chest pain, palpitations, edema  Respiratory: No cough, wheezing, hemoptysis, shortness of breath  Gastrointestinal: No abdominal pain, N/V/D/C  Genitourinary: No dysuria, frequency  Musculoskeletal: No joint pain    Positives and pertinent negatives noted and all other systems negative.      Vital Signs Last 24 Hrs  T(C): 38.1 (10-15-22 @ 01:30), Max: 39 (10-14-22 @ 22:35)  T(F): 100.5 (10-15-22 @ 01:30), Max: 102.2 (10-14-22 @ 22:35)  HR: 85 (10-15-22 @ 00:17) (79 - 98)  BP: 178/96 (10-14-22 @ 22:35) (120/69 - 178/96)  BP(mean): --  RR: 20 (10-14-22 @ 22:35) (16 - 20)  SpO2: 94% (10-15-22 @ 00:17) (91% - 100%)      PHYSICAL EXAM:  GENERAL: No acute distress, well-developed, lying in bed on BiPAP for VENU  HEAD:  Atraumatic, Normocephalic  EYES: EOMI, PERRLA, conjunctiva and sclera clear  NECK: Supple, no lymphadenopathy, no JVD  CHEST/LUNG: CTAB; No wheezes, rales, or rhonchi  HEART: RRR; No murmurs, rubs, or gallops  ABDOMEN: Hyperactive bowel sounds. Soft, non-tender, non-distended; no organomegaly  NEUROLOGY: AAO x 4, no focal deficits      LABS/IMAGIN.4   9.39  )-----------( 257      ( 14 Oct 2022 23:30 )             37.4     Lactate, Blood: 1.1 mmol/L (10.14.22 @ 23:30)    Procalcitonin, Serum: 0.04 (10..22 @ 23:30)    Respiratory Viral Panel with COVID-19 by DIEUDONNE (10.15.22 @ 00:27)    Rapid RVP Result: [facility]    SARS-CoV-2: [facility]        ASSESSMENT/PLAN:     1. Febrile  - Rectal temp 102.2  - CBC, Procal, Lactate, BCx x2, UA, CXR, RVP  - Tylenol 1 g for fever  - Repeat temp 100.5 at 01:30 and patient reports improvement of symptoms           Rosa De Anda PA-C  Pager [phone] CC: chills    HPI: 72yo Female with MHx aneurysm, HTN, DM Type 2, HLD, h/o Covid-19  (residual symptoms, home oxygen intermittently, NC 2L/min PRN, diagnosed with chronic lung disease 2/2 covid-19) c/o coughing up bright red blood today x 2 episodes. Pt provides a white towel with several bloody stains (2x2cm) from home and a bottle with a blood clot (0.5x2cm in size).  No prior episodes like that in the past. Reports 3 weeks of coughing with worsening SOB. Also reports associated worsening bilateral lower extremity swelling. States has had no palpitations, CP, fevers, Abd pain, n/v/d; Of note, patient was treated with Azithromycin x 5 days by PCP with no relief. Reports no recent travel, no hx of TB, non-smoker.   Notified by RN that patient c/o chills. Patient seen at bedside and says the chills happened suddenly but otherwise has no other new symptoms. States that she still has her HA that is unchanged from earlier in the day.     REVIEW OF SYSTEMS:  Constitutional Symptoms: (+) chills, (+) HA. No weakness  Eyes: No visual changes, eye pain, double vision  Ears, Nose, Mouth, Throat: No runny nose, sinus pain, ear pain, sore throat  Cardiovascular: No chest pain, palpitations, edema  Respiratory: No cough, wheezing, hemoptysis, shortness of breath  Gastrointestinal: No abdominal pain, N/V/D/C  Genitourinary: No dysuria, frequency  Musculoskeletal: No joint pain    Positives and pertinent negatives noted and all other systems negative.      Vital Signs Last 24 Hrs  T(C): 38.1 (10-15-22 @ 01:30), Max: 39 (10-14-22 @ 22:35)  T(F): 100.5 (10-15-22 @ 01:30), Max: 102.2 (10-14-22 @ 22:35)  HR: 85 (10-15-22 @ 00:17) (79 - 98)  BP: 178/96 (10-14-22 @ 22:35) (120/69 - 178/96)  BP(mean): --  RR: 20 (10-14-22 @ 22:35) (16 - 20)  SpO2: 94% (10-15-22 @ 00:17) (91% - 100%)      PHYSICAL EXAM:  GENERAL: No acute distress, well-developed, lying in bed on BiPAP for VENU  HEAD:  Atraumatic, Normocephalic  EYES: EOMI, PERRLA, conjunctiva and sclera clear  NECK: Supple, no lymphadenopathy, no JVD  CHEST/LUNG: CTAB; No wheezes, rales, or rhonchi  HEART: RRR; No murmurs, rubs, or gallops  ABDOMEN: Hyperactive bowel sounds. Soft, non-tender, non-distended; no organomegaly  NEUROLOGY: AAO x 4, no focal deficits      LABS/IMAGIN.4   9.39  )-----------( 257      ( 14 Oct 2022 23:30 )             37.4     Lactate, Blood: 1.1 mmol/L (10.14.22 @ 23:30)    Procalcitonin, Serum: 0.04 (10.14.22 @ 23:30)    Respiratory Viral Panel with COVID-19 by DIEUDONNE (10.15.22 @ 00:27)    Rapid RVP Result: NotDetec    SARS-CoV-2: NotDetec: This Respiratory Panel uses polymerase chain reaction (PCR) to detect for  adenovirus; coronavirus (HKU1, NL63, 229E, OC43); human metapneumovirus  (hMPV); human enterovirus/rhinovirus (Entero/RV); influenza A; influenza  A/H1; influenza A/H3; influenza A/H1-2009; influenza B; parainfluenza  viruses 1, 2, 3, 4; respiratory syncytial virus; Mycoplasma pneumoniae;  Chlamydophila pneumoniae; and SARS-CoV-2.    Adenovirus (RapRVP): NotDetec    Influenza A (RapRVP): NotDetec    Influenza B (RapRVP): NotDetec    Parainfluenza 1 (RapRVP): NotDetec    Parainfluenza 2 (RapRVP): NotDetec    Parainfluenza 3 (RapRVP): NotDetec    Parainfluenza 4 (RapRVP): NotDetec    Resp Syncytial Virus (RapRVP): NotDetec    Bordetella pertussis (RapRVP): NotDetec    Bordetella parapertussis (RapRVP): NotDetec    Chlamydia pneumoniae (RapRVP): NotDetec    Mycoplasma pneumoniae (RapRVP): NotDetec    Entero/Rhinovirus (RapRVP): NotDetec    HKU1 Coronavirus (RapRVP): NotDetec    NL63 Coronavirus (RapRVP): NotDetec    229E Coronavirus (RapRVP): NotDetec    OC43 Coronavirus (RapRVP): NotDetec    hMPV (RapRVP): NotDetec          ASSESSMENT/PLAN:     1. Febrile  - Rectal temp 102.2  - CBC, Procal, Lactate, BCx x2, UA, CXR, RVP  - Tylenol 1 g for fever  - Repeat temp 100.5 at 01:30 and patient reports improvement of symptoms           Rosa De Anda PA-C  Pager [phone] CC: chills    HPI: 72yo Female with MHx aneurysm, HTN, DM Type 2, HLD, h/o Covid-19  (residual symptoms, home oxygen intermittently, NC 2L/min PRN, diagnosed with chronic lung disease 2/2 covid-19) c/o coughing up bright red blood today x 2 episodes. Pt provides a white towel with several bloody stains (2x2cm) from home and a bottle with a blood clot (0.5x2cm in size).  No prior episodes like that in the past. Reports 3 weeks of coughing with worsening SOB. Also reports associated worsening bilateral lower extremity swelling. States has had no palpitations, CP, fevers, Abd pain, n/v/d; Of note, patient was treated with Azithromycin x 5 days by PCP with no relief. Reports no recent travel, no hx of TB, non-smoker.   Notified by RN that patient c/o chills. Patient seen at bedside and says the chills happened suddenly but otherwise has no other new symptoms. States that she still has her HA that is unchanged from earlier in the day.     REVIEW OF SYSTEMS:  Constitutional Symptoms: (+) chills, (+) HA. No weakness  Eyes: No visual changes, eye pain, double vision  Ears, Nose, Mouth, Throat: No runny nose, sinus pain, ear pain, sore throat  Cardiovascular: No chest pain, palpitations, edema  Respiratory: No cough, wheezing, hemoptysis, shortness of breath  Gastrointestinal: No abdominal pain, N/V/D/C  Genitourinary: No dysuria, frequency  Musculoskeletal: No joint pain    Positives and pertinent negatives noted and all other systems negative.      Vital Signs Last 24 Hrs  T(C): 38.1 (10-15-22 @ 01:30), Max: 39 (10-14-22 @ 22:35)  T(F): 100.5 (10-15-22 @ 01:30), Max: 102.2 (10-14-22 @ 22:35)  HR: 85 (10-15-22 @ 00:17) (79 - 98)  BP: 178/96 (10-14-22 @ 22:35) (120/69 - 178/96)  BP(mean): --  RR: 20 (10-14-22 @ 22:35) (16 - 20)  SpO2: 94% (10-15-22 @ 00:17) (91% - 100%)      PHYSICAL EXAM:  GENERAL: No acute distress, well-developed, lying in bed on BiPAP for VENU  HEAD:  Atraumatic, Normocephalic  EYES: EOMI, PERRLA, conjunctiva and sclera clear  NECK: Supple, no lymphadenopathy, no JVD  CHEST/LUNG: CTAB; No wheezes, rales, or rhonchi  HEART: RRR; No murmurs, rubs, or gallops  ABDOMEN: Hyperactive bowel sounds. Soft, non-tender, non-distended; no organomegaly  NEUROLOGY: AAO x 4, no focal deficits      LABS/IMAGIN.4   9.39  )-----------( 257      ( 14 Oct 2022 23:30 )             37.4     Lactate, Blood: 1.1 mmol/L (10.14.22 @ 23:30)    Procalcitonin, Serum: 0.04 (10.14.22 @ 23:30)    Respiratory Viral Panel with COVID-19 by DIEUDONNE (10.15.22 @ 00:27)    Rapid RVP Result: NotDetec    SARS-CoV-2: NotDetec: This Respiratory Panel uses polymerase chain reaction (PCR) to detect for  adenovirus; coronavirus (HKU1, NL63, 229E, OC43); human metapneumovirus  (hMPV); human enterovirus/rhinovirus (Entero/RV); influenza A; influenza  A/H1; influenza A/H3; influenza A/H1-2009; influenza B; parainfluenza  viruses 1, 2, 3, 4; respiratory syncytial virus; Mycoplasma pneumoniae;  Chlamydophila pneumoniae; and SARS-CoV-2.    Adenovirus (RapRVP): NotDetec    Influenza A (RapRVP): NotDetec    Influenza B (RapRVP): NotDetec    Parainfluenza 1 (RapRVP): NotDetec    Parainfluenza 2 (RapRVP): NotDetec    Parainfluenza 3 (RapRVP): NotDetec    Parainfluenza 4 (RapRVP): NotDetec    Resp Syncytial Virus (RapRVP): NotDetec    Bordetella pertussis (RapRVP): NotDetec    Bordetella parapertussis (RapRVP): NotDetec    Chlamydia pneumoniae (RapRVP): NotDetec    Mycoplasma pneumoniae (RapRVP): NotDetec    Entero/Rhinovirus (RapRVP): NotDetec    HKU1 Coronavirus (RapRVP): NotDetec    NL63 Coronavirus (RapRVP): NotDetec    229E Coronavirus (RapRVP): NotDetec    OC43 Coronavirus (RapRVP): NotDetec    hMPV (RapRVP): NotDetec      Urinalysis Basic - ( 15 Oct 2022 05:00 )    Color: Light Yellow / Appearance: Clear / S.009 / pH: x  Gluc: x / Ketone: Negative  / Bili: Negative / Urobili: <2 mg/dL   Blood: x / Protein: Trace / Nitrite: Negative   Leuk Esterase: Large / RBC: 0 /HPF / WBC 69 /HPF   Sq Epi: x / Non Sq Epi: 1 /HPF / Bacteria: Few        ASSESSMENT/PLAN:     1. UTI  - Rectal temp 102.2  - CBC, Procal, Lactate, BCx x2, UA, CXR, RVP  - Tylenol 1 g for fever  - Repeat temp 100.5 at 01:30 and patient reports improvement of symptoms   - UA positive, UCx ordered  - Cipro 500 mg PO daily x3d ordered due to PCN allergy           Rosa De Anda PA-C  Pager [phone]

## 2022-10-14 NOTE — PROGRESS NOTE ADULT - SUBJECTIVE AND OBJECTIVE BOX
Date of Service  : 10-14-22     INTERVAL HPI/OVERNIGHT EVENTS: Right side headache and neck pain.   Vital Signs Last 24 Hrs  T(C): 36.7 (14 Oct 2022 12:45), Max: 37.2 (14 Oct 2022 05:55)  T(F): 98.1 (14 Oct 2022 12:45), Max: 98.9 (14 Oct 2022 05:55)  HR: 86 (14 Oct 2022 12:45) (83 - 109)  BP: 124/65 (14 Oct 2022 12:45) (120/69 - 151/95)  BP(mean): 87 (13 Oct 2022 16:00) (87 - 91)  RR: 18 (14 Oct 2022 12:45) (12 - 18)  SpO2: 97% (14 Oct 2022 12:45) (97% - 100%)    Parameters below as of 14 Oct 2022 12:45  Patient On (Oxygen Delivery Method): nasal cannula  O2 Flow (L/min): 4    I&O's Summary    13 Oct 2022 07:01  -  14 Oct 2022 07:00  --------------------------------------------------------  IN: 1250 mL / OUT: 1000 mL / NET: 250 mL      MEDICATIONS  (STANDING):  atorvastatin 40 milliGRAM(s) Oral at bedtime  budesonide 160 MICROgram(s)/formoterol 4.5 MICROgram(s) Inhaler 2 Puff(s) Inhalation two times a day  cyclobenzaprine 5 milliGRAM(s) Oral once  dextrose 5%. 1000 milliLiter(s) (100 mL/Hr) IV Continuous <Continuous>  dextrose 5%. 1000 milliLiter(s) (50 mL/Hr) IV Continuous <Continuous>  dextrose 50% Injectable 25 Gram(s) IV Push once  dextrose 50% Injectable 12.5 Gram(s) IV Push once  dextrose 50% Injectable 25 Gram(s) IV Push once  glucagon  Injectable 1 milliGRAM(s) IntraMuscular once  hydrALAZINE 25 milliGRAM(s) Oral three times a day  influenza  Vaccine (HIGH DOSE) 0.7 milliLiter(s) IntraMuscular once  insulin glargine Injectable (LANTUS) 32 Unit(s) SubCutaneous every morning  insulin lispro (ADMELOG) corrective regimen sliding scale   SubCutaneous three times a day before meals  insulin lispro (ADMELOG) corrective regimen sliding scale   SubCutaneous at bedtime  insulin lispro Injectable (ADMELOG) 10 Unit(s) SubCutaneous three times a day before meals  losartan 100 milliGRAM(s) Oral daily  metoprolol tartrate 25 milliGRAM(s) Oral two times a day  multivitamin 1 Tablet(s) Oral daily    MEDICATIONS  (PRN):  acetaminophen     Tablet .. 650 milliGRAM(s) Oral every 6 hours PRN Temp greater or equal to 38C (100.4F), Mild Pain (1 - 3)  ALBUTerol    90 MICROgram(s) HFA Inhaler 1 Puff(s) Inhalation every 6 hours PRN Shortness of Breath  cyclobenzaprine 5 milliGRAM(s) Oral three times a day PRN Muscle Spasm  dextrose Oral Gel 15 Gram(s) Oral once PRN Blood Glucose LESS THAN 70 milliGRAM(s)/deciliter  melatonin 3 milliGRAM(s) Oral at bedtime PRN Insomnia  ondansetron Injectable 4 milliGRAM(s) IV Push every 8 hours PRN Nausea and/or Vomiting    LABS:                        11.7   9.49  )-----------( 282      ( 14 Oct 2022 04:02 )             38.8     10-14    136  |  96<L>  |  15  ----------------------------<  180<H>  4.6   |  32<H>  |  1.06    Ca    9.0      14 Oct 2022 04:02  Phos  5.4     10-14  Mg     2.30     10-14      PT/INR - ( 13 Oct 2022 06:00 )   PT: 11.5 sec;   INR: 0.99 ratio         PTT - ( 13 Oct 2022 06:00 )  PTT:29.7 sec    CAPILLARY BLOOD GLUCOSE      POCT Blood Glucose.: 131 mg/dL (14 Oct 2022 11:54)  POCT Blood Glucose.: 144 mg/dL (14 Oct 2022 07:39)  POCT Blood Glucose.: 145 mg/dL (13 Oct 2022 20:57)  POCT Blood Glucose.: 146 mg/dL (13 Oct 2022 16:59)  POCT Blood Glucose.: 136 mg/dL (13 Oct 2022 16:12)          REVIEW OF SYSTEMS:  CONSTITUTIONAL: No fever, weight loss, or fatigue  EYES: No eye pain, visual disturbances, or discharge  ENMT:  No difficulty hearing, tinnitus, vertigo; No sinus or throat pain  NECK: No pain or stiffness  RESPIRATORY: No cough, wheezing, chills or hemoptysis; No shortness of breath  CARDIOVASCULAR: No chest pain, palpitations, dizziness, or leg swelling  GASTROINTESTINAL: No abdominal or epigastric pain. No nausea, vomiting, or hematemesis; No diarrhea or constipation. No melena or hematochezia.  GENITOURINARY: No dysuria, frequency, hematuria, or incontinence  NEUROLOGICAL: No  memory loss, loss of strength, numbness, or tremors      Consultant(s) Notes Reviewed:  [x ] YES  [ ] NO    PHYSICAL EXAM:  GENERAL: NAD, NC Oxygen   HEAD:  Atraumatic, Normocephalic  EYES: EOMI, PERRLA, conjunctiva and sclera clear  ENMT: No tonsillar erythema, exudates, or enlargement; Moist mucous membranes, Good dentition, No lesions  NECK: Supple, No JVD, Normal thyroid  NERVOUS SYSTEM:  Alert & Oriented X3, No focal deficit   CHEST/LUNG: Good air entry bilateral with no  rales, rhonchi, wheezing, or rubs  HEART: Regular rate and rhythm; No murmurs, rubs, or gallops  ABDOMEN: Soft, Nontender, Nondistended; Bowel sounds present  EXTREMITIES:  2+ Peripheral Pulses, No clubbing, cyanosis, or edema    Care Discussed with Consultants/Other Providers [ x] YES  [ ] NO

## 2022-10-14 NOTE — PROGRESS NOTE ADULT - ASSESSMENT
72yo Female with MHx aneurysm, HTN, DM Type 2, HLD, h/o Covid-19  (residual symptoms, home oxygen intermittently, NC 2L/min PRN, diagnosed with chronic lung disease 2/2 covid-19) c/o coughing up bright red blood today x 2 episodes. Pt provides a white towel with several bloody stains (2x2cm) from home and a bottle with a blood clot (0.5x2cm in size).  No prior episodes like that in the past. Reports 3 weeks of coughing with worsening SOB. Also reports associated worsening bilateral lower extremity swelling. States has had no palpitations, CP, fevers, Abd pain, n/v/d; Of note, patient was treated with Azithromycin x 5 days by PCP with no relief. Reports no recent travel, no hx of TB, non-smoker. I feel fine today .      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   ·  Plan: -On Oxygen .  Cardiology and pulmonary  helping.   < from: Transthoracic Echocardiogram (10.11.22 @ 07:20) >  CONCLUSIONS:  1. Mitral annular calcification, otherwise normal mitral  valve. Minimal mitral regurgitation.  2. Endocardium not well visualized; grossly normal left  ventricular systolic function.  3. The right ventricle is not well visualized; grossly  normal right ventricular systolic function.    < end of copied text >       Problem/Plan - 2:  ·  Problem: Hemoptysis.   ·  Plan: S/P  Bronchoscopy and ENT evaluation.   CTA chest: No main, right main, left main, lobar or segmental pulmonary embolism. Limited evaluation of subsegmental pulmonary arteries secondary to motion and mixing artifact. Patent central airways. Diffuse bilateral   groundglass opacities and interlobular septal thickening consistent with pulmonary edema. No pleural effusion.  -Hold Heparin for DVT  -Pulmonology following.   -Supp O2 NC  -VS q4h  -Elev HOB.     Problem/Plan - 3:  ·  Problem: Type 2 diabetes mellitus with hyperglycemia, with long-term current use of insulin.   ·  Plan: -Sugars fine.  Conservative insulin dosage: Lantus 40u -->32u in AM, Novolog 25u TID --> 10u TID while inpt  -monitor closely FS (pt states that skips insulin sometimes due to low BG)   -moderate IASS  -a1c  -DM diet.     Problem/Plan - 4:  ·  Problem: Headache and Neck Pain .   ·  Plan: CT head with CTA Head and Neck pending.      Problem/Plan - 5:  ·  Problem: DVT prophylaxis.   ·  Plan: No Heparin due to hemoptysis   SCDs.    Dispo : DC planning pending CT scans and PT recommendations ,

## 2022-10-14 NOTE — PROGRESS NOTE ADULT - SUBJECTIVE AND OBJECTIVE BOX
Cardiology/Vascular Medicine Inpatient Progress Note    S/p bronchoscopy -- no source of bleeding noted    Patient states having discomfort on the right side of her neck and back.    No significant interval changes noted on telemetry.    Vital Signs Last 24 Hrs  T(C): 36.9 (14 Oct 2022 09:55), Max: 37.2 (14 Oct 2022 05:55)  T(F): 98.5 (14 Oct 2022 09:55), Max: 98.9 (14 Oct 2022 05:55)  HR: 86 (14 Oct 2022 09:55) (79 - 109)  BP: 120/69 (14 Oct 2022 09:55) (120/69 - 151/95)  BP(mean): 87 (13 Oct 2022 16:00) (78 - 99)  RR: 18 (14 Oct 2022 09:55) (12 - 24)  SpO2: 97% (14 Oct 2022 09:55) (95% - 100%)    Parameters below as of 14 Oct 2022 09:55  Patient On (Oxygen Delivery Method): nasal cannula  O2 Flow (L/min): 4    I&O's Detail    13 Oct 2022 07:01  -  14 Oct 2022 07:00  --------------------------------------------------------  IN:    IV PiggyBack: 800 mL    Oral Fluid: 450 mL  Total IN: 1250 mL    OUT:    Voided (mL): 1000 mL  Total OUT: 1000 mL    Total NET: 250 mL      Appearance: NAD  HEENT:   No JVD  Cardiovascular: Normal S1 S2  Respiratory: Decreased breath sounds bilaterally  Gastrointestinal:  Soft, Non-tender, + BS	  Neurologic: Non-focal    MEDICATIONS  (STANDING):  atorvastatin 40 milliGRAM(s) Oral at bedtime  budesonide 160 MICROgram(s)/formoterol 4.5 MICROgram(s) Inhaler 2 Puff(s) Inhalation two times a day  dextrose 5%. 1000 milliLiter(s) (50 mL/Hr) IV Continuous <Continuous>  dextrose 5%. 1000 milliLiter(s) (100 mL/Hr) IV Continuous <Continuous>  dextrose 50% Injectable 25 Gram(s) IV Push once  dextrose 50% Injectable 12.5 Gram(s) IV Push once  dextrose 50% Injectable 25 Gram(s) IV Push once  glucagon  Injectable 1 milliGRAM(s) IntraMuscular once  hydrALAZINE 25 milliGRAM(s) Oral three times a day  influenza  Vaccine (HIGH DOSE) 0.7 milliLiter(s) IntraMuscular once  insulin glargine Injectable (LANTUS) 32 Unit(s) SubCutaneous every morning  insulin lispro (ADMELOG) corrective regimen sliding scale   SubCutaneous three times a day before meals  insulin lispro (ADMELOG) corrective regimen sliding scale   SubCutaneous at bedtime  insulin lispro Injectable (ADMELOG) 10 Unit(s) SubCutaneous three times a day before meals  ketorolac   Injectable 15 milliGRAM(s) IV Push once  losartan 100 milliGRAM(s) Oral daily  metoprolol tartrate 25 milliGRAM(s) Oral two times a day  multivitamin 1 Tablet(s) Oral daily    MEDICATIONS  (PRN):  acetaminophen     Tablet .. 650 milliGRAM(s) Oral every 6 hours PRN Temp greater or equal to 38C (100.4F), Mild Pain (1 - 3)  ALBUTerol    90 MICROgram(s) HFA Inhaler 1 Puff(s) Inhalation every 6 hours PRN Shortness of Breath  dextrose Oral Gel 15 Gram(s) Oral once PRN Blood Glucose LESS THAN 70 milliGRAM(s)/deciliter  melatonin 3 milliGRAM(s) Oral at bedtime PRN Insomnia  ondansetron Injectable 4 milliGRAM(s) IV Push every 8 hours PRN Nausea and/or Vomiting      LABS:	 	                          11.7   9.49  )-----------( 282      ( 14 Oct 2022 04:02 )             38.8   10-14    136  |  96<L>  |  15  ----------------------------<  180<H>  4.6   |  32<H>  |  1.06    Ca    9.0      14 Oct 2022 04:02  Phos  5.4     10-14  Mg     2.30     10-14    PT/INR - ( 13 Oct 2022 06:00 )   PT: 11.5 sec;   INR: 0.99 ratio      PTT - ( 13 Oct 2022 06:00 )  PTT:29.7 sec          proBNP: Serum Pro-Brain Natriuretic Peptide: 1206 pg/mL (10-10-22 @ 18:50)    < from: CT Angio Chest PE Protocol w/ IV Cont (10.10.22 @ 21:46) >  ACC: [id number] EXAM:  CT ABDOMEN AND PELVIS IC                        ACC: [id number] EXAM:  CT ANGIO CHEST PULM LA ZIMMERMAN                          PROCEDURE DATE:  10/10/2022          INTERPRETATION:  CLINICAL INFORMATION: Cough and worsening dyspnea. Rule   out PE    COMPARISON: None available    CONTRAST/COMPLICATIONS:  IV Contrast: Omnipaque 350  90 cc administered   10 cc discarded  Oral Contrast: NONE  Complications: None reported at time of study completion    PROCEDURE:  CT Angiography of the Chest was performed followed by portal venous phase   imaging of the Abdomen and Pelvis.  Sagittal and coronal reformats were performed as well as 3D (MIP)   reconstructions.    FINDINGS:  CHEST:  LUNGS AND LARGE AIRWAYS: Patent central airways. Diffuse bilateral   groundglass opacities and interlobular septal thickening consistent with   pulmonary edema.  PLEURA: No pleural effusion.  VESSELS: Aortic and coronary artery calcifications. No main, right main,   left main, lobar or segmental pulmonary embolism. Limited evaluation of   subsegmental pulmonary arteries secondary to motion and mixing artifact.  HEART: Cardiomegaly. Trace pericardial effusion. Mitral valve annular   calcifications.  MEDIASTINUM AND YAAKOV: Prominent mediastinal lymph nodes measure less than   1.5 cm in short axis diameter.  CHEST WALL AND LOWER NECK: Within normal limits.    ABDOMEN AND PELVIS:  LIVER: Within normal limits.  BILE DUCTS: Normal caliber.  GALLBLADDER: Within normal limits.  SPLEEN: Within normal limits.  PANCREAS: Within normal limits.  ADRENALS: Within normal limits.  KIDNEYS/URETERS: Left renal cysts and additional bilateral hypodensities   too small characterize. No hydronephrosis.    BLADDER: Within normal limits.  REPRODUCTIVE ORGANS: Fibroid uterus. A 4.0 cm right adnexal cyst.    BOWEL: No bowel obstruction. Appendix is normal.  PERITONEUM: No ascites.  VESSELS: Atherosclerotic changes.  RETROPERITONEUM/LYMPH NODES: No lymphadenopathy.  ABDOMINAL WALL: Small fat-containing umbilicalhernia.  BONES: Degenerative changes.    IMPRESSION:  Cardiomegaly and moderate pulmonary edema.    No main, right main, left main, lobar or segmental pulmonary embolism.   Limited evaluation of subsegmental pulmonary arteries secondary to motion   and mixing artifact..        --- End of Report ---          LIZZ NATHAN MD; Resident Radiologist  This document has been electronically signed.  NICHOLAS RITCHIE MD; Attending Radiologist  This document has been electronically signed. Oct 10 2022 10:35PM    < end of copied text >  	  < from: Transthoracic Echocardiogram (10.11.22 @ 07:20) >    Patient name: JONNY WORTHY  YOB: 1950   Age: 71 (F)   MR#: [id number]  Study Date: 10/11/2022  [facility]  Study quality: Technically Difficult  Referring Physician: Keegan Plascencia MD, PhD  Blood Pressure: 134/86 mmHg  Height: 168 cm  Weight: 101 kg  BSA: 2.1 m2  ------------------------------------------------------------------------  PROCEDURE: Transthoracic echocardiogram with 2-D, M-Mode  and complete spectral and color flow Doppler.  INDICATION: Heart failure, unspecified (I50.9)  ------------------------------------------------------------------------  OBSERVATIONS:  Mitral Valve: Mitral annular calcification, otherwise  normal mitral valve. Minimal mitral regurgitation.  Aortic Root: Normal aortic root.  Aortic Valve: Aortic valve leaflet morphology not well  visualized.  Left Atrium: Normal left atrium.  Left Ventricle: Endocardium not well visualized; grossly  normal left ventricular systolic function.  Right Heart: Normalright atrium. The right ventricle is  not well visualized; grossly normal right ventricular  systolic function. Normal tricuspid valve. Minimal  tricuspid regurgitation. Normal pulmonic valve.  Pericardium/PleuraNormal pericardium with no pericardial  effusion.  ------------------------------------------------------------------------  CONCLUSIONS:  1. Mitral annular calcification, otherwise normal mitral  valve. Minimal mitral regurgitation.  2. Endocardium not well visualized; grossly normal left  ventricular systolic function.  3. The right ventricle is not well visualized; grossly  normal right ventricular systolic function.  ------------------------------------------------------------------------  Confirmed on  10/11/2022 - 08:38:01 by Merlin Wilkerson M.D.,  [facility], [place]  ------------------------------------------------------------------------    < end of copied text >

## 2022-10-14 NOTE — CHART NOTE - NSCHARTNOTEFT_GEN_A_CORE
no acute events overnight   pt POD #1 flexible bronchoscopy: no active bleeding or blood noted in the airway during the procedure  thoracic surgery to sign off   please call thoracic surgery at [phone] with any questions

## 2022-10-14 NOTE — CONSULT NOTE ADULT - SUBJECTIVE AND OBJECTIVE BOX
CC:    HPI:       PAST MEDICAL & SURGICAL HISTORY:  Diabetes mellitus      Hypertension      COVID-19      No significant past surgical history        Allergies    penicillin (Unknown)    Intolerances      MEDICATIONS  (STANDING):  atorvastatin 40 milliGRAM(s) Oral at bedtime  budesonide 160 MICROgram(s)/formoterol 4.5 MICROgram(s) Inhaler 2 Puff(s) Inhalation two times a day  dextrose 5%. 1000 milliLiter(s) (100 mL/Hr) IV Continuous <Continuous>  dextrose 5%. 1000 milliLiter(s) (50 mL/Hr) IV Continuous <Continuous>  dextrose 50% Injectable 25 Gram(s) IV Push once  dextrose 50% Injectable 12.5 Gram(s) IV Push once  dextrose 50% Injectable 25 Gram(s) IV Push once  glucagon  Injectable 1 milliGRAM(s) IntraMuscular once  hydrALAZINE 25 milliGRAM(s) Oral three times a day  influenza  Vaccine (HIGH DOSE) 0.7 milliLiter(s) IntraMuscular once  insulin glargine Injectable (LANTUS) 32 Unit(s) SubCutaneous every morning  insulin lispro (ADMELOG) corrective regimen sliding scale   SubCutaneous three times a day before meals  insulin lispro (ADMELOG) corrective regimen sliding scale   SubCutaneous at bedtime  insulin lispro Injectable (ADMELOG) 10 Unit(s) SubCutaneous three times a day before meals  losartan 100 milliGRAM(s) Oral daily  metoprolol tartrate 25 milliGRAM(s) Oral two times a day  multivitamin 1 Tablet(s) Oral daily    MEDICATIONS  (PRN):  acetaminophen     Tablet .. 650 milliGRAM(s) Oral every 6 hours PRN Temp greater or equal to 38C (100.4F), Mild Pain (1 - 3)  ALBUTerol    90 MICROgram(s) HFA Inhaler 1 Puff(s) Inhalation every 6 hours PRN Shortness of Breath  dextrose Oral Gel 15 Gram(s) Oral once PRN Blood Glucose LESS THAN 70 milliGRAM(s)/deciliter  melatonin 3 milliGRAM(s) Oral at bedtime PRN Insomnia  ondansetron Injectable 4 milliGRAM(s) IV Push every 8 hours PRN Nausea and/or Vomiting      Social History: **??**    Family history: No pertinent family history in first degree relatives    ROS:   ENT: all negative except as noted in HPI   CV: denies palpitations  Pulm: denies SOB, cough, hemoptysis  GI: denies change in appetite, indigestion, n/v  : denies pertinent urinary symptoms, urgency  Neuro: denies numbness/tingling, loss of sensation  Psych: denies anxiety  MS: denies muscle weakness, instability  Heme: denies easy bruising or bleeding  Endo: denies heat/cold intolerance, excessive sweating  Vascular: denies LE edema    Vital Signs Last 24 Hrs  T(C): 36.9 (14 Oct 2022 09:55), Max: 37.2 (14 Oct 2022 05:55)  T(F): 98.5 (14 Oct 2022 09:55), Max: 98.9 (14 Oct 2022 05:55)  HR: 86 (14 Oct 2022 12:45) (79 - 109)  BP: 124/65 (14 Oct 2022 12:45) (120/69 - 151/95)  BP(mean): 87 (13 Oct 2022 16:00) (78 - 99)  RR: 18 (14 Oct 2022 12:45) (12 - 24)  SpO2: 97% (14 Oct 2022 12:45) (95% - 100%)    Parameters below as of 14 Oct 2022 12:45  Patient On (Oxygen Delivery Method): nasal cannula  O2 Flow (L/min): 4                            11.7   9.49  )-----------( 282      ( 14 Oct 2022 04:02 )             38.8    10-14    136  |  96<L>  |  15  ----------------------------<  180<H>  4.6   |  32<H>  |  1.06    Ca    9.0      14 Oct 2022 04:02  Phos  5.4     10-14  Mg     2.30     10-14     PT/INR - ( 13 Oct 2022 06:00 )   PT: 11.5 sec;   INR: 0.99 ratio         PTT - ( 13 Oct 2022 06:00 )  PTT:29.7 sec    PHYSICAL EXAM:  Gen: NAD  Skin: No rashes, bruises, or lesions  Head: Normocephalic, Atraumatic  Face: no edema, erythema, or fluctuance. Parotid glands soft without mass  Eyes: no scleral injection  Ears: Right - ear canal clear, TM intact without effusion or erythema. No evidence of any fluid drainage. No mastoid tenderness, erythema, or ear bulging            Left - ear canal clear, TM intact without effusion or erythema. No evidence of any fluid drainage. No mastoid tenderness, erythema, or ear bulging  Nose: Nares bilaterally patent, no discharge  Mouth: No stridor, no drooling, no trismus.  Mucosa moist, tongue/uvula midline, oropharynx clear  Neck: Flat, supple, no lymphadenopathy, trachea midline, no masses  Lymphatic: No lymphadenopathy  Resp: breathing easily, no stridor  CV: no peripheral edema/cyanosis  GI: nondistended   Peripheral vascular: no JVD or edema  Neuro: facial nerve intact, no facial droop      Diagnostic Nasal Endoscopy: (Scope #2 used)    Fiberoptic Indirect laryngoscopy:  (Scope #2 used)    IMAGING/ADDITIONAL STUDIES:  CC: Coughing up blood     HPI: 70yo Female with MHx aneurysm, HTN, DM Type 2, HLD, h/o Covid-19  (residual symptoms, home oxygen intermittently, NC 2L/min PRN, diagnosed with chronic lung disease 2/2 covid-19) p/w coughing up blood about two episodes prior to admission.  ENT consulted for hemoptysis. Pt seen and evaluated at bedside. Pt reported 3 months of months of bloody sputum and has 2 episodes per month. No prior episodes in the past. Reports 3 weeks of coughing with worsening SOB. Reports no recent travel, no hx of TB, non-smoker. Pt endorses HA 10/10 localized to her left temporal area. Of note pt  endorsed no hemoptysis since bronchoscope was preformed yesterday.      PAST MEDICAL & SURGICAL HISTORY:  Diabetes mellitus      Hypertension      COVID-19      No significant past surgical history        Allergies    penicillin (Unknown)    Intolerances      MEDICATIONS  (STANDING):  atorvastatin 40 milliGRAM(s) Oral at bedtime  budesonide 160 MICROgram(s)/formoterol 4.5 MICROgram(s) Inhaler 2 Puff(s) Inhalation two times a day  dextrose 5%. 1000 milliLiter(s) (100 mL/Hr) IV Continuous <Continuous>  dextrose 5%. 1000 milliLiter(s) (50 mL/Hr) IV Continuous <Continuous>  dextrose 50% Injectable 25 Gram(s) IV Push once  dextrose 50% Injectable 12.5 Gram(s) IV Push once  dextrose 50% Injectable 25 Gram(s) IV Push once  glucagon  Injectable 1 milliGRAM(s) IntraMuscular once  hydrALAZINE 25 milliGRAM(s) Oral three times a day  influenza  Vaccine (HIGH DOSE) 0.7 milliLiter(s) IntraMuscular once  insulin glargine Injectable (LANTUS) 32 Unit(s) SubCutaneous every morning  insulin lispro (ADMELOG) corrective regimen sliding scale   SubCutaneous three times a day before meals  insulin lispro (ADMELOG) corrective regimen sliding scale   SubCutaneous at bedtime  insulin lispro Injectable (ADMELOG) 10 Unit(s) SubCutaneous three times a day before meals  losartan 100 milliGRAM(s) Oral daily  metoprolol tartrate 25 milliGRAM(s) Oral two times a day  multivitamin 1 Tablet(s) Oral daily    MEDICATIONS  (PRN):  acetaminophen     Tablet .. 650 milliGRAM(s) Oral every 6 hours PRN Temp greater or equal to 38C (100.4F), Mild Pain (1 - 3)  ALBUTerol    90 MICROgram(s) HFA Inhaler 1 Puff(s) Inhalation every 6 hours PRN Shortness of Breath  dextrose Oral Gel 15 Gram(s) Oral once PRN Blood Glucose LESS THAN 70 milliGRAM(s)/deciliter  melatonin 3 milliGRAM(s) Oral at bedtime PRN Insomnia  ondansetron Injectable 4 milliGRAM(s) IV Push every 8 hours PRN Nausea and/or Vomiting      Social History: no tobacco use, no recent travel   Family history: No pertinent family history in first degree relatives    ROS:   ENT: all negative except as noted in HPI   CV: denies palpitations  GI: denies change in appetite, indigestion, n/v  : denies pertinent urinary symptoms, urgency  Neuro: denies numbness/tingling, loss of sensation  Psych: denies anxiety  MS: denies muscle weakness, instability  Heme: denies easy bruising or bleeding  Endo: denies heat/cold intolerance, excessive sweating      Vital Signs Last 24 Hrs  T(C): 36.9 (14 Oct 2022 09:55), Max: 37.2 (14 Oct 2022 05:55)  T(F): 98.5 (14 Oct 2022 09:55), Max: 98.9 (14 Oct 2022 05:55)  HR: 86 (14 Oct 2022 12:45) (79 - 109)  BP: 124/65 (14 Oct 2022 12:45) (120/69 - 151/95)  BP(mean): 87 (13 Oct 2022 16:00) (78 - 99)  RR: 18 (14 Oct 2022 12:45) (12 - 24)  SpO2: 97% (14 Oct 2022 12:45) (95% - 100%)    Parameters below as of 14 Oct 2022 12:45  Patient On (Oxygen Delivery Method): nasal cannula  O2 Flow (L/min): 4                            11.7   9.49  )-----------( 282      ( 14 Oct 2022 04:02 )             38.8    10-14    136  |  96<L>  |  15  ----------------------------<  180<H>  4.6   |  32<H>  |  1.06    Ca    9.0      14 Oct 2022 04:02  Phos  5.4     10-14  Mg     2.30     10-14     PT/INR - ( 13 Oct 2022 06:00 )   PT: 11.5 sec;   INR: 0.99 ratio         PTT - ( 13 Oct 2022 06:00 )  PTT:29.7 sec    PHYSICAL EXAM:  Gen: NAD, laying in bed with NC in place   Skin: No rashes, bruises, or lesions  Head: Normocephalic, Atraumatic  Face: no edema, erythema, or fluctuance.    Eyes: no scleral injection  Ears: Right -  external ear without abnormalities             Left - external ear without abnormalities   Nose: Nares bilaterally patent, no discharge  Mouth: No stridor, no drooling, no trismus.  small granulated scar noted on left lateral aspect of tongue. Mucosa moist, tongue/uvula midline, oropharynx clear  Neck: Flat, supple,trachea midline   Resp:  no stridor  GI: nondistended               Fiberoptic Indirect laryngoscopy:      Flexible laryngoscopy was performed and revealed the following:    -- Nasopharynx had no mass or exudate, no bleeding no scabs     -- Posterior pharyngeal wall clear, no edema, and no erythema, no bleeding     -- Base of tongue was symmetric and not enlarged, no pooling of secretions no bleeding     -- Vallecula was clear    -- Epiglottis, both aryepiglottic folds and both false vocal folds were normal    -- Arytenoids both without edema and erythema     -- True vocal folds were fully mobile and without lesions.     -- Post cricoid area clear, no edema and no erythema    -- Interarytenoid edema was absent    -- Airway patent

## 2022-10-14 NOTE — CONSULT NOTE ADULT - ASSESSMENT
72yo Female with MHx aneurysm, HTN, DM Type 2, HLD, h/o Covid-19  (residual symptoms, home oxygen intermittently, NC 2L/min PRN, diagnosed with chronic lung disease 2/2 covid-19) p/w coughing up blood about two episodes prior to admission.  ENT consulted for hemoptysis. Physical exam and FOE did not reveal a source of bleeding. Pt c/o 10/10 headache.

## 2022-10-14 NOTE — PROGRESS NOTE ADULT - SUBJECTIVE AND OBJECTIVE BOX
Date of Service: 10-14-22 @ 12:54    Patient is a 71y old  Female who presents with a chief complaint of Hemoptysis (14 Oct 2022 12:44)      Any change in ROS: s/p bronchoscopy: no bleeding noted: [facility]     MEDICATIONS  (STANDING):  atorvastatin 40 milliGRAM(s) Oral at bedtime  budesonide 160 MICROgram(s)/formoterol 4.5 MICROgram(s) Inhaler 2 Puff(s) Inhalation two times a day  dextrose 5%. 1000 milliLiter(s) (100 mL/Hr) IV Continuous <Continuous>  dextrose 5%. 1000 milliLiter(s) (50 mL/Hr) IV Continuous <Continuous>  dextrose 50% Injectable 25 Gram(s) IV Push once  dextrose 50% Injectable 12.5 Gram(s) IV Push once  dextrose 50% Injectable 25 Gram(s) IV Push once  glucagon  Injectable 1 milliGRAM(s) IntraMuscular once  hydrALAZINE 25 milliGRAM(s) Oral three times a day  influenza  Vaccine (HIGH DOSE) 0.7 milliLiter(s) IntraMuscular once  insulin glargine Injectable (LANTUS) 32 Unit(s) SubCutaneous every morning  insulin lispro (ADMELOG) corrective regimen sliding scale   SubCutaneous three times a day before meals  insulin lispro (ADMELOG) corrective regimen sliding scale   SubCutaneous at bedtime  insulin lispro Injectable (ADMELOG) 10 Unit(s) SubCutaneous three times a day before meals  losartan 100 milliGRAM(s) Oral daily  metoprolol tartrate 25 milliGRAM(s) Oral two times a day  multivitamin 1 Tablet(s) Oral daily    MEDICATIONS  (PRN):  acetaminophen     Tablet .. 650 milliGRAM(s) Oral every 6 hours PRN Temp greater or equal to 38C (100.4F), Mild Pain (1 - 3)  ALBUTerol    90 MICROgram(s) HFA Inhaler 1 Puff(s) Inhalation every 6 hours PRN Shortness of Breath  dextrose Oral Gel 15 Gram(s) Oral once PRN Blood Glucose LESS THAN 70 milliGRAM(s)/deciliter  melatonin 3 milliGRAM(s) Oral at bedtime PRN Insomnia  ondansetron Injectable 4 milliGRAM(s) IV Push every 8 hours PRN Nausea and/or Vomiting    Vital Signs Last 24 Hrs  T(C): 36.9 (14 Oct 2022 09:55), Max: 37.2 (14 Oct 2022 05:55)  T(F): 98.5 (14 Oct 2022 09:55), Max: 98.9 (14 Oct 2022 05:55)  HR: 86 (14 Oct 2022 12:45) (79 - 109)  BP: 124/65 (14 Oct 2022 12:45) (120/69 - 151/95)  BP(mean): 87 (13 Oct 2022 16:00) (78 - 99)  RR: 18 (14 Oct 2022 12:45) (12 - 24)  SpO2: 97% (14 Oct 2022 12:45) (95% - 100%)    Parameters below as of 14 Oct 2022 12:45  Patient On (Oxygen Delivery Method): nasal cannula  O2 Flow (L/min): 4      I&O's Summary    13 Oct 2022 07:01  -  14 Oct 2022 07:00  --------------------------------------------------------  IN: 1250 mL / OUT: 1000 mL / NET: 250 mL          Physical Exam:   GENERAL: NAD, well-groomed, well-developed  HEENT: NORMA/   Atraumatic, Normocephalic  ENMT: No tonsillar erythema, exudates, or enlargement; Moist mucous membranes, Good dentition, No lesions  NECK: Supple, No JVD, Normal thyroid  CHEST/LUNG: Clear to auscultaion  CVS: Regular rate and rhythm; No murmurs, rubs, or gallops  GI: : Soft, Nontender, Nondistended; Bowel sounds present  NERVOUS SYSTEM:  Alert & Oriented X3  EXTREMITIES:  2+ Peripheral Pulses, No clubbing, cyanosis, or edema  LYMPH: No lymphadenopathy noted  SKIN: No rashes or lesions  ENDOCRINOLOGY: No Thyromegaly  PSYCH: Appropriate    Labs:  32                            11.7   9.49  )-----------( 282      ( 14 Oct 2022 04:02 )             38.8                         12.0   8.54  )-----------( 291      ( 13 Oct 2022 06:00 )             39.0                         12.9   12.20 )-----------( 320      ( 12 Oct 2022 05:45 )             42.6                         11.8   11.03 )-----------( 261      ( 11 Oct 2022 07:14 )             37.9                         11.7   8.17  )-----------( 262      ( 10 Oct 2022 18:50 )             38.3     10-14    136  |  96<L>  |  15  ----------------------------<  180<H>  4.6   |  32<H>  |  1.06  10-13    140  |  98  |  13  ----------------------------<  111<H>  3.7   |  30  |  0.81  10-12    140  |  98  |  14  ----------------------------<  111<H>  3.9   |  32<H>  |  0.92  10-11    138  |  97<L>  |  12  ----------------------------<  154<H>  3.3<L>   |  30  |  0.79  10-10    140  |  101  |  14  ----------------------------<  184<H>  3.5   |  29  |  0.87    Ca    9.0      14 Oct 2022 04:02  Ca    9.0      13 Oct 2022 06:00  Phos  5.4     10-14  Phos  4.2     10-13  Mg     2.30     10-14  Mg     2.10     10-13    TPro  7.3  /  Alb  4.0  /  TBili  0.5  /  DBili  x   /  AST  24  /  ALT  19  /  AlkPhos  105  10-12  TPro  6.6  /  Alb  3.8  /  TBili  0.3  /  DBili  x   /  AST  20  /  ALT  19  /  AlkPhos  97  10-10    CAPILLARY BLOOD GLUCOSE      POCT Blood Glucose.: 131 mg/dL (14 Oct 2022 11:54)  POCT Blood Glucose.: 144 mg/dL (14 Oct 2022 07:39)  POCT Blood Glucose.: 145 mg/dL (13 Oct 2022 20:57)  POCT Blood Glucose.: 146 mg/dL (13 Oct 2022 16:59)  POCT Blood Glucose.: 136 mg/dL (13 Oct 2022 16:12)  POCT Blood Glucose.: 120 mg/dL (13 Oct 2022 14:02)        PT/INR - ( 13 Oct 2022 06:00 )   PT: 11.5 sec;   INR: 0.99 ratio    rad< from: CT Angio Chest PE Protocol w/ IV Cont (10.10.22 @ 21:46) >  ABDOMINAL WALL: Small fat-containing umbilicalhernia.  BONES: Degenerative changes.    IMPRESSION:  Cardiomegaly and moderate pulmonary edema.    No main, right main, left main, lobar or segmental pulmonary embolism.   Limited evaluation of subsegmental pulmonary arteries secondary to motion   and mixing artifact..        --- End of Report ---          LIZZ NATHAN MD; Resident Radiologist  This document has been electronically signed.  NICHOLAS RITCHIE MD; Attending Radiologist    < end of copied text >       PTT - ( 13 Oct 2022 06:00 )  PTT:29.7 sec          RECENT CULTURES:        RESPIRATORY CULTURES:          Studies  Chest X-RAY  CT SCAN Chest   Venous Dopplers: LE:   CT Abdomen  Others

## 2022-10-14 NOTE — PHYSICAL THERAPY INITIAL EVALUATION ADULT - ADDITIONAL COMMENTS
Pt owns a cane, shower chair, wheelchair, and rolling walker. Patient uses home 02 2 L/min via NC. Pt lives in a private home with 6 steps with bilateral railing to enter and then can remain on first floor.

## 2022-10-15 LAB
ANION GAP SERPL CALC-SCNC: 9 MMOL/L — SIGNIFICANT CHANGE UP (ref 7–14)
APPEARANCE UR: CLEAR — SIGNIFICANT CHANGE UP
B PERT DNA SPEC QL NAA+PROBE: SIGNIFICANT CHANGE UP
B PERT+PARAPERT DNA PNL SPEC NAA+PROBE: SIGNIFICANT CHANGE UP
BACTERIA # UR AUTO: ABNORMAL
BASOPHILS # BLD AUTO: 0.02 K/UL — SIGNIFICANT CHANGE UP (ref 0–0.2)
BASOPHILS NFR BLD AUTO: 0.2 % — SIGNIFICANT CHANGE UP (ref 0–2)
BILIRUB UR-MCNC: NEGATIVE — SIGNIFICANT CHANGE UP
BORDETELLA PARAPERTUSSIS (RAPRVP): SIGNIFICANT CHANGE UP
BUN SERPL-MCNC: 15 MG/DL — SIGNIFICANT CHANGE UP (ref 7–23)
C PNEUM DNA SPEC QL NAA+PROBE: SIGNIFICANT CHANGE UP
CALCIUM SERPL-MCNC: 8.7 MG/DL — SIGNIFICANT CHANGE UP (ref 8.4–10.5)
CHLORIDE SERPL-SCNC: 95 MMOL/L — LOW (ref 98–107)
CO2 SERPL-SCNC: 30 MMOL/L — SIGNIFICANT CHANGE UP (ref 22–31)
COLOR SPEC: SIGNIFICANT CHANGE UP
CREAT SERPL-MCNC: 0.94 MG/DL — SIGNIFICANT CHANGE UP (ref 0.5–1.3)
DIFF PNL FLD: NEGATIVE — SIGNIFICANT CHANGE UP
EGFR: 65 ML/MIN/1.73M2 — SIGNIFICANT CHANGE UP
EOSINOPHIL # BLD AUTO: 0.16 K/UL — SIGNIFICANT CHANGE UP (ref 0–0.5)
EOSINOPHIL NFR BLD AUTO: 1.7 % — SIGNIFICANT CHANGE UP (ref 0–6)
EPI CELLS # UR: 1 /HPF — SIGNIFICANT CHANGE UP (ref 0–5)
FLUAV SUBTYP SPEC NAA+PROBE: SIGNIFICANT CHANGE UP
FLUBV RNA SPEC QL NAA+PROBE: SIGNIFICANT CHANGE UP
GLUCOSE BLDC GLUCOMTR-MCNC: 129 MG/DL — HIGH (ref 70–99)
GLUCOSE BLDC GLUCOMTR-MCNC: 133 MG/DL — HIGH (ref 70–99)
GLUCOSE BLDC GLUCOMTR-MCNC: 161 MG/DL — HIGH (ref 70–99)
GLUCOSE SERPL-MCNC: 124 MG/DL — HIGH (ref 70–99)
GLUCOSE UR QL: NEGATIVE — SIGNIFICANT CHANGE UP
HADV DNA SPEC QL NAA+PROBE: SIGNIFICANT CHANGE UP
HCOV 229E RNA SPEC QL NAA+PROBE: SIGNIFICANT CHANGE UP
HCOV HKU1 RNA SPEC QL NAA+PROBE: SIGNIFICANT CHANGE UP
HCOV NL63 RNA SPEC QL NAA+PROBE: SIGNIFICANT CHANGE UP
HCOV OC43 RNA SPEC QL NAA+PROBE: SIGNIFICANT CHANGE UP
HCT VFR BLD CALC: 37.4 % — SIGNIFICANT CHANGE UP (ref 34.5–45)
HCT VFR BLD CALC: 40.1 % — SIGNIFICANT CHANGE UP (ref 34.5–45)
HGB BLD-MCNC: 11.4 G/DL — LOW (ref 11.5–15.5)
HGB BLD-MCNC: 12.2 G/DL — SIGNIFICANT CHANGE UP (ref 11.5–15.5)
HMPV RNA SPEC QL NAA+PROBE: SIGNIFICANT CHANGE UP
HPIV1 RNA SPEC QL NAA+PROBE: SIGNIFICANT CHANGE UP
HPIV2 RNA SPEC QL NAA+PROBE: SIGNIFICANT CHANGE UP
HPIV3 RNA SPEC QL NAA+PROBE: SIGNIFICANT CHANGE UP
HPIV4 RNA SPEC QL NAA+PROBE: SIGNIFICANT CHANGE UP
HYALINE CASTS # UR AUTO: 0 /LPF — SIGNIFICANT CHANGE UP (ref 0–7)
IANC: 6.98 K/UL — SIGNIFICANT CHANGE UP (ref 1.8–7.4)
IMM GRANULOCYTES NFR BLD AUTO: 0.5 % — SIGNIFICANT CHANGE UP (ref 0–0.9)
KETONES UR-MCNC: NEGATIVE — SIGNIFICANT CHANGE UP
LACTATE SERPL-SCNC: 1.1 MMOL/L — SIGNIFICANT CHANGE UP (ref 0.5–2)
LEUKOCYTE ESTERASE UR-ACNC: ABNORMAL
LYMPHOCYTES # BLD AUTO: 1.52 K/UL — SIGNIFICANT CHANGE UP (ref 1–3.3)
LYMPHOCYTES # BLD AUTO: 16.2 % — SIGNIFICANT CHANGE UP (ref 13–44)
M PNEUMO DNA SPEC QL NAA+PROBE: SIGNIFICANT CHANGE UP
MAGNESIUM SERPL-MCNC: 2.1 MG/DL — SIGNIFICANT CHANGE UP (ref 1.6–2.6)
MCHC RBC-ENTMCNC: 27.3 PG — SIGNIFICANT CHANGE UP (ref 27–34)
MCHC RBC-ENTMCNC: 27.4 PG — SIGNIFICANT CHANGE UP (ref 27–34)
MCHC RBC-ENTMCNC: 30.4 GM/DL — LOW (ref 32–36)
MCHC RBC-ENTMCNC: 30.5 GM/DL — LOW (ref 32–36)
MCV RBC AUTO: 89.5 FL — SIGNIFICANT CHANGE UP (ref 80–100)
MCV RBC AUTO: 89.9 FL — SIGNIFICANT CHANGE UP (ref 80–100)
MONOCYTES # BLD AUTO: 0.66 K/UL — SIGNIFICANT CHANGE UP (ref 0–0.9)
MONOCYTES NFR BLD AUTO: 7 % — SIGNIFICANT CHANGE UP (ref 2–14)
NEUTROPHILS # BLD AUTO: 6.98 K/UL — SIGNIFICANT CHANGE UP (ref 1.8–7.4)
NEUTROPHILS NFR BLD AUTO: 74.4 % — SIGNIFICANT CHANGE UP (ref 43–77)
NITRITE UR-MCNC: NEGATIVE — SIGNIFICANT CHANGE UP
NRBC # BLD: 0 /100 WBCS — SIGNIFICANT CHANGE UP (ref 0–0)
NRBC # BLD: 0 /100 WBCS — SIGNIFICANT CHANGE UP (ref 0–0)
NRBC # FLD: 0 K/UL — SIGNIFICANT CHANGE UP (ref 0–0)
NRBC # FLD: 0 K/UL — SIGNIFICANT CHANGE UP (ref 0–0)
PH UR: 6 — SIGNIFICANT CHANGE UP (ref 5–8)
PHOSPHATE SERPL-MCNC: 3.8 MG/DL — SIGNIFICANT CHANGE UP (ref 2.5–4.5)
PLATELET # BLD AUTO: 257 K/UL — SIGNIFICANT CHANGE UP (ref 150–400)
PLATELET # BLD AUTO: 265 K/UL — SIGNIFICANT CHANGE UP (ref 150–400)
POTASSIUM SERPL-MCNC: 4.2 MMOL/L — SIGNIFICANT CHANGE UP (ref 3.5–5.3)
POTASSIUM SERPL-SCNC: 4.2 MMOL/L — SIGNIFICANT CHANGE UP (ref 3.5–5.3)
PROCALCITONIN SERPL-MCNC: 0.04 NG/ML — SIGNIFICANT CHANGE UP (ref 0.02–0.1)
PROT UR-MCNC: ABNORMAL
RAPID RVP RESULT: SIGNIFICANT CHANGE UP
RBC # BLD: 4.18 M/UL — SIGNIFICANT CHANGE UP (ref 3.8–5.2)
RBC # BLD: 4.46 M/UL — SIGNIFICANT CHANGE UP (ref 3.8–5.2)
RBC # FLD: 14.4 % — SIGNIFICANT CHANGE UP (ref 10.3–14.5)
RBC # FLD: 14.5 % — SIGNIFICANT CHANGE UP (ref 10.3–14.5)
RBC CASTS # UR COMP ASSIST: 0 /HPF — SIGNIFICANT CHANGE UP (ref 0–4)
RSV RNA SPEC QL NAA+PROBE: SIGNIFICANT CHANGE UP
RV+EV RNA SPEC QL NAA+PROBE: SIGNIFICANT CHANGE UP
SARS-COV-2 RNA SPEC QL NAA+PROBE: SIGNIFICANT CHANGE UP
SODIUM SERPL-SCNC: 134 MMOL/L — LOW (ref 135–145)
SP GR SPEC: 1.01 — LOW (ref 1.01–1.05)
UROBILINOGEN FLD QL: SIGNIFICANT CHANGE UP
WBC # BLD: 9.39 K/UL — SIGNIFICANT CHANGE UP (ref 3.8–10.5)
WBC # BLD: 9.54 K/UL — SIGNIFICANT CHANGE UP (ref 3.8–10.5)
WBC # FLD AUTO: 9.39 K/UL — SIGNIFICANT CHANGE UP (ref 3.8–10.5)
WBC # FLD AUTO: 9.54 K/UL — SIGNIFICANT CHANGE UP (ref 3.8–10.5)
WBC UR QL: 69 /HPF — HIGH (ref 0–5)

## 2022-10-15 PROCEDURE — 99233 SBSQ HOSP IP/OBS HIGH 50: CPT

## 2022-10-15 PROCEDURE — 70498 CT ANGIOGRAPHY NECK: CPT | Mod: 26

## 2022-10-15 PROCEDURE — 70496 CT ANGIOGRAPHY HEAD: CPT | Mod: 26

## 2022-10-15 PROCEDURE — 99232 SBSQ HOSP IP/OBS MODERATE 35: CPT | Mod: GC

## 2022-10-15 RX ORDER — CEFEPIME 1 G/1
INJECTION, POWDER, FOR SOLUTION INTRAMUSCULAR; INTRAVENOUS
Refills: 0 | Status: DISCONTINUED | OUTPATIENT
Start: 2022-10-15 | End: 2022-10-17

## 2022-10-15 RX ORDER — HEPARIN SODIUM 5000 [USP'U]/ML
5000 INJECTION INTRAVENOUS; SUBCUTANEOUS EVERY 12 HOURS
Refills: 0 | Status: DISCONTINUED | OUTPATIENT
Start: 2022-10-15 | End: 2022-10-18

## 2022-10-15 RX ORDER — ASPIRIN/CALCIUM CARB/MAGNESIUM 324 MG
81 TABLET ORAL DAILY
Refills: 0 | Status: DISCONTINUED | OUTPATIENT
Start: 2022-10-15 | End: 2022-10-18

## 2022-10-15 RX ORDER — NIFEDIPINE 30 MG
30 TABLET, EXTENDED RELEASE 24 HR ORAL DAILY
Refills: 0 | Status: DISCONTINUED | OUTPATIENT
Start: 2022-10-15 | End: 2022-10-18

## 2022-10-15 RX ORDER — CEFEPIME 1 G/1
2000 INJECTION, POWDER, FOR SOLUTION INTRAMUSCULAR; INTRAVENOUS ONCE
Refills: 0 | Status: COMPLETED | OUTPATIENT
Start: 2022-10-15 | End: 2022-10-15

## 2022-10-15 RX ORDER — IBUPROFEN 200 MG
400 TABLET ORAL ONCE
Refills: 0 | Status: COMPLETED | OUTPATIENT
Start: 2022-10-15 | End: 2022-10-15

## 2022-10-15 RX ORDER — CEFEPIME 1 G/1
2000 INJECTION, POWDER, FOR SOLUTION INTRAMUSCULAR; INTRAVENOUS EVERY 8 HOURS
Refills: 0 | Status: DISCONTINUED | OUTPATIENT
Start: 2022-10-15 | End: 2022-10-17

## 2022-10-15 RX ORDER — CIPROFLOXACIN LACTATE 400MG/40ML
500 VIAL (ML) INTRAVENOUS DAILY
Refills: 0 | Status: DISCONTINUED | OUTPATIENT
Start: 2022-10-15 | End: 2022-10-15

## 2022-10-15 RX ADMIN — Medication 1 TABLET(S): at 12:33

## 2022-10-15 RX ADMIN — Medication 1000 MILLIGRAM(S): at 01:30

## 2022-10-15 RX ADMIN — Medication 10 UNIT(S): at 17:15

## 2022-10-15 RX ADMIN — Medication 25 MILLIGRAM(S): at 17:15

## 2022-10-15 RX ADMIN — Medication 2: at 12:29

## 2022-10-15 RX ADMIN — LOSARTAN POTASSIUM 100 MILLIGRAM(S): 100 TABLET, FILM COATED ORAL at 05:23

## 2022-10-15 RX ADMIN — Medication 25 MILLIGRAM(S): at 02:57

## 2022-10-15 RX ADMIN — HEPARIN SODIUM 5000 UNIT(S): 5000 INJECTION INTRAVENOUS; SUBCUTANEOUS at 17:29

## 2022-10-15 RX ADMIN — INSULIN GLARGINE 32 UNIT(S): 100 INJECTION, SOLUTION SUBCUTANEOUS at 08:41

## 2022-10-15 RX ADMIN — Medication 650 MILLIGRAM(S): at 17:42

## 2022-10-15 RX ADMIN — Medication 81 MILLIGRAM(S): at 17:43

## 2022-10-15 RX ADMIN — ATORVASTATIN CALCIUM 40 MILLIGRAM(S): 80 TABLET, FILM COATED ORAL at 21:52

## 2022-10-15 RX ADMIN — BUDESONIDE AND FORMOTEROL FUMARATE DIHYDRATE 2 PUFF(S): 160; 4.5 AEROSOL RESPIRATORY (INHALATION) at 08:44

## 2022-10-15 RX ADMIN — CEFEPIME 100 MILLIGRAM(S): 1 INJECTION, POWDER, FOR SOLUTION INTRAMUSCULAR; INTRAVENOUS at 21:53

## 2022-10-15 RX ADMIN — Medication 10 MILLIGRAM(S): at 17:16

## 2022-10-15 RX ADMIN — Medication 10 UNIT(S): at 08:40

## 2022-10-15 RX ADMIN — Medication 10 MILLIGRAM(S): at 02:57

## 2022-10-15 RX ADMIN — Medication 400 MILLIGRAM(S): at 20:00

## 2022-10-15 RX ADMIN — Medication 30 MILLIGRAM(S): at 22:50

## 2022-10-15 RX ADMIN — Medication 25 MILLIGRAM(S): at 17:16

## 2022-10-15 RX ADMIN — Medication 650 MILLIGRAM(S): at 07:37

## 2022-10-15 RX ADMIN — Medication 650 MILLIGRAM(S): at 20:00

## 2022-10-15 RX ADMIN — Medication 10 UNIT(S): at 12:29

## 2022-10-15 RX ADMIN — Medication 400 MILLIGRAM(S): at 19:32

## 2022-10-15 RX ADMIN — Medication 25 MILLIGRAM(S): at 05:23

## 2022-10-15 RX ADMIN — BUDESONIDE AND FORMOTEROL FUMARATE DIHYDRATE 2 PUFF(S): 160; 4.5 AEROSOL RESPIRATORY (INHALATION) at 21:30

## 2022-10-15 RX ADMIN — Medication 10 MILLIGRAM(S): at 12:31

## 2022-10-15 RX ADMIN — Medication 650 MILLIGRAM(S): at 05:29

## 2022-10-15 RX ADMIN — Medication 25 MILLIGRAM(S): at 12:32

## 2022-10-15 RX ADMIN — CEFEPIME 100 MILLIGRAM(S): 1 INJECTION, POWDER, FOR SOLUTION INTRAMUSCULAR; INTRAVENOUS at 17:43

## 2022-10-15 RX ADMIN — DIVALPROEX SODIUM 250 MILLIGRAM(S): 500 TABLET, DELAYED RELEASE ORAL at 17:16

## 2022-10-15 NOTE — PROGRESS NOTE ADULT - SUBJECTIVE AND OBJECTIVE BOX
[facility]      Seen earlier today Date of service   No new neurological symptoms reported. Remains stable neurologically.   - Today, patient is without complaints.          *****MEDICATIONS: Current medication reviewed and documented.    MEDICATIONS  (STANDING):  atorvastatin 40 milliGRAM(s) Oral at bedtime  budesonide 160 MICROgram(s)/formoterol 4.5 MICROgram(s) Inhaler 2 Puff(s) Inhalation two times a day  dextrose 5%. 1000 milliLiter(s) (50 mL/Hr) IV Continuous <Continuous>  dextrose 5%. 1000 milliLiter(s) (100 mL/Hr) IV Continuous <Continuous>  dextrose 50% Injectable 25 Gram(s) IV Push once  dextrose 50% Injectable 12.5 Gram(s) IV Push once  dextrose 50% Injectable 25 Gram(s) IV Push once  diVALproex  milliGRAM(s) Oral <User Schedule>  glucagon  Injectable 1 milliGRAM(s) IntraMuscular once  heparin   Injectable 5000 Unit(s) SubCutaneous every 12 hours  hydrALAZINE 25 milliGRAM(s) Oral three times a day  influenza  Vaccine (HIGH DOSE) 0.7 milliLiter(s) IntraMuscular once  insulin glargine Injectable (LANTUS) 32 Unit(s) SubCutaneous every morning  insulin lispro (ADMELOG) corrective regimen sliding scale   SubCutaneous three times a day before meals  insulin lispro (ADMELOG) corrective regimen sliding scale   SubCutaneous at bedtime  insulin lispro Injectable (ADMELOG) 10 Unit(s) SubCutaneous three times a day before meals  losartan 100 milliGRAM(s) Oral daily  metoclopramide Injectable 10 milliGRAM(s) IV Push three times a day  metoprolol tartrate 25 milliGRAM(s) Oral two times a day  multivitamin 1 Tablet(s) Oral daily    MEDICATIONS  (PRN):  acetaminophen     Tablet .. 650 milliGRAM(s) Oral every 6 hours PRN Temp greater or equal to 38C (100.4F), Mild Pain (1 - 3)  ALBUTerol    90 MICROgram(s) HFA Inhaler 1 Puff(s) Inhalation every 6 hours PRN Shortness of Breath  cyclobenzaprine 5 milliGRAM(s) Oral three times a day PRN Muscle Spasm  dextrose Oral Gel 15 Gram(s) Oral once PRN Blood Glucose LESS THAN 70 milliGRAM(s)/deciliter  melatonin 3 milliGRAM(s) Oral at bedtime PRN Insomnia  ondansetron Injectable 4 milliGRAM(s) IV Push every 8 hours PRN Nausea and/or Vomiting          ***** VITAL SIGNS:   Vital Signs Last 24 Hrs       T(F): 98.2 (10-15-22 @ 12:13), Max: 102.2 (10-14-22 @ 22:35)  HR: 88 (10-15-22 @ 12:13) (79 - 98)  BP: 159/96 (10-15-22 @ 12:13) (125/70 - 178/96)  RR: 18 (10-15-22 @ 12:13) (18 - 20)  SpO2: 96% (10-15-22 @ 12:13) (91% - 98%)  Wt(kg): --  I&O's Summary    14 Oct 2022 07:01  -  15 Oct 2022 07:00  --------------------------------------------------------  IN: 200 mL / OUT: 1500 mL / NET: -1300 mL             *****PHYSICAL EXAM:   alert oriented x 3 attention comprehension are fair.  Able to name, repeat.   EOmi fundi not visualized   no nystagmus VFF to confrontation  Tongue is midline  Palate elevates symmetrically   Moving all 4 ext spontaneously no drift appreciated    Gait not assessed.            *****LAB AND IMAGIN.2   9.54  )-----------( 265      ( 15 Oct 2022 06:42 )             40.1               10-15    134<L>  |  95<L>  |  15  ----------------------------<  124<H>  4.2   |  30  |  0.94    Ca    8.7      15 Oct 2022 06:42  Phos  3.8     10-15  Mg     2.10     10-15                         Urinalysis Basic - ( 15 Oct 2022 05:00 )    Color: Light Yellow / Appearance: Clear / S.009 / pH: x  Gluc: x / Ketone: Negative  / Bili: Negative / Urobili: <2 mg/dL   Blood: x / Protein: Trace / Nitrite: Negative   Leuk Esterase: Large / RBC: 0 /HPF / WBC 69 /HPF   Sq Epi: x / Non Sq Epi: 1 /HPF / Bacteria: Few      [All pertinent recent Imaging/Reports reviewed]            *****A S S E S S M E N T   A N D   P L A N :       Thank you for allowing me to participate in the care of this patient. Will continue to follow patient periodically. Please do not hesitate to call me if you have any  questions or if there has been a change in patients neurological status     ________________  Angelica Jewell MD  [facility] ([facility]  [phone]      33 minutes spent on total encounter; more than 50 % of the visit was  spent counseling about plan of care, compliance to diet/exercise and medication regimen and or  coordinating care by the attending physician.      It is advised that stroke patients follow up with ANGELO Crowell @ [phone] in 1- 2 weeks.   Others please follow up with Dr. Michael Nissenbaum [phone] [facility]      Seen earlier today Date of service   No new neurological symptoms reported. Remains stable neurologically.   - Today, patient is without complaints.          *****MEDICATIONS: Current medication reviewed and documented.    MEDICATIONS  (STANDING):  atorvastatin 40 milliGRAM(s) Oral at bedtime  budesonide 160 MICROgram(s)/formoterol 4.5 MICROgram(s) Inhaler 2 Puff(s) Inhalation two times a day  dextrose 5%. 1000 milliLiter(s) (50 mL/Hr) IV Continuous <Continuous>  dextrose 5%. 1000 milliLiter(s) (100 mL/Hr) IV Continuous <Continuous>  dextrose 50% Injectable 25 Gram(s) IV Push once  dextrose 50% Injectable 12.5 Gram(s) IV Push once  dextrose 50% Injectable 25 Gram(s) IV Push once  diVALproex  milliGRAM(s) Oral <User Schedule>  glucagon  Injectable 1 milliGRAM(s) IntraMuscular once  heparin   Injectable 5000 Unit(s) SubCutaneous every 12 hours  hydrALAZINE 25 milliGRAM(s) Oral three times a day  influenza  Vaccine (HIGH DOSE) 0.7 milliLiter(s) IntraMuscular once  insulin glargine Injectable (LANTUS) 32 Unit(s) SubCutaneous every morning  insulin lispro (ADMELOG) corrective regimen sliding scale   SubCutaneous three times a day before meals  insulin lispro (ADMELOG) corrective regimen sliding scale   SubCutaneous at bedtime  insulin lispro Injectable (ADMELOG) 10 Unit(s) SubCutaneous three times a day before meals  losartan 100 milliGRAM(s) Oral daily  metoclopramide Injectable 10 milliGRAM(s) IV Push three times a day  metoprolol tartrate 25 milliGRAM(s) Oral two times a day  multivitamin 1 Tablet(s) Oral daily    MEDICATIONS  (PRN):  acetaminophen     Tablet .. 650 milliGRAM(s) Oral every 6 hours PRN Temp greater or equal to 38C (100.4F), Mild Pain (1 - 3)  ALBUTerol    90 MICROgram(s) HFA Inhaler 1 Puff(s) Inhalation every 6 hours PRN Shortness of Breath  cyclobenzaprine 5 milliGRAM(s) Oral three times a day PRN Muscle Spasm  dextrose Oral Gel 15 Gram(s) Oral once PRN Blood Glucose LESS THAN 70 milliGRAM(s)/deciliter  melatonin 3 milliGRAM(s) Oral at bedtime PRN Insomnia  ondansetron Injectable 4 milliGRAM(s) IV Push every 8 hours PRN Nausea and/or Vomiting          ***** VITAL SIGNS:   Vital Signs Last 24 Hrs       T(F): 98.2 (10-15-22 @ 12:13), Max: 102.2 (10-14-22 @ 22:35)  HR: 88 (10-15-22 @ 12:13) (79 - 98)  BP: 159/96 (10-15-22 @ 12:13) (125/70 - 178/96)  RR: 18 (10-15-22 @ 12:13) (18 - 20)  SpO2: 96% (10-15-22 @ 12:13) (91% - 98%)  Wt(kg): --  I&O's Summary    14 Oct 2022 07:01  -  15 Oct 2022 07:00  --------------------------------------------------------  IN: 200 mL / OUT: 1500 mL / NET: -1300 mL             *****PHYSICAL EXAM:   alert oriented x 3 attention comprehension are fair.  Able to name, repeat.   EOmi fundi not visualized   no nystagmus VFF to confrontation  Tongue is midline   Moving all 4 ext spontaneously no drift appreciated    Gait not assessed.            *****LAB AND IMAGIN.2   9.54  )-----------( 265      ( 15 Oct 2022 06:42 )             40.1               10-15    134<L>  |  95<L>  |  15  ----------------------------<  124<H>  4.2   |  30  |  0.94    Ca    8.7      15 Oct 2022 06:42  Phos  3.8     10-15  Mg     2.10     10-15  < from: CT Angio Head w/ IV Cont (10.15.22 @ 11:24) >  CT BRAIN:  Acute right cerebellar infarct suspected as described above.    CT ANGIOGRAPHY NECK:  No evidence of hemodynamically significant stenosis using NASCET   criteria. Patent vertebral arteries. No evidence of vascular dissection.    CT ANGIOGRAPHY BRAIN:  No major vessel occlusion or proximal stenosis.    < end of copied text >                         Urinalysis Basic - ( 15 Oct 2022 05:00 )    Color: Light Yellow / Appearance: Clear / S.009 / pH: x  Gluc: x / Ketone: Negative  / Bili: Negative / Urobili: <2 mg/dL   Blood: x / Protein: Trace / Nitrite: Negative   Leuk Esterase: Large / RBC: 0 /HPF / WBC 69 /HPF   Sq Epi: x / Non Sq Epi: 1 /HPF / Bacteria: Few      [All pertinent recent Imaging/Reports reviewed]            *****A S S E S S M E N T   A N D   P L A N :   Excerpt from H&P,"     70yo Female with MHx aneurysm, HTN, DM Type 2, HLD, h/o Covid-19  (residual symptoms, home oxygen intermittently, NC 2L/min PRN, diagnosed with chronic lung disease 2/ covid-19) c/o coughing up bright red blood today x 2 episodes. Pt provides a white towel with several bloody stains (2x2cm) from home and a bottle with a blood clot (0.5x2cm in size).  No prior episodes like that in the past. Reports 3 weeks of coughing with worsening SOB. Also reports associated worsening bilateral lower extremity swelling. States has had no palpitations, CP, fevers, Abd pain, n/v/d; Of note, patient was treated with Azithromycin x 5 days by PCP with no relief. Reports no recent travel, no hx of TB, non-smoker. (11 Oct 2022 04:44)       Problem/Recommendations 1:  headache  r sided     bp goal normotensive gradually   hx of aneurysm   reglan iv      ct head with possible R sup cerebellar infarct  no large vessel occlusion noted   nih 0     Problem/Recommendations 2: coughing   now with hemoptysis   defer to primary team     Problem/Recommendations 3: fever   likely aspiration   on abx per iD       Thank you for allowing me to participate in the care of this patient. Will continue to follow patient periodically. Please do not hesitate to call me if you have any  questions or if there has been a change in patients neurological status     ________________  Angelica Jewell MD  [facility] ([facility]  [phone]      33 minutes spent on total encounter; more than 50 % of the visit was  spent counseling about plan of care, compliance to diet/exercise and medication regimen and or  coordinating care by the attending physician.      It is advised that stroke patients follow up with ANGELO Crowell @ [phone] in 1- 2 weeks.   Others please follow up with Dr. Michael Nissenbaum [phone]

## 2022-10-15 NOTE — CONSULT NOTE ADULT - SUBJECTIVE AND OBJECTIVE BOX
Patient is a 71y old  Female who presents with a chief complaint of Hemoptysis (14 Oct 2022 15:29)    HPI: Ms Leavitt is a 70 yo Female with PMHx aneurysm, HTN, DM Type 2, HLD, h/o Covid-19  (residual symptoms, home oxygen intermittently, NC 2L/min PRN, diagnosed with chronic lung disease 2/2 covid-19) who presented to the ED with c/o coughing up bright red blood x 2 episodes on 10/11. Reports 3 weeks of coughing with worsening SOB and associated worsening bilateral lower extremity swelling. States has had no palpitations, CP, fevers, Abd pain, n/v/d; Of note, patient was treated with Azithromycin x 5 days by PCP with no relief. Reports no recent travel, no hx of TB, non-smoker.    Here pt was afebrile with no leukocytosis. She was noted to have Pulmonary edema on CT scan, however ECHO was with normal biventricular function. SHe underwent bronchoscopy on 10/13 which noted no active bleeding or old blood in the airway. Pt also began to endorses HA 10/10 localized to her left temporal area on 10/13 and is planned for CTA heand and neck. ENT evaluvated the pt with Flexible laryngoscopy which was normal. Overnight yesterday pt developed chills and spiked a fever. ID consulted for the same.    REVIEW OF SYSTEMS  [  ] ROS unobtainable because:    [ x ] All other systems negative except as noted below    Constitutional:  [ ] fever [ ] chills  [ ] weight loss  [ ]night sweat  [ ]poor appetite/PO intake [ ]fatigue   Skin:  [ ] rash [ ] phlebitis	  Eyes: [ ] icterus [ ] pain  [ ] discharge	  ENMT: [ ] sore throat  [ ] thrush [ ] ulcers [ ] exudates [ ]anosmia  Respiratory: [ ] dyspnea [ ] hemoptysis [ ] cough [ ] sputum	  Cardiovascular:  [ ] chest pain [ ] palpitations [ ] edema	  Gastrointestinal:  [ ] nausea [ ] vomiting [ ] diarrhea [ ] constipation [ ] pain	  Genitourinary:  [ ] dysuria [ ] frequency [ ] hematuria [ ] discharge [ ] flank pain  [ ] incontinence  Musculoskeletal:  [ ] myalgias [ ] arthralgias [ ] arthritis  [ ] back pain  Neurological:  [ ] headache [ ] weakness [ ] seizures  [ ] confusion/altered mental status    prior hospital charts reviewed [V]  primary team notes reviewed [V]  other consultant notes reviewed [V]    PAST MEDICAL & SURGICAL HISTORY:  Diabetes mellitus      Hypertension      COVID-19      No significant past surgical history          SOCIAL HISTORY:  - Denied smoking/vaping/alcohol/recreational drug use    FAMILY HISTORY:  FH: lung cancer (Father)  smoker    FH: diabetes mellitus (Mother, Sibling, Grandparent)        Allergies  penicillin (Unknown)        ANTIMICROBIALS:      ANTIMICROBIALS (past 90 days):  MEDICATIONS  (STANDING):        OTHER MEDS:   MEDICATIONS  (STANDING):  acetaminophen     Tablet .. 650 every 6 hours PRN  ALBUTerol    90 MICROgram(s) HFA Inhaler 1 every 6 hours PRN  atorvastatin 40 at bedtime  budesonide 160 MICROgram(s)/formoterol 4.5 MICROgram(s) Inhaler 2 two times a day  cyclobenzaprine 5 three times a day PRN  dextrose 50% Injectable 25 once  dextrose 50% Injectable 12.5 once  dextrose 50% Injectable 25 once  dextrose Oral Gel 15 once PRN  diVALproex  <User Schedule>  glucagon  Injectable 1 once  heparin   Injectable 5000 every 12 hours  hydrALAZINE 25 three times a day  influenza  Vaccine (HIGH DOSE) 0.7 once  insulin glargine Injectable (LANTUS) 32 every morning  insulin lispro (ADMELOG) corrective regimen sliding scale  three times a day before meals  insulin lispro (ADMELOG) corrective regimen sliding scale  at bedtime  insulin lispro Injectable (ADMELOG) 10 three times a day before meals  losartan 100 daily  melatonin 3 at bedtime PRN  metoclopramide Injectable 10 three times a day  metoprolol tartrate 25 two times a day  ondansetron Injectable 4 every 8 hours PRN      VITALS:  Vital Signs Last 24 Hrs  T(F): 99 (10-15-22 @ 07:37), Max: 102.2 (10-14-22 @ 22:35)    Vital Signs Last 24 Hrs  HR: 82 (10-15-22 @ 08:12) (79 - 98)  BP: 152/93 (10-15-22 @ 05:20) (124/65 - 178/96)  RR: 20 (10-15-22 @ 05:20)  SpO2: 96% (10-15-22 @ 08:12) (91% - 98%)  Wt(kg): --    EXAM:    GA: NAD, AOx3  HEENT: oral cavity no lesion  CV: nl S1/S2, no RMG  Lungs: CTAB, No distress  Abd: BS+, soft, nontender, no rebounding pain  Ext: no edema  Neuro: No focal deficits  Skin: Intact  IV: no phlebitis    Labs:                        12.2   9.54  )-----------( 265      ( 15 Oct 2022 06:42 )             40.1     10-15    134<L>  |  95<L>  |  15  ----------------------------<  124<H>  4.2   |  30  |  0.94    Ca    8.7      15 Oct 2022 06:42  Phos  3.8     10-15  Mg     2.10     10-15        WBC Trend:  WBC Count: 9.54 (10-15-22 @ 06:42)  WBC Count: 9.39 (10-14-22 @ 23:30)  WBC Count: 9.49 (10-14-22 @ 04:02)  WBC Count: 8.54 (10-13-22 @ 06:00)      Auto Neutrophil #: 6.98 K/uL (10-14-22 @ 23:30)  Auto Neutrophil #: 8.14 K/uL (10-11-22 @ 07:14)  Auto Neutrophil #: 5.51 K/uL (10-10-22 @ 18:50)      Creatine Trend:  Creatinine, Serum: 0.94 (10-15)  Creatinine, Serum: 1.06 (10-)  Creatinine, Serum: 0.81 (10-)  Creatinine, Serum: 0.92 (10-12)      Liver Biochemical Testing Trend:  Alanine Aminotransferase (ALT/SGPT): 19 (10-)  Alanine Aminotransferase (ALT/SGPT): 19 (10-10)  Aspartate Aminotransferase (AST/SGOT): 24 (10-12-22 @ 05:45)  Aspartate Aminotransferase (AST/SGOT): 20 (10-10-22 @ 18:50)  Bilirubin Total, Serum: 0.5 (10-12)  Bilirubin Total, Serum: 0.3 (10-10)      Trend LDH      Auto Eosinophil %: 1.7 % (10-14-22 @ 23:30)      Urinalysis Basic - ( 15 Oct 2022 05:00 )    Color: Light Yellow / Appearance: Clear / S.009 / pH: x  Gluc: x / Ketone: Negative  / Bili: Negative / Urobili: <2 mg/dL   Blood: x / Protein: Trace / Nitrite: Negative   Leuk Esterase: Large / RBC: 0 /HPF / WBC 69 /HPF   Sq Epi: x / Non Sq Epi: 1 /HPF / Bacteria: Few        MICROBIOLOGY:    Rapid RVP Result: NotDetec (10-15 @ 00:27)  Rapid RVP Result: NotDetec (10-10 @ 19:09)  COVID-19 PCR: NotDetec (10-12-22 @ 16:30)  Procalcitonin, Serum: 0.04 (10-14)  Serum Pro-Brain Natriuretic Peptide: 1206 (10-10)  Troponin T, High Sensitivity Result: 24 (10-11)  Lactate, Blood: 1.1 (10-14 @ 23:30)  A1C with Estimated Average Glucose Result: 8.0 % (10-11-22 @ 07:14)      RADIOLOGY:  imaging below personally reviewed    < from: Xray Chest 1 View- PORTABLE-Routine (Xray Chest 1 View- PORTABLE-Routine in AM.) (10.14.22 @ 08:25) >  No pneumothorax. Questionable left infiltrate    < end of copied text >    < from: CT Angio Chest PE Protocol w/ IV Cont (10.10.22 @ 21:46) >  Cardiomegaly and moderate pulmonary edema.    No main, right main, left main, lobar or segmental pulmonary embolism.   Limited evaluation of subsegmental pulmonary arteries secondary to motion   and mixing artifact..      < end of copied text >      < from: Xray Chest 1 View AP/PA (10.10.22 @ 19:30) >  Prominent interstitial markings may represent pulmonary edema. No focal consolidations.  < end of copied text >   Patient is a 71y old  Female who presents with a chief complaint of Hemoptysis (14 Oct 2022 15:29)    HPI: Ms Leavitt is a 72 yo Female with PMHx aneurysm, HTN, DM Type 2, HLD, h/o Covid-19  (residual symptoms, home oxygen intermittently, NC 2L/min PRN, diagnosed with chronic lung disease 2/2 covid-19) who presented to the ED with c/o coughing up bright red blood x 2 episodes on 10/11. Reports 3 weeks of coughing with worsening SOB and associated worsening bilateral lower extremity swelling. States has had no palpitations, CP, fevers, Abd pain, n/v/d; Of note, patient was treated with Azithromycin x 5 days by PCP with no relief. Reports no recent travel, no hx of TB, non-smoker.    Here pt was afebrile with no leukocytosis. She was noted to have Pulmonary edema on CT scan, however ECHO was with normal biventricular function. SHe underwent bronchoscopy on 10/13 which noted no active bleeding or old blood in the airway. Pt also began to endorses HA 10/10 localized to her left temporal area on 10/13 and is planned for CTA head and neck. ENT evaluated the pt with Flexible laryngoscopy which was normal. Overnight yesterday pt developed chills and spiked a fever. ID consulted for the same.    Currently pt reports feeling much better. Reports her cough is at baseline, headache improving, no pain or swelling at IV sites, no dysuria    REVIEW OF SYSTEMS  [  ] ROS unobtainable because:    [ x ] All other systems negative except as noted below    Constitutional:  [ ] fever [ ] chills  [ ] weight loss  [ ]night sweat  [ ]poor appetite/PO intake [ ]fatigue   Skin:  [ ] rash [ ] phlebitis	  Eyes: [ ] icterus [ ] pain  [ ] discharge	  ENMT: [ ] sore throat  [ ] thrush [ ] ulcers [ ] exudates [ ]anosmia  Respiratory: [ ] dyspnea [ ] hemoptysis [ ] cough [ ] sputum	  Cardiovascular:  [ ] chest pain [ ] palpitations [ ] edema	  Gastrointestinal:  [ ] nausea [ ] vomiting [ ] diarrhea [ ] constipation [ ] pain	  Genitourinary:  [ ] dysuria [ ] frequency [ ] hematuria [ ] discharge [ ] flank pain  [ ] incontinence  Musculoskeletal:  [ ] myalgias [ ] arthralgias [ ] arthritis  [ ] back pain  Neurological:  [ ] headache [ ] weakness [ ] seizures  [ ] confusion/altered mental status    prior hospital charts reviewed [V]  primary team notes reviewed [V]  other consultant notes reviewed [V]    PAST MEDICAL & SURGICAL HISTORY:  Diabetes mellitus  Hypertension  COVID-19  No significant past surgical history    SOCIAL HISTORY:  - Denied smoking/vaping/alcohol/recreational drug use    FAMILY HISTORY:  FH: lung cancer (Father)  smoker  FH: diabetes mellitus (Mother, Sibling, Grandparent)    Allergies  penicillin (Unknown)    ANTIMICROBIALS:      ANTIMICROBIALS (past 90 days):  MEDICATIONS  (STANDING):        OTHER MEDS:   MEDICATIONS  (STANDING):  acetaminophen     Tablet .. 650 every 6 hours PRN  ALBUTerol    90 MICROgram(s) HFA Inhaler 1 every 6 hours PRN  atorvastatin 40 at bedtime  budesonide 160 MICROgram(s)/formoterol 4.5 MICROgram(s) Inhaler 2 two times a day  cyclobenzaprine 5 three times a day PRN  dextrose 50% Injectable 25 once  dextrose 50% Injectable 12.5 once  dextrose 50% Injectable 25 once  dextrose Oral Gel 15 once PRN  diVALproex  <User Schedule>  glucagon  Injectable 1 once  heparin   Injectable 5000 every 12 hours  hydrALAZINE 25 three times a day  influenza  Vaccine (HIGH DOSE) 0.7 once  insulin glargine Injectable (LANTUS) 32 every morning  insulin lispro (ADMELOG) corrective regimen sliding scale  three times a day before meals  insulin lispro (ADMELOG) corrective regimen sliding scale  at bedtime  insulin lispro Injectable (ADMELOG) 10 three times a day before meals  losartan 100 daily  melatonin 3 at bedtime PRN  metoclopramide Injectable 10 three times a day  metoprolol tartrate 25 two times a day  ondansetron Injectable 4 every 8 hours PRN      VITALS:  Vital Signs Last 24 Hrs  T(F): 99 (10-15-22 @ 07:37), Max: 102.2 (10-14-22 @ 22:35)    Vital Signs Last 24 Hrs  HR: 82 (10-15-22 @ 08:12) (79 - 98)  BP: 152/93 (10-15-22 @ 05:20) (124/65 - 178/96)  RR: 20 (10-15-22 @ 05:20)  SpO2: 96% (10-15-22 @ 08:12) (91% - 98%)  Wt(kg): --    EXAM:    Constitutional: Not in acute distress  Eyes: pupils bilaterally reactive to light. No icterus.  Oral cavity: Clear, no lesions  Neck: No neck vein distension noted  RS: B/L Crackles  CVS: S1, S2 heard. Regular rate and rhythm. No murmurs/rubs/gallops.  Abdomen: Soft, but distended. No guarding/rigidity/tenderness.  : No acute abnormalities  Extremities: Warm. No pedal edema  Skin: No lesions noted  Vascular: No evidence of phlebitis  Neuro: Alert, oriented to time/place/person  Cranial nerves 2-12 grossly normal. No focal abnormalities      Labs:                        12.2   9.54  )-----------( 265      ( 15 Oct 2022 06:42 )             40.1     10-15    134<L>  |  95<L>  |  15  ----------------------------<  124<H>  4.2   |  30  |  0.94    Ca    8.7      15 Oct 2022 06:42  Phos  3.8     10-15  Mg     2.10     10-15        WBC Trend:  WBC Count: 9.54 (10-15-22 @ 06:42)  WBC Count: 9.39 (10-14-22 @ 23:30)  WBC Count: 9.49 (10-14-22 @ 04:02)  WBC Count: 8.54 (10-13-22 @ 06:00)      Auto Neutrophil #: 6.98 K/uL (10-14-22 @ 23:30)  Auto Neutrophil #: 8.14 K/uL (10-11-22 @ 07:14)  Auto Neutrophil #: 5.51 K/uL (10-10-22 @ 18:50)      Creatine Trend:  Creatinine, Serum: 0.94 (10-15)  Creatinine, Serum: 1.06 (10-14)  Creatinine, Serum: 0.81 (10-13)  Creatinine, Serum: 0.92 (10-12)      Liver Biochemical Testing Trend:  Alanine Aminotransferase (ALT/SGPT): 19 (10-12)  Alanine Aminotransferase (ALT/SGPT): 19 (10-10)  Aspartate Aminotransferase (AST/SGOT): 24 (10-12-22 @ 05:45)  Aspartate Aminotransferase (AST/SGOT): 20 (10-10-22 @ 18:50)  Bilirubin Total, Serum: 0.5 (10-12)  Bilirubin Total, Serum: 0.3 (10-10)      Trend LDH      Auto Eosinophil %: 1.7 % (10-14-22 @ 23:30)      Urinalysis Basic - ( 15 Oct 2022 05:00 )    Color: Light Yellow / Appearance: Clear / S.009 / pH: x  Gluc: x / Ketone: Negative  / Bili: Negative / Urobili: <2 mg/dL   Blood: x / Protein: Trace / Nitrite: Negative   Leuk Esterase: Large / RBC: 0 /HPF / WBC 69 /HPF   Sq Epi: x / Non Sq Epi: 1 /HPF / Bacteria: Few        MICROBIOLOGY:    Rapid RVP Result: NotDetec (10-15 @ 00:27)  Rapid RVP Result: NotDetec (10-10 @ 19:09)  COVID-19 PCR: NotDetec (10-12-22 @ 16:30)  Procalcitonin, Serum: 0.04 (10-14)  Serum Pro-Brain Natriuretic Peptide: 1206 (10-10)  Troponin T, High Sensitivity Result: 24 (10-11)  Lactate, Blood: 1.1 (10-14 @ 23:30)  A1C with Estimated Average Glucose Result: 8.0 % (10-11-22 @ 07:14)      RADIOLOGY:  imaging below personally reviewed    < from: Xray Chest 1 View- PORTABLE-Routine (Xray Chest 1 View- PORTABLE-Routine in AM.) (10.14.22 @ 08:25) >  No pneumothorax. Questionable left infiltrate    < end of copied text >    < from: CT Angio Chest PE Protocol w/ IV Cont (10.10.22 @ 21:46) >  Cardiomegaly and moderate pulmonary edema.    No main, right main, left main, lobar or segmental pulmonary embolism.   Limited evaluation of subsegmental pulmonary arteries secondary to motion   and mixing artifact..      < end of copied text >      < from: Xray Chest 1 View AP/PA (10.10.22 @ 19:30) >  Prominent interstitial markings may represent pulmonary edema. No focal consolidations.  < end of copied text >

## 2022-10-15 NOTE — PROVIDER CONTACT NOTE (OTHER) - RECOMMENDATIONS
Pulm consult, continue to monitor Spo2, Bipap
STAT Lactate, CBC, Urine analysis, RVP with Covid-19 DIEUDONNE   STAT Cxray  Acetaminophen 100mg po tab  Ice packs to axilla and groin

## 2022-10-15 NOTE — PROGRESS NOTE ADULT - SUBJECTIVE AND OBJECTIVE BOX
Date of Service  : 10-15-22     INTERVAL HPI/OVERNIGHT EVENTS: Spiked temp.   Vital Signs Last 24 Hrs  T(C): 38.4 (15 Oct 2022 19:00), Max: 39 (14 Oct 2022 22:35)  T(F): 101.2 (15 Oct 2022 19:00), Max: 102.2 (14 Oct 2022 22:35)  HR: 100 (15 Oct 2022 17:10) (80 - 100)  BP: 154/95 (15 Oct 2022 17:10) (148/93 - 178/96)  BP(mean): --  RR: 18 (15 Oct 2022 16:13) (18 - 20)  SpO2: 97% (15 Oct 2022 16:13) (91% - 98%)    Parameters below as of 15 Oct 2022 16:13  Patient On (Oxygen Delivery Method): nasal cannula  O2 Flow (L/min): 4    I&O's Summary    14 Oct 2022 07:01  -  15 Oct 2022 07:00  --------------------------------------------------------  IN: 200 mL / OUT: 1500 mL / NET: -1300 mL    15 Oct 2022 07:01  -  15 Oct 2022 19:32  --------------------------------------------------------  IN: 450 mL / OUT: 1300 mL / NET: -850 mL      MEDICATIONS  (STANDING):  aspirin enteric coated 81 milliGRAM(s) Oral daily  atorvastatin 40 milliGRAM(s) Oral at bedtime  budesonide 160 MICROgram(s)/formoterol 4.5 MICROgram(s) Inhaler 2 Puff(s) Inhalation two times a day  cefepime   IVPB      cefepime   IVPB 2000 milliGRAM(s) IV Intermittent every 8 hours  dextrose 5%. 1000 milliLiter(s) (50 mL/Hr) IV Continuous <Continuous>  dextrose 5%. 1000 milliLiter(s) (100 mL/Hr) IV Continuous <Continuous>  dextrose 50% Injectable 25 Gram(s) IV Push once  dextrose 50% Injectable 12.5 Gram(s) IV Push once  dextrose 50% Injectable 25 Gram(s) IV Push once  glucagon  Injectable 1 milliGRAM(s) IntraMuscular once  heparin   Injectable 5000 Unit(s) SubCutaneous every 12 hours  hydrALAZINE 25 milliGRAM(s) Oral three times a day  ibuprofen  Tablet. 400 milliGRAM(s) Oral once  influenza  Vaccine (HIGH DOSE) 0.7 milliLiter(s) IntraMuscular once  insulin glargine Injectable (LANTUS) 32 Unit(s) SubCutaneous every morning  insulin lispro (ADMELOG) corrective regimen sliding scale   SubCutaneous three times a day before meals  insulin lispro (ADMELOG) corrective regimen sliding scale   SubCutaneous at bedtime  insulin lispro Injectable (ADMELOG) 10 Unit(s) SubCutaneous three times a day before meals  losartan 100 milliGRAM(s) Oral daily  metoclopramide Injectable 10 milliGRAM(s) IV Push three times a day  metoprolol tartrate 25 milliGRAM(s) Oral two times a day  multivitamin 1 Tablet(s) Oral daily  NIFEdipine XL 30 milliGRAM(s) Oral daily    MEDICATIONS  (PRN):  acetaminophen     Tablet .. 650 milliGRAM(s) Oral every 6 hours PRN Temp greater or equal to 38C (100.4F), Mild Pain (1 - 3)  ALBUTerol    90 MICROgram(s) HFA Inhaler 1 Puff(s) Inhalation every 6 hours PRN Shortness of Breath  cyclobenzaprine 5 milliGRAM(s) Oral three times a day PRN Muscle Spasm  dextrose Oral Gel 15 Gram(s) Oral once PRN Blood Glucose LESS THAN 70 milliGRAM(s)/deciliter  melatonin 3 milliGRAM(s) Oral at bedtime PRN Insomnia  ondansetron Injectable 4 milliGRAM(s) IV Push every 8 hours PRN Nausea and/or Vomiting    LABS:                        12.2   9.54  )-----------( 265      ( 15 Oct 2022 06:42 )             40.1     10-15    134<L>  |  95<L>  |  15  ----------------------------<  124<H>  4.2   |  30  |  0.94    Ca    8.7      15 Oct 2022 06:42  Phos  3.8     10-15  Mg     2.10     10-15        Urinalysis Basic - ( 15 Oct 2022 05:00 )    Color: Light Yellow / Appearance: Clear / S.009 / pH: x  Gluc: x / Ketone: Negative  / Bili: Negative / Urobili: <2 mg/dL   Blood: x / Protein: Trace / Nitrite: Negative   Leuk Esterase: Large / RBC: 0 /HPF / WBC 69 /HPF   Sq Epi: x / Non Sq Epi: 1 /HPF / Bacteria: Few      CAPILLARY BLOOD GLUCOSE      POCT Blood Glucose.: 129 mg/dL (15 Oct 2022 16:38)  POCT Blood Glucose.: 161 mg/dL (15 Oct 2022 11:51)  POCT Blood Glucose.: 133 mg/dL (15 Oct 2022 08:10)  POCT Blood Glucose.: 150 mg/dL (14 Oct 2022 21:24)        Urinalysis Basic - ( 15 Oct 2022 05:00 )    Color: Light Yellow / Appearance: Clear / S.009 / pH: x  Gluc: x / Ketone: Negative  / Bili: Negative / Urobili: <2 mg/dL   Blood: x / Protein: Trace / Nitrite: Negative   Leuk Esterase: Large / RBC: 0 /HPF / WBC 69 /HPF   Sq Epi: x / Non Sq Epi: 1 /HPF / Bacteria: Few      REVIEW OF SYSTEMS:  CONSTITUTIONAL: No, weight loss, or fatigue  EYES: No eye pain, visual disturbances, or discharge  ENMT:  No difficulty hearing, tinnitus, vertigo; No sinus or throat pain  NECK: No pain or stiffness  RESPIRATORY: No cough, wheezing, chills or hemoptysis; No shortness of breath  CARDIOVASCULAR: No chest pain, palpitations, dizziness, or leg swelling  GASTROINTESTINAL: No abdominal or epigastric pain. No nausea, vomiting, or hematemesis; No diarrhea or constipation. No melena or hematochezia.  GENITOURINARY: No dysuria, frequency, hematuria, or incontinence  NEUROLOGICAL: No headaches, memory loss, loss of strength, numbness, or tremors      Consultant(s) Notes Reviewed:  [x ] YES  [ ] NO    PHYSICAL EXAM:  GENERAL: NAD, well-groomed, well-developed, not in any distress ,  HEAD:  Atraumatic, Normocephalic  NECK: Supple, No JVD, Normal thyroid  NERVOUS SYSTEM:  Alert & Oriented X3, No focal deficit   CHEST/LUNG: Good air entry bilateral with no  rales, rhonchi, wheezing, or rubs  HEART: Regular rate and rhythm; No murmurs, rubs, or gallops  ABDOMEN: Soft, Nontender, Nondistended; Bowel sounds present  EXTREMITIES:  2+ Peripheral Pulses, No clubbing, cyanosis, or edema  SKIN: No rashes or lesions    Care Discussed with Consultants/Other Providers [ x] YES  [ ] NO

## 2022-10-15 NOTE — PROGRESS NOTE ADULT - ASSESSMENT
Patient is a 70 yo woman with history of an aneurysm, HTN, DM Type 2, HLD, h/o Covid-19  (residual symptoms, home oxygen intermittently, NC 2L/min PRN, diagnosed with chronic lung disease 2/2 covid-19) c/o coughing up bright red blood today x 2 episodes.     Patient provides a white towel with several bloody stains (2x2cm) from home and a bottle with a blood clot (0.5x2cm in size).  No prior episodes like that in the past.     From the cardiopulmonary perspective, she also reports having three weeks of progressive coughing with worsening SOB.   Also reports associated worsening bilateral lower extremity swelling. States has had no palpitations, CP, fevers, Abd pain, n/v/d; Of note, patient was treated with Azithromycin x 5 days by PCP with no relief. Reports no recent travel, no hx of TB, non-smoker. (11 Oct 2022 04:44)    CTA chest:  --No central PE  --Cardiomegaly and moderate pulmonary edema.    When evaluated this AM, patient appeared clinically and hemodynamically stable.  On exam, coarse breath sounds noted, +LE edema bilaterally.    --Unclear etiology for current symptoms which include neck area pain, headaches, hemoptysis    --Echocardiogram reviewed -- normal biventricular systolic function  --No findings of bleeding noted on bronchoscopy  --No further cardiac testing needed at this time.  --ID team recommending empiric treatment with IV abx  --CTA neck/head pending

## 2022-10-15 NOTE — CONSULT NOTE ADULT - ASSESSMENT
Ms Leavitt is a 72 yo Female with PMHx aneurysm, HTN, DM Type 2, HLD, h/o Covid-19  (residual symptoms, home oxygen intermittently, NC 2L/min PRN, diagnosed with chronic lung disease 2/2 covid-19) who presented to the ED with c/o coughing up bright red blood x 2 episodes on 10/11. Reports 3 weeks of coughing with worsening SOB and associated worsening bilateral lower extremity swelling. Pt was admitted for workup of possible CHF and hemoptysis. So far ECHO, Bronch and Flex larngyoscopy negative. Pt has developed severe headache and neck pain and was being worked up for that when she spiked fever overnight and ID consulted for the same.     WORKUP  UA (10/15): Nitrite: Negative, Leuk Esterase: Large WBC 69 /HPF / Bacteria: Few  RVP: Negative  CXR (10/10): No pneumothorax. Questionable left infiltrate  CTA Chest and CT a/p with Con (!0/10): Cardiomegaly and moderate pulmonary edema.  Echocardiogram reviewed -- normal biventricular systolic function    DIAGNOSIS and IMPRESSION  ·	Febrile illness  ·	Headache  ·	Hemoptysis (Negative)  ·	Chronic Lung Disease secondary to COVID .   ·	Allergy: Penicillin     Afebrile on admission, however spiked a fever to 102 overnight w/o leukocytosis  UA with some pyuria and CXR with questionable infiltrate due to significant pulm edema  noted to have Pulmonary edema on CT scan on admission, however ECHO was with normal biventricular function.  Hemoptysis resolved and Bronch and flex laryngoscopy negative    RECOMMENDATIONS  Sputum, urine and BLood cx pending  CTA head and and neck ordered to workup headache    PT TO BE SEEN. PRELIM NOTE  PENDING RECS. PLEASE WAIT FOR FINAL RECS AFTER DISCUSSION WITH ATTENDINGMichell Patel MD, PGY5  ID fellow  [employer] Teams Preferred  After 5pm/weekends call [phone]   Ms Leavitt is a 72 yo Female with PMHx aneurysm, HTN, DM Type 2, HLD, h/o Covid-19  (residual symptoms, home oxygen intermittently, NC 2L/min PRN, diagnosed with chronic lung disease 2/2 covid-19) who presented to the ED with c/o coughing up bright red blood x 2 episodes on 10/11. Reports 3 weeks of coughing with worsening SOB and associated worsening bilateral lower extremity swelling. Pt was admitted for workup of possible CHF and hemoptysis. So far ECHO, Bronch and Flex larngyoscopy negative. Pt has developed severe headache and neck pain and was being worked up for that when she spiked fever overnight and ID consulted for the same.     WORKUP  UA (10/15): Nitrite: Negative, Leuk Esterase: Large WBC 69 /HPF / Bacteria: Few  RVP: Negative  CXR (10/10): No pneumothorax. Questionable left infiltrate  CTA Chest and CT a/p with Con (!0/10): Cardiomegaly and moderate pulmonary edema.  Echocardiogram reviewed -- normal biventricular systolic function    DIAGNOSIS and IMPRESSION  ·	Febrile illness  ·	Headache  ·	Hemoptysis (Negative)  ·	Chronic Lung Disease secondary to COVID .   ·	Allergy: Penicillin     Afebrile on admission, however spiked a fever to 102 overnight w/o leukocytosis  UA with some pyuria and CXR with questionable infiltrate due to significant pulm edema  noted to have Pulmonary edema on CT scan on admission, however ECHO was with normal biventricular function.  Hemoptysis resolved and Bronch and flex laryngoscopy negative    RECOMMENDATIONS  Sputum, urine and BLood cx pending  CTA head and and neck ordered to workup headache  Unclear source -> Will f/u cultures  Recommend empiric cefepime 2gm Q8 pending cx  Trend WBC and monitor for fevers  May benefit from autoimmune workup given fevers, pulm infiltrates, hemotysis of unexplained origin    Pt seen and examined. Case d/w attending   Recommendation provided to primary team via page    Phi Patel MD, PGY5  ID fellow  [employer] Teams Preferred  After 5pm/weekends call [phone]

## 2022-10-15 NOTE — CONSULT NOTE ADULT - ATTENDING COMMENTS
72 yo woman h/o covid 2021 then home O2 since   admitted with cough  and hemoptysis; w/u cardiology, ENT, GI  developed fever 102 with CXR showing infiltrates   would start cefepime for possible pneumonia  consider non infectious etiologies for pulmonary infiltrates as well     will follow

## 2022-10-15 NOTE — PROGRESS NOTE ADULT - SUBJECTIVE AND OBJECTIVE BOX
Cardiology/Vascular Medicine Inpatient Progress Note    Patient states having discomfort on the right side of her neck and back.    No significant interval changes noted on telemetry.    Vital Signs Last 24 Hrs  T(C): 38.4 (15 Oct 2022 19:00), Max: 39 (14 Oct 2022 22:35)  T(F): 101.2 (15 Oct 2022 19:00), Max: 102.2 (14 Oct 2022 22:35)  HR: 100 (15 Oct 2022 17:10) (80 - 100)  BP: 154/95 (15 Oct 2022 17:10) (148/93 - 178/96)  BP(mean): --  RR: 18 (15 Oct 2022 16:13) (18 - 20)  SpO2: 97% (15 Oct 2022 16:13) (91% - 98%)    Parameters below as of 15 Oct 2022 16:13  Patient On (Oxygen Delivery Method): nasal cannula  O2 Flow (L/min): 4    I&O's Detail    14 Oct 2022 07:01  -  15 Oct 2022 07:00  --------------------------------------------------------  IN:    Oral Fluid: 200 mL  Total IN: 200 mL    OUT:    Voided (mL): 1500 mL  Total OUT: 1500 mL    Total NET: -1300 mL      15 Oct 2022 07:01  -  15 Oct 2022 19:57  --------------------------------------------------------  IN:    Oral Fluid: 450 mL  Total IN: 450 mL    OUT:    Voided (mL): 1300 mL  Total OUT: 1300 mL    Total NET: -850 mL    Appearance: NAD  HEENT:   No JVD  Cardiovascular: Normal S1 S2  Respiratory: Decreased breath sounds bilaterally  Gastrointestinal:  Soft, Non-tender, + BS	  Neurologic: Non-focal    MEDICATIONS  (STANDING):  aspirin enteric coated 81 milliGRAM(s) Oral daily  atorvastatin 40 milliGRAM(s) Oral at bedtime  budesonide 160 MICROgram(s)/formoterol 4.5 MICROgram(s) Inhaler 2 Puff(s) Inhalation two times a day  cefepime   IVPB      cefepime   IVPB 2000 milliGRAM(s) IV Intermittent every 8 hours  dextrose 5%. 1000 milliLiter(s) (50 mL/Hr) IV Continuous <Continuous>  dextrose 5%. 1000 milliLiter(s) (100 mL/Hr) IV Continuous <Continuous>  dextrose 50% Injectable 25 Gram(s) IV Push once  dextrose 50% Injectable 12.5 Gram(s) IV Push once  dextrose 50% Injectable 25 Gram(s) IV Push once  glucagon  Injectable 1 milliGRAM(s) IntraMuscular once  heparin   Injectable 5000 Unit(s) SubCutaneous every 12 hours  hydrALAZINE 25 milliGRAM(s) Oral three times a day  ibuprofen  Tablet. 400 milliGRAM(s) Oral once  influenza  Vaccine (HIGH DOSE) 0.7 milliLiter(s) IntraMuscular once  insulin glargine Injectable (LANTUS) 32 Unit(s) SubCutaneous every morning  insulin lispro (ADMELOG) corrective regimen sliding scale   SubCutaneous three times a day before meals  insulin lispro (ADMELOG) corrective regimen sliding scale   SubCutaneous at bedtime  insulin lispro Injectable (ADMELOG) 10 Unit(s) SubCutaneous three times a day before meals  losartan 100 milliGRAM(s) Oral daily  metoclopramide Injectable 10 milliGRAM(s) IV Push three times a day  metoprolol tartrate 25 milliGRAM(s) Oral two times a day  multivitamin 1 Tablet(s) Oral daily  NIFEdipine XL 30 milliGRAM(s) Oral daily    MEDICATIONS  (PRN):  acetaminophen     Tablet .. 650 milliGRAM(s) Oral every 6 hours PRN Temp greater or equal to 38C (100.4F), Mild Pain (1 - 3)  ALBUTerol    90 MICROgram(s) HFA Inhaler 1 Puff(s) Inhalation every 6 hours PRN Shortness of Breath  cyclobenzaprine 5 milliGRAM(s) Oral three times a day PRN Muscle Spasm  dextrose Oral Gel 15 Gram(s) Oral once PRN Blood Glucose LESS THAN 70 milliGRAM(s)/deciliter  melatonin 3 milliGRAM(s) Oral at bedtime PRN Insomnia  ondansetron Injectable 4 milliGRAM(s) IV Push every 8 hours PRN Nausea and/or Vomiting      LABS:	 	                          12.2   9.54  )-----------( 265      ( 15 Oct 2022 06:42 )             40.1     10-15    134<L>  |  95<L>  |  15  ----------------------------<  124<H>  4.2   |  30  |  0.94    Ca    8.7      15 Oct 2022 06:42  Phos  3.8     10-15  Mg     2.10     10-15        proBNP: Serum Pro-Brain Natriuretic Peptide: 1206 pg/mL (10-10-22 @ 18:50)    < from: CT Angio Chest PE Protocol w/ IV Cont (10.10.22 @ 21:46) >  ACC: [id number] EXAM:  CT ABDOMEN AND PELVIS IC                        ACC: [id number] EXAM:  CT ANGIO CHEST PULM [facility]                          PROCEDURE DATE:  10/10/2022          INTERPRETATION:  CLINICAL INFORMATION: Cough and worsening dyspnea. Rule   out PE    COMPARISON: None available    CONTRAST/COMPLICATIONS:  IV Contrast: Omnipaque 350  90 cc administered   10 cc discarded  Oral Contrast: NONE  Complications: None reported at time of study completion    PROCEDURE:  CT Angiography of the Chest was performed followed by portal venous phase   imaging of the Abdomen and Pelvis.  Sagittal and coronal reformats were performed as well as 3D (MIP)   reconstructions.    FINDINGS:  CHEST:  LUNGS AND LARGE AIRWAYS: Patent central airways. Diffuse bilateral   groundglass opacities and interlobular septal thickening consistent with   pulmonary edema.  PLEURA: No pleural effusion.  VESSELS: Aortic and coronary artery calcifications. No main, right main,   left main, lobar or segmental pulmonary embolism. Limited evaluation of   subsegmental pulmonary arteries secondary to motion and mixing artifact.  HEART: Cardiomegaly. Trace pericardial effusion. Mitral valve annular   calcifications.  MEDIASTINUM AND YAAKOV: Prominent mediastinal lymph nodes measure less than   1.5 cm in short axis diameter.  CHEST WALL AND LOWER NECK: Within normal limits.    ABDOMEN AND PELVIS:  LIVER: Within normal limits.  BILE DUCTS: Normal caliber.  GALLBLADDER: Within normal limits.  SPLEEN: Within normal limits.  PANCREAS: Within normal limits.  ADRENALS: Within normal limits.  KIDNEYS/URETERS: Left renal cysts and additional bilateral hypodensities   too small characterize. No hydronephrosis.    BLADDER: Within normal limits.  REPRODUCTIVE ORGANS: Fibroid uterus. A 4.0 cm right adnexal cyst.    BOWEL: No bowel obstruction. Appendix is normal.  PERITONEUM: No ascites.  VESSELS: Atherosclerotic changes.  RETROPERITONEUM/LYMPH NODES: No lymphadenopathy.  ABDOMINAL WALL: Small fat-containing umbilicalhernia.  BONES: Degenerative changes.    IMPRESSION:  Cardiomegaly and moderate pulmonary edema.    No main, right main, left main, lobar or segmental pulmonary embolism.   Limited evaluation of subsegmental pulmonary arteries secondary to motion   and mixing artifact..        --- End of Report ---          LIZZ NATHAN MD; Resident Radiologist  This document has been electronically signed.  NICHOLAS RITCHIE MD; Attending Radiologist  This document has been electronically signed. Oct 10 2022 10:35PM    < end of copied text >  	  < from: Transthoracic Echocardiogram (10.11.22 @ 07:20) >    Patient name: JONNY WORTHY  YOB: 1950   Age: 71 (F)   MR#: [id number]  Study Date: 10/11/2022  [facility] Gertrudis Dowling Y[facility]NI  Study quality: Technically Difficult  Referring Physician: Keegan Plascencia MD, PhD  Blood Pressure: 134/86 mmHg  Height: 168 cm  Weight: 101 kg  BSA: 2.1 m2  ------------------------------------------------------------------------  PROCEDURE: Transthoracic echocardiogram with 2-D, M-Mode  and complete spectral and color flow Doppler.  INDICATION: Heart failure, unspecified (I50.9)  ------------------------------------------------------------------------  OBSERVATIONS:  Mitral Valve: Mitral annular calcification, otherwise  normal mitral valve. Minimal mitral regurgitation.  Aortic Root: Normal aortic root.  Aortic Valve: Aortic valve leaflet morphology not well  visualized.  Left Atrium: Normal left atrium.  Left Ventricle: Endocardium not well visualized; grossly  normal left ventricular systolic function.  Right Heart: Normalright atrium. The right ventricle is  not well visualized; grossly normal right ventricular  systolic function. Normal tricuspid valve. Minimal  tricuspid regurgitation. Normal pulmonic valve.  Pericardium/PleuraNormal pericardium with no pericardial  effusion.  ------------------------------------------------------------------------  CONCLUSIONS:  1. Mitral annular calcification, otherwise normal mitral  valve. Minimal mitral regurgitation.  2. Endocardium not well visualized; grossly normal left  ventricular systolic function.  3. The right ventricle is not well visualized; grossly  normal right ventricular systolic function.  ------------------------------------------------------------------------  Confirmed on  10/11/2022 - 08:38:01 by Merlin Wilkerson M.D.,  [facility], [facility]  ------------------------------------------------------------------------    < end of copied text >

## 2022-10-15 NOTE — PROGRESS NOTE ADULT - ASSESSMENT
70yo Female with MHx aneurysm, HTN, DM Type 2, HLD, h/o Covid-19  (residual symptoms, home oxygen intermittently, NC 2L/min PRN, diagnosed with chronic lung disease 2/2 covid-19) c/o coughing up bright red blood today x 2 episodes. Pt provides a white towel with several bloody stains (2x2cm) from home and a bottle with a blood clot (0.5x2cm in size).  No prior episodes like that in the past. Reports 3 weeks of coughing with worsening SOB. Also reports associated worsening bilateral lower extremity swelling. States has had no palpitations, CP, fevers, Abd pain, n/v/d; Of note, patient was treated with Azithromycin x 5 days by PCP with no relief. Reports no recent travel, no hx of TB, non-smoker. I feel fine today .      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   ·  Plan: -On Oxygen .  Cardiology and pulmonary  helping.   < from: Transthoracic Echocardiogram (10.11.22 @ 07:20) >  CONCLUSIONS:  1. Mitral annular calcification, otherwise normal mitral  valve. Minimal mitral regurgitation.  2. Endocardium not well visualized; grossly normal left  ventricular systolic function.  3. The right ventricle is not well visualized; grossly  normal right ventricular systolic function.    < end of copied text >       Problem/Plan - 2:  ·  Problem: Hemoptysis.   ·  Plan: S/P  Bronchoscopy and ENT evaluation.   CTA chest: No main, right main, left main, lobar or segmental pulmonary embolism. Limited evaluation of subsegmental pulmonary arteries secondary to motion and mixing artifact. Patent central airways. Diffuse bilateral   groundglass opacities and interlobular septal thickening consistent with pulmonary edema. No pleural effusion.  -Hold Heparin for DVT  -Pulmonology following.   -Supp O2 NC  -VS q4h  -Elev HOB.     Problem/Plan - 3:  ·  Problem: Type 2 diabetes mellitus with hyperglycemia, with long-term current use of insulin.   ·  Plan: -Sugars fine.  Conservative insulin dosage: Lantus 40u -->32u in AM, Novolog 25u TID --> 10u TID while inpt  -monitor closely FS (pt states that skips insulin sometimes due to low BG)   -moderate IASS  -a1c  -DM diet.     Problem/Plan - 4:  ·  Problem: Headache and Neck Pain .   ·  Plan: Now resolved. Rpt CT head in Am.   Neurology helping.   < from: CT Angio Head w/ IV Cont (10.15.22 @ 11:24) >  IMPRESSION:  CT BRAIN:  Acute right cerebellar infarct suspected as described above.    CT ANGIOGRAPHY NECK:  No evidence of hemodynamically significant stenosis using NASCET   criteria. Patent vertebral arteries. No evidence of vascular dissection.    CT ANGIOGRAPHY BRAIN:  No major vessel occlusion or proximal stenosis.    < end of copied text >       Problem/Plan - 5:  ·  Problem: FEVER .   ·  Plan: Id Helping . Abxs started.     Dispo : DC planning pending above.

## 2022-10-16 DIAGNOSIS — R50.9 FEVER, UNSPECIFIED: ICD-10-CM

## 2022-10-16 LAB
A1C WITH ESTIMATED AVERAGE GLUCOSE RESULT: 7.9 % — HIGH (ref 4–5.6)
ANION GAP SERPL CALC-SCNC: 5 MMOL/L — LOW (ref 7–14)
BUN SERPL-MCNC: 13 MG/DL — SIGNIFICANT CHANGE UP (ref 7–23)
CALCIUM SERPL-MCNC: 8.7 MG/DL — SIGNIFICANT CHANGE UP (ref 8.4–10.5)
CHLORIDE SERPL-SCNC: 100 MMOL/L — SIGNIFICANT CHANGE UP (ref 98–107)
CO2 SERPL-SCNC: 32 MMOL/L — HIGH (ref 22–31)
CREAT SERPL-MCNC: 0.81 MG/DL — SIGNIFICANT CHANGE UP (ref 0.5–1.3)
CULTURE RESULTS: SIGNIFICANT CHANGE UP
EGFR: 78 ML/MIN/1.73M2 — SIGNIFICANT CHANGE UP
ESTIMATED AVERAGE GLUCOSE: 180 — SIGNIFICANT CHANGE UP
FOLATE SERPL-MCNC: 20 NG/ML — HIGH (ref 3.1–17.5)
GLUCOSE BLDC GLUCOMTR-MCNC: 122 MG/DL — HIGH (ref 70–99)
GLUCOSE BLDC GLUCOMTR-MCNC: 126 MG/DL — HIGH (ref 70–99)
GLUCOSE BLDC GLUCOMTR-MCNC: 133 MG/DL — HIGH (ref 70–99)
GLUCOSE BLDC GLUCOMTR-MCNC: 141 MG/DL — HIGH (ref 70–99)
GLUCOSE BLDC GLUCOMTR-MCNC: 97 MG/DL — SIGNIFICANT CHANGE UP (ref 70–99)
GLUCOSE SERPL-MCNC: 122 MG/DL — HIGH (ref 70–99)
HCT VFR BLD CALC: 38.4 % — SIGNIFICANT CHANGE UP (ref 34.5–45)
HGB BLD-MCNC: 11.6 G/DL — SIGNIFICANT CHANGE UP (ref 11.5–15.5)
MAGNESIUM SERPL-MCNC: 2.2 MG/DL — SIGNIFICANT CHANGE UP (ref 1.6–2.6)
MCHC RBC-ENTMCNC: 27.1 PG — SIGNIFICANT CHANGE UP (ref 27–34)
MCHC RBC-ENTMCNC: 30.2 GM/DL — LOW (ref 32–36)
MCV RBC AUTO: 89.7 FL — SIGNIFICANT CHANGE UP (ref 80–100)
NRBC # BLD: 0 /100 WBCS — SIGNIFICANT CHANGE UP (ref 0–0)
NRBC # FLD: 0 K/UL — SIGNIFICANT CHANGE UP (ref 0–0)
PHOSPHATE SERPL-MCNC: 3.3 MG/DL — SIGNIFICANT CHANGE UP (ref 2.5–4.5)
PLATELET # BLD AUTO: 252 K/UL — SIGNIFICANT CHANGE UP (ref 150–400)
POTASSIUM SERPL-MCNC: 4.3 MMOL/L — SIGNIFICANT CHANGE UP (ref 3.5–5.3)
POTASSIUM SERPL-SCNC: 4.3 MMOL/L — SIGNIFICANT CHANGE UP (ref 3.5–5.3)
RBC # BLD: 4.28 M/UL — SIGNIFICANT CHANGE UP (ref 3.8–5.2)
RBC # FLD: 14.6 % — HIGH (ref 10.3–14.5)
RHEUMATOID FACT SERPL-ACNC: 12 IU/ML — SIGNIFICANT CHANGE UP (ref 0–13)
SODIUM SERPL-SCNC: 137 MMOL/L — SIGNIFICANT CHANGE UP (ref 135–145)
SPECIMEN SOURCE: SIGNIFICANT CHANGE UP
VIT B12 SERPL-MCNC: 2000 PG/ML — HIGH (ref 200–900)
WBC # BLD: 9.69 K/UL — SIGNIFICANT CHANGE UP (ref 3.8–10.5)
WBC # FLD AUTO: 9.69 K/UL — SIGNIFICANT CHANGE UP (ref 3.8–10.5)

## 2022-10-16 PROCEDURE — 99232 SBSQ HOSP IP/OBS MODERATE 35: CPT

## 2022-10-16 PROCEDURE — 70450 CT HEAD/BRAIN W/O DYE: CPT | Mod: 26

## 2022-10-16 RX ADMIN — Medication 10 UNIT(S): at 11:58

## 2022-10-16 RX ADMIN — Medication 30 MILLIGRAM(S): at 05:20

## 2022-10-16 RX ADMIN — CEFEPIME 100 MILLIGRAM(S): 1 INJECTION, POWDER, FOR SOLUTION INTRAMUSCULAR; INTRAVENOUS at 05:20

## 2022-10-16 RX ADMIN — HEPARIN SODIUM 5000 UNIT(S): 5000 INJECTION INTRAVENOUS; SUBCUTANEOUS at 05:20

## 2022-10-16 RX ADMIN — Medication 81 MILLIGRAM(S): at 11:58

## 2022-10-16 RX ADMIN — Medication 25 MILLIGRAM(S): at 02:20

## 2022-10-16 RX ADMIN — CEFEPIME 100 MILLIGRAM(S): 1 INJECTION, POWDER, FOR SOLUTION INTRAMUSCULAR; INTRAVENOUS at 21:36

## 2022-10-16 RX ADMIN — BUDESONIDE AND FORMOTEROL FUMARATE DIHYDRATE 2 PUFF(S): 160; 4.5 AEROSOL RESPIRATORY (INHALATION) at 08:01

## 2022-10-16 RX ADMIN — Medication 10 MILLIGRAM(S): at 01:00

## 2022-10-16 RX ADMIN — Medication 10 MILLIGRAM(S): at 10:31

## 2022-10-16 RX ADMIN — HEPARIN SODIUM 5000 UNIT(S): 5000 INJECTION INTRAVENOUS; SUBCUTANEOUS at 17:19

## 2022-10-16 RX ADMIN — Medication 650 MILLIGRAM(S): at 05:00

## 2022-10-16 RX ADMIN — Medication 10 UNIT(S): at 08:02

## 2022-10-16 RX ADMIN — Medication 25 MILLIGRAM(S): at 17:19

## 2022-10-16 RX ADMIN — Medication 650 MILLIGRAM(S): at 04:30

## 2022-10-16 RX ADMIN — BUDESONIDE AND FORMOTEROL FUMARATE DIHYDRATE 2 PUFF(S): 160; 4.5 AEROSOL RESPIRATORY (INHALATION) at 21:29

## 2022-10-16 RX ADMIN — Medication 25 MILLIGRAM(S): at 10:30

## 2022-10-16 RX ADMIN — Medication 1 TABLET(S): at 11:58

## 2022-10-16 RX ADMIN — Medication 25 MILLIGRAM(S): at 05:19

## 2022-10-16 RX ADMIN — ATORVASTATIN CALCIUM 40 MILLIGRAM(S): 80 TABLET, FILM COATED ORAL at 21:30

## 2022-10-16 RX ADMIN — LOSARTAN POTASSIUM 100 MILLIGRAM(S): 100 TABLET, FILM COATED ORAL at 05:20

## 2022-10-16 RX ADMIN — CEFEPIME 100 MILLIGRAM(S): 1 INJECTION, POWDER, FOR SOLUTION INTRAMUSCULAR; INTRAVENOUS at 13:22

## 2022-10-16 RX ADMIN — INSULIN GLARGINE 32 UNIT(S): 100 INJECTION, SOLUTION SUBCUTANEOUS at 08:01

## 2022-10-16 RX ADMIN — Medication 10 UNIT(S): at 17:19

## 2022-10-16 NOTE — PROVIDER CONTACT NOTE (OTHER) - ACTION/TREATMENT ORDERED:
STAT Lactate, CBC, Urine analysis, RVP with Covid-19 DIEUDONNE   STAT Cxray  Acetaminophen 100mg po tab  Ice packs to axilla and groin
Aly Morton ACP made aware. Will follow up with pulmo before advancing diet. Will continue to monitor.
Pulm consult, continue to monitor Spo2, Bipap

## 2022-10-16 NOTE — PROGRESS NOTE ADULT - SUBJECTIVE AND OBJECTIVE BOX
[facility]      Seen earlier today Date of service   No new neurological symptoms reported. Remains stable neurologically.   - Today, patient is without complaints.          *****MEDICATIONS: Current medication reviewed and documented.    MEDICATIONS  (STANDING):  aspirin enteric coated 81 milliGRAM(s) Oral daily  atorvastatin 40 milliGRAM(s) Oral at bedtime  budesonide 160 MICROgram(s)/formoterol 4.5 MICROgram(s) Inhaler 2 Puff(s) Inhalation two times a day  cefepime   IVPB      cefepime   IVPB 2000 milliGRAM(s) IV Intermittent every 8 hours  dextrose 5%. 1000 milliLiter(s) (50 mL/Hr) IV Continuous <Continuous>  dextrose 5%. 1000 milliLiter(s) (100 mL/Hr) IV Continuous <Continuous>  dextrose 50% Injectable 25 Gram(s) IV Push once  dextrose 50% Injectable 12.5 Gram(s) IV Push once  dextrose 50% Injectable 25 Gram(s) IV Push once  glucagon  Injectable 1 milliGRAM(s) IntraMuscular once  heparin   Injectable 5000 Unit(s) SubCutaneous every 12 hours  hydrALAZINE 25 milliGRAM(s) Oral three times a day  influenza  Vaccine (HIGH DOSE) 0.7 milliLiter(s) IntraMuscular once  insulin glargine Injectable (LANTUS) 32 Unit(s) SubCutaneous every morning  insulin lispro (ADMELOG) corrective regimen sliding scale   SubCutaneous three times a day before meals  insulin lispro (ADMELOG) corrective regimen sliding scale   SubCutaneous at bedtime  insulin lispro Injectable (ADMELOG) 10 Unit(s) SubCutaneous three times a day before meals  losartan 100 milliGRAM(s) Oral daily  metoprolol tartrate 25 milliGRAM(s) Oral two times a day  multivitamin 1 Tablet(s) Oral daily  NIFEdipine XL 30 milliGRAM(s) Oral daily    MEDICATIONS  (PRN):  acetaminophen     Tablet .. 650 milliGRAM(s) Oral every 6 hours PRN Temp greater or equal to 38C (100.4F), Mild Pain (1 - 3)  ALBUTerol    90 MICROgram(s) HFA Inhaler 1 Puff(s) Inhalation every 6 hours PRN Shortness of Breath  cyclobenzaprine 5 milliGRAM(s) Oral three times a day PRN Muscle Spasm  dextrose Oral Gel 15 Gram(s) Oral once PRN Blood Glucose LESS THAN 70 milliGRAM(s)/deciliter  melatonin 3 milliGRAM(s) Oral at bedtime PRN Insomnia  ondansetron Injectable 4 milliGRAM(s) IV Push every 8 hours PRN Nausea and/or Vomiting          ***** VITAL SIGNS:   Vital Signs Last 24 Hrs       T(F): 98.2 (10-16-22 @ 10:44), Max: 101.4 (10-15-22 @ 17:37)  HR: 82 (10-16-22 @ 10:44) (77 - 100)  BP: 140/89 (10-16-22 @ 10:44) (140/89 - 157/96)  RR: 19 (10-16-22 @ 10:44) (18 - 20)  SpO2: 96% (10-16-22 @ 10:44) (96% - 98%)  Wt(kg): --  I&O's Summary    15 Oct 2022 07:01  -  16 Oct 2022 07:00  --------------------------------------------------------  IN: 1170 mL / OUT: 3250 mL / NET: -2080 mL             *****PHYSICAL EXAM:   alert oriented x 3 attention comprehension are fair.  Able to name, repeat.   EOmi fundi not visualized   no nystagmus VFF to confrontation  Tongue is midline  Palate elevates symmetrically   Moving all 4 ext spontaneously no drift appreciated    Gait not assessed.            *****LAB AND IMAGIN.6   9.69  )-----------( 252      ( 16 Oct 2022 05:55 )             38.4               10-    137  |  100  |  13  ----------------------------<  122<H>  4.3   |  32<H>  |  0.81    Ca    8.7      16 Oct 2022 05:55  Phos  3.3     10-  Mg     2.20     10-16                         Urinalysis Basic - ( 15 Oct 2022 05:00 )    Color: Light Yellow / Appearance: Clear / S.009 / pH: x  Gluc: x / Ketone: Negative  / Bili: Negative / Urobili: <2 mg/dL   Blood: x / Protein: Trace / Nitrite: Negative   Leuk Esterase: Large / RBC: 0 /HPF / WBC 69 /HPF   Sq Epi: x / Non Sq Epi: 1 /HPF / Bacteria: Few      [All pertinent recent Imaging/Reports reviewed]            *****A S S E S S M E N T   A N D   P L A N :   Excerpt from H&P,"     70yo Female with MHx aneurysm, HTN, DM Type 2, HLD, h/o Covid-19  (residual symptoms, home oxygen intermittently, NC 2L/min PRN, diagnosed with chronic lung disease 2/2 covid-19) c/o coughing up bright red blood today x 2 episodes. Pt provides a white towel with several bloody stains (2x2cm) from home and a bottle with a blood clot (0.5x2cm in size).  No prior episodes like that in the past. Reports 3 weeks of coughing with worsening SOB. Also reports associated worsening bilateral lower extremity swelling. States has had no palpitations, CP, fevers, Abd pain, n/v/d; Of note, patient was treated with Azithromycin x 5 days by PCP with no relief. Reports no recent travel, no hx of TB, non-smoker. (11 Oct 2022 04:44)       Problem/Recommendations 1:  headache  r sided     bp goal normotensive gradually   hx of aneurysm   reglan iv      ct head with possible R sup cerebellar infarct however on repeat ct does not appear to be visible, follow up final read   no large vessel occlusion noted   nih 0     Problem/Recommendations 2: coughing   now with hemoptysis   defer to primary team     Problem/Recommendations 3: fever   likely aspiration   on abx per iD          Thank you for allowing me to participate in the care of this patient. Will continue to follow patient periodically. Please do not hesitate to call me if you have any  questions or if there has been a change in patients neurological status     ________________  Angelica Jewell MD  [facility] ([facility]  [phone]      33 minutes spent on total encounter; more than 50 % of the visit was  spent counseling about plan of care, compliance to diet/exercise and medication regimen and or  coordinating care by the attending physician.      It is advised that stroke patients follow up with ANGELO Crowell @ [phone] in 1- 2 weeks.   Others please follow up with Dr. Michael Nissenbaum [phone]

## 2022-10-16 NOTE — PROGRESS NOTE ADULT - SUBJECTIVE AND OBJECTIVE BOX
Date of Service: 10-16-22 @ 10:50    Patient is a 71y old  Female who presents with a chief complaint of Hemoptysis (15 Oct 2022 19:53)      Any change in ROS: no ton oxygen: no hemoptysis:   breathing wise she feels OK: no mouth bleeding:       MEDICATIONS  (STANDING):  aspirin enteric coated 81 milliGRAM(s) Oral daily  atorvastatin 40 milliGRAM(s) Oral at bedtime  budesonide 160 MICROgram(s)/formoterol 4.5 MICROgram(s) Inhaler 2 Puff(s) Inhalation two times a day  cefepime   IVPB      cefepime   IVPB 2000 milliGRAM(s) IV Intermittent every 8 hours  dextrose 5%. 1000 milliLiter(s) (50 mL/Hr) IV Continuous <Continuous>  dextrose 5%. 1000 milliLiter(s) (100 mL/Hr) IV Continuous <Continuous>  dextrose 50% Injectable 12.5 Gram(s) IV Push once  dextrose 50% Injectable 25 Gram(s) IV Push once  dextrose 50% Injectable 25 Gram(s) IV Push once  glucagon  Injectable 1 milliGRAM(s) IntraMuscular once  heparin   Injectable 5000 Unit(s) SubCutaneous every 12 hours  hydrALAZINE 25 milliGRAM(s) Oral three times a day  influenza  Vaccine (HIGH DOSE) 0.7 milliLiter(s) IntraMuscular once  insulin glargine Injectable (LANTUS) 32 Unit(s) SubCutaneous every morning  insulin lispro (ADMELOG) corrective regimen sliding scale   SubCutaneous three times a day before meals  insulin lispro (ADMELOG) corrective regimen sliding scale   SubCutaneous at bedtime  insulin lispro Injectable (ADMELOG) 10 Unit(s) SubCutaneous three times a day before meals  losartan 100 milliGRAM(s) Oral daily  metoprolol tartrate 25 milliGRAM(s) Oral two times a day  multivitamin 1 Tablet(s) Oral daily  NIFEdipine XL 30 milliGRAM(s) Oral daily    MEDICATIONS  (PRN):  acetaminophen     Tablet .. 650 milliGRAM(s) Oral every 6 hours PRN Temp greater or equal to 38C (100.4F), Mild Pain (1 - 3)  ALBUTerol    90 MICROgram(s) HFA Inhaler 1 Puff(s) Inhalation every 6 hours PRN Shortness of Breath  cyclobenzaprine 5 milliGRAM(s) Oral three times a day PRN Muscle Spasm  dextrose Oral Gel 15 Gram(s) Oral once PRN Blood Glucose LESS THAN 70 milliGRAM(s)/deciliter  melatonin 3 milliGRAM(s) Oral at bedtime PRN Insomnia  ondansetron Injectable 4 milliGRAM(s) IV Push every 8 hours PRN Nausea and/or Vomiting    Vital Signs Last 24 Hrs  T(C): 36.8 (16 Oct 2022 10:44), Max: 38.6 (15 Oct 2022 17:37)  T(F): 98.2 (16 Oct 2022 10:44), Max: 101.4 (15 Oct 2022 17:37)  HR: 82 (16 Oct 2022 10:44) (77 - 100)  BP: 140/89 (16 Oct 2022 10:44) (140/89 - 159/96)  BP(mean): 106 (16 Oct 2022 07:00) (106 - 106)  RR: 19 (16 Oct 2022 10:44) (18 - 20)  SpO2: 96% (16 Oct 2022 10:44) (96% - 98%)    Parameters below as of 16 Oct 2022 10:44  Patient On (Oxygen Delivery Method): nasal cannula  O2 Flow (L/min): 4      I&O's Summary    15 Oct 2022 07:01  -  16 Oct 2022 07:00  --------------------------------------------------------  IN: 1170 mL / OUT: 3250 mL / NET: -2080 mL          Physical Exam:   GENERAL: NAD, well-groomed, well-developed  HEENT: NORMA/   Atraumatic, Normocephalic  ENMT: No tonsillar erythema, exudates, or enlargement; Moist mucous membranes, Good dentition, No lesions  NECK: Supple, No JVD, Normal thyroid  CHEST/LUNG: Clear to auscultaion  CVS: Regular rate and rhythm; No murmurs, rubs, or gallops  GI: : Soft, Nontender, Nondistended; Bowel sounds present  NERVOUS SYSTEM:  Alert & Oriented X3  EXTREMITIES:  2+ Peripheral Pulses, No clubbing, cyanosis, or edema  LYMPH: No lymphadenopathy noted  SKIN: No rashes or lesions  ENDOCRINOLOGY: No Thyromegaly  PSYCH: Appropriate    Labs:  32                            11.6   9.69  )-----------( 252      ( 16 Oct 2022 05:55 )             38.4                         12.2   9.54  )-----------( 265      ( 15 Oct 2022 06:42 )             40.1                         11.4   9.39  )-----------( 257      ( 14 Oct 2022 23:30 )             37.4                         11.7   9.49  )-----------( 282      ( 14 Oct 2022 04:02 )             38.8                         12.0   8.54  )-----------( 291      ( 13 Oct 2022 06:00 )             39.0     10-16    137  |  100  |  13  ----------------------------<  122<H>  4.3   |  32<H>  |  0.81  10-15    134<L>  |  95<L>  |  15  ----------------------------<  124<H>  4.2   |  30  |  0.94  10-14    136  |  96<L>  |  15  ----------------------------<  180<H>  4.6   |  32<H>  |  1.06  10-    140  |  98  |  13  ----------------------------<  111<H>  3.7   |  30  |  0.81    Ca    8.7      16 Oct 2022 05:55  Ca    8.7      15 Oct 2022 06:42  Phos  3.3     10-16  Phos  3.8     10-15  Mg     2.20     10-16  Mg     2.10     10-15      CAPILLARY BLOOD GLUCOSE      POCT Blood Glucose.: 133 mg/dL (16 Oct 2022 07:55)  POCT Blood Glucose.: 122 mg/dL (16 Oct 2022 07:21)  POCT Blood Glucose.: 129 mg/dL (15 Oct 2022 16:38)  POCT Blood Glucose.: 161 mg/dL (15 Oct 2022 11:51)          Urinalysis Basic - ( 15 Oct 2022 05:00 )    Color: Light Yellow / Appearance: Clear / S.009 / pH: x  Gluc: x / Ketone: Negative  / Bili: Negative / Urobili: <2 mg/dL   Blood: x / Protein: Trace / Nitrite: Negative   Leuk Esterase: Large / RBC: 0 /HPF / WBC 69 /HPF   Sq Epi: x / Non Sq Epi: 1 /HPF / Bacteria: Few      Procalcitonin, Serum: 0.04 ng/mL (10-14 @ 23:30)  Lactate, Blood: 1.1 mmol/L (10-14 @ 23:30)        RECENT CULTURES:  10-14 @ 23:45 .Blood Blood-Venous         rad< from: CT Angio Head w/ IV Cont (10.15.22 @ 11:24) >  There is no evidence for significant stenosis, major vessel occlusion, or   aneurysm involving the vertebrobasilar system.    No enlarged vascular lesions or clusters of abnormal vessels are noted to   suggest an arterial venous malformation within the field-of-view.    Visualized portions of the superficial and deep venous systems are   unremarkable.      IMPRESSION:  CT BRAIN:  Acute right cerebellar infarct suspected as described above.    CT ANGIOGRAPHY NECK:  No evidence of hemodynamically significant stenosis using NASCET   criteria. Patent vertebral arteries. No evidence of vascular dissection.    CT ANGIOGRAPHY BRAIN:  No major vessel occlusion or proximal stenosis.  1.  --- End of Report ---          ALFRED SAENZ MD; Resident Radiologist  This document has been electronically signed.  ROSSY RODRIGUEZ MD; Attending Radiologist  This document has been electronically signed. Oct 15 2022 11:56AM    < end of copied text >  < from: Xray Chest 1 View- PORTABLE-Urgent (Xray Chest 1 View- PORTABLE-Urgent .) (10.14.22 @ 23:04) >  ACC: [id number] EXAM:  XR CHEST PORTABLE URGENT 1V                          PROCEDURE DATE:  10/14/2022          INTERPRETATION:  INDICATION: Fever.    COMPARISON: Earlier 10/14/22 exam - 7:16AM. 10/10/22 CT angio of chest.    Technique: AP radiograph of the chest.    FINDINGS:  Heart/Vascular: The heart is stably enlarged..  Pulmonary: Increased reticular opacities and increased pulmonary   vascularity in both lungs - left more than right -due to progression of   pulmonary edema. No pleural effusion or pneumothorax. No focal   infiltrates.  Bones: No acute bony finding.    Impression:  Progression of pulmonary edema changes.        --- End of Report ---            HARISH NATION MD; Attending Radiologist  This document has been electronically signed. Oct 15 2022 10:30AM    < end of copied text >  < from: CT Angio Chest PE Protocol w/ IV Cont (10.10.22 @ 21:46) >  PANCREAS: Within normal limits.  ADRENALS: Within normal limits.  KIDNEYS/URETERS: Left renal cysts and additional bilateral hypodensities   too small characterize. No hydronephrosis.    BLADDER: Within normal limits.  REPRODUCTIVE ORGANS: Fibroid uterus. A 4.0 cm right adnexal cyst.    BOWEL: No bowel obstruction. Appendix is normal.  PERITONEUM: No ascites.  VESSELS: Atherosclerotic changes.  RETROPERITONEUM/LYMPH NODES: No lymphadenopathy.  ABDOMINAL WALL: Small fat-containing umbilicalhernia.  BONES: Degenerative changes.    IMPRESSION:  Cardiomegaly and moderate pulmonary edema.    No main, right main, left main, lobar or segmental pulmonary embolism.   Limited evaluation of subsegmental pulmonary arteries secondary to motion   and mixing artifact..        --- End of Report ---          LIZZ NATHAN MD; Resident Radiologist  This document has been electronically signed.  NICHOLAS RITCHIE MD; Attending Radiologist  This document has been electronically signed. Oct 10 2022 10:35PM    < end of copied text >         No growth to date.    10-14 @ 23:30 .Blood Blood-Peripheral                No growth to date.          RESPIRATORY CULTURES:          Studies  Chest X-RAY  CT SCAN Chest   Venous Dopplers: LE:   CT Abdomen  Others

## 2022-10-16 NOTE — PROVIDER CONTACT NOTE (OTHER) - SITUATION
Pt c/o chills noted to be febrile with rectal temp 102.2 and hypertensive
Patient on 2L-4L NC,  maintained. Multiple desaturations noted, desatting to 70s, when patient noted to be napping throughout shift.
Bedside dysphagia screening performed and passed

## 2022-10-16 NOTE — PROGRESS NOTE ADULT - ASSESSMENT
Ms Leavitt is a 72 yo Female with PMHx aneurysm, HTN, DM Type 2, HLD, h/o Covid-19  (residual symptoms, home oxygen intermittently, NC 2L/min PRN, diagnosed with chronic lung disease 2/2 covid-19) who presented to the ED with c/o coughing up bright red blood x 2 episodes on 10/11. Reports 3 weeks of coughing with worsening SOB and associated worsening bilateral lower extremity swelling. Pt was admitted for workup of possible CHF and hemoptysis. So far ECHO, Bronch and Flex larngyoscopy negative. Pt has developed severe headache and neck pain and was being worked up for that when she spiked fever overnight 12/14 and 12/15 and ID consulted for the same.     WORKUP  UA (10/15): Nitrite: Negative, Leuk Esterase: Large WBC 69 /HPF / Bacteria: Few  RVP: Negative x 2  CXR (10/10): No pneumothorax. Questionable left infiltrate  CTA Chest and CT a/p with Con (!0/10): Cardiomegaly and moderate pulmonary edema.  Echocardiogram reviewed -- normal biventricular systolic function    DIAGNOSIS and IMPRESSION  ·	Febrile illness   ·	Headache  ·	Hemoptysis (Negative)  ·	Chronic Lung Disease secondary to COVID .   ·	Allergy: Penicillin     Afebrile on admission, however spiked a fever to 102 overnight 10/14  w/o leukocytosis  UA with some pyuria and CXR with questionable infiltrate due to significant pulm edema  noted to have Pulmonary edema on CT scan on admission, however ECHO was with normal biventricular function.  Hemoptysis resolved and Bronch and flex laryngoscopy negative  CTA acute right infarct. CT today no infarct.     Unclear source for fever -> Will f/u cultures  would send sputum culture  Afebrile today on empiric cefepime 2gm Q8   Trend WBC and monitor for fevers        Phi Patel MD, PGY5  ID fellow  [employer] Preferred  After 5pm/weekends call [phone]

## 2022-10-16 NOTE — PROGRESS NOTE ADULT - SUBJECTIVE AND OBJECTIVE BOX
Date of Service  : 10-16-22 @ 13:11    INTERVAL HPI/OVERNIGHT EVENTS: No new concerns.   Vital Signs Last 24 Hrs  T(C): 36.8 (16 Oct 2022 10:44), Max: 38.6 (15 Oct 2022 17:37)  T(F): 98.2 (16 Oct 2022 10:44), Max: 101.4 (15 Oct 2022 17:37)  HR: 82 (16 Oct 2022 10:44) (77 - 100)  BP: 140/89 (16 Oct 2022 10:44) (140/89 - 157/96)  BP(mean): 106 (16 Oct 2022 07:00) (106 - 106)  RR: 19 (16 Oct 2022 10:44) (18 - 20)  SpO2: 96% (16 Oct 2022 10:44) (96% - 98%)    Parameters below as of 16 Oct 2022 10:44  Patient On (Oxygen Delivery Method): nasal cannula  O2 Flow (L/min): 4    I&O's Summary    15 Oct 2022 07:01  -  16 Oct 2022 07:00  --------------------------------------------------------  IN: 1170 mL / OUT: 3250 mL / NET: -2080 mL      MEDICATIONS  (STANDING):  aspirin enteric coated 81 milliGRAM(s) Oral daily  atorvastatin 40 milliGRAM(s) Oral at bedtime  budesonide 160 MICROgram(s)/formoterol 4.5 MICROgram(s) Inhaler 2 Puff(s) Inhalation two times a day  cefepime   IVPB      cefepime   IVPB 2000 milliGRAM(s) IV Intermittent every 8 hours  dextrose 5%. 1000 milliLiter(s) (50 mL/Hr) IV Continuous <Continuous>  dextrose 5%. 1000 milliLiter(s) (100 mL/Hr) IV Continuous <Continuous>  dextrose 50% Injectable 25 Gram(s) IV Push once  dextrose 50% Injectable 12.5 Gram(s) IV Push once  dextrose 50% Injectable 25 Gram(s) IV Push once  glucagon  Injectable 1 milliGRAM(s) IntraMuscular once  heparin   Injectable 5000 Unit(s) SubCutaneous every 12 hours  hydrALAZINE 25 milliGRAM(s) Oral three times a day  influenza  Vaccine (HIGH DOSE) 0.7 milliLiter(s) IntraMuscular once  insulin glargine Injectable (LANTUS) 32 Unit(s) SubCutaneous every morning  insulin lispro (ADMELOG) corrective regimen sliding scale   SubCutaneous three times a day before meals  insulin lispro (ADMELOG) corrective regimen sliding scale   SubCutaneous at bedtime  insulin lispro Injectable (ADMELOG) 10 Unit(s) SubCutaneous three times a day before meals  losartan 100 milliGRAM(s) Oral daily  metoprolol tartrate 25 milliGRAM(s) Oral two times a day  multivitamin 1 Tablet(s) Oral daily  NIFEdipine XL 30 milliGRAM(s) Oral daily    MEDICATIONS  (PRN):  acetaminophen     Tablet .. 650 milliGRAM(s) Oral every 6 hours PRN Temp greater or equal to 38C (100.4F), Mild Pain (1 - 3)  ALBUTerol    90 MICROgram(s) HFA Inhaler 1 Puff(s) Inhalation every 6 hours PRN Shortness of Breath  cyclobenzaprine 5 milliGRAM(s) Oral three times a day PRN Muscle Spasm  dextrose Oral Gel 15 Gram(s) Oral once PRN Blood Glucose LESS THAN 70 milliGRAM(s)/deciliter  melatonin 3 milliGRAM(s) Oral at bedtime PRN Insomnia  ondansetron Injectable 4 milliGRAM(s) IV Push every 8 hours PRN Nausea and/or Vomiting    LABS:                        11.6   9.69  )-----------( 252      ( 16 Oct 2022 05:55 )             38.4     10-16    137  |  100  |  13  ----------------------------<  122<H>  4.3   |  32<H>  |  0.81    Ca    8.7      16 Oct 2022 05:55  Phos  3.3     10-16  Mg     2.20     10-        Urinalysis Basic - ( 15 Oct 2022 05:00 )    Color: Light Yellow / Appearance: Clear / S.009 / pH: x  Gluc: x / Ketone: Negative  / Bili: Negative / Urobili: <2 mg/dL   Blood: x / Protein: Trace / Nitrite: Negative   Leuk Esterase: Large / RBC: 0 /HPF / WBC 69 /HPF   Sq Epi: x / Non Sq Epi: 1 /HPF / Bacteria: Few      CAPILLARY BLOOD GLUCOSE      POCT Blood Glucose.: 126 mg/dL (16 Oct 2022 11:32)  POCT Blood Glucose.: 133 mg/dL (16 Oct 2022 07:55)  POCT Blood Glucose.: 122 mg/dL (16 Oct 2022 07:21)  POCT Blood Glucose.: 129 mg/dL (15 Oct 2022 16:38)        Urinalysis Basic - ( 15 Oct 2022 05:00 )    Color: Light Yellow / Appearance: Clear / S.009 / pH: x  Gluc: x / Ketone: Negative  / Bili: Negative / Urobili: <2 mg/dL   Blood: x / Protein: Trace / Nitrite: Negative   Leuk Esterase: Large / RBC: 0 /HPF / WBC 69 /HPF   Sq Epi: x / Non Sq Epi: 1 /HPF / Bacteria: Few      REVIEW OF SYSTEMS:  CONSTITUTIONAL: No fever, weight loss, or fatigue  EYES: No eye pain, visual disturbances, or discharge  ENMT:  No difficulty hearing, tinnitus, vertigo; No sinus or throat pain  NECK: No pain or stiffness  RESPIRATORY: No cough, wheezing, chills or hemoptysis; No shortness of breath  CARDIOVASCULAR: No chest pain, palpitations, dizziness, or leg swelling  GASTROINTESTINAL: No abdominal or epigastric pain. No nausea, vomiting, or hematemesis; No diarrhea or constipation. No melena or hematochezia.  GENITOURINARY: No dysuria, frequency, hematuria, or incontinence  NEUROLOGICAL: No headaches, memory loss, loss of strength, numbness, or tremors      Consultant(s) Notes Reviewed:  [x ] YES  [ ] NO    PHYSICAL EXAM:  GENERAL: NAD, well-groomed, well-developed,not in any distress ,  HEAD:  Atraumatic, Normocephalic  NECK: Supple, No JVD, Normal thyroid  NERVOUS SYSTEM:  Alert & Oriented X3, No focal deficit   CHEST/LUNG: Good air entry bilateral with no  rales, rhonchi, wheezing, or rubs  HEART: Regular rate and rhythm; No murmurs, rubs, or gallops  ABDOMEN: Soft, Nontender, Nondistended; Bowel sounds present  EXTREMITIES:  2+ Peripheral Pulses, No clubbing, cyanosis, or edema    Care Discussed with Consultants/Other Providers [ x] YES  [ ] NO

## 2022-10-16 NOTE — PROVIDER CONTACT NOTE (OTHER) - REASON
Pt febrile and hypertensive
Bedside dysphagia screening performed and passed
Spo2 monitoring - desaturations on 2L

## 2022-10-16 NOTE — PROVIDER CONTACT NOTE (OTHER) - ASSESSMENT
Bedside dysphagia screening performed. Patient passed. No c/o of cough during exam. There was no wet cough, choking or difficulties observed during screening.
Patient A&Ox4, denies SOB and chest pain. Patient c/o R sided HA (team also aware, CT pending, pain meds administered). Once patient awakes, Spo2 >95%. Patient awake on 2L NC, Spo2 >95%.
Patient A&O x4, reports chills, ACP Arrasate, S notified, ordered rectal temp, pt noted to be febrile, see flowsheet for V/S, sepsis workup ordered

## 2022-10-16 NOTE — PROGRESS NOTE ADULT - ASSESSMENT
70yo Female with MHx aneurysm, HTN, DM Type 2, HLD, h/o Covid-19  (residual symptoms, home oxygen intermittently, NC 2L/min PRN, diagnosed with chronic lung disease 2/2 covid-19) c/o coughing up bright red blood today x 2 episodes. Pt provides a white towel with several bloody stains (2x2cm) from home and a bottle with a blood clot (0.5x2cm in size).  No prior episodes like that in the past. Reports 3 weeks of coughing with worsening SOB. Also reports associated worsening bilateral lower extremity swelling. States has had no palpitations, CP, fevers, Abd pain, n/v/d; Of note, patient was treated with Azithromycin x 5 days by PCP with no relief. Reports no recent travel, no hx of TB, non-smoker. I feel fine today .      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   ·  Plan: -On Oxygen .  Cardiology and pulmonary  helping.   < from: Transthoracic Echocardiogram (10.11.22 @ 07:20) >  CONCLUSIONS:  1. Mitral annular calcification, otherwise normal mitral  valve. Minimal mitral regurgitation.  2. Endocardium not well visualized; grossly normal left  ventricular systolic function.  3. The right ventricle is not well visualized; grossly  normal right ventricular systolic function.    < end of copied text >       Problem/Plan - 2:  ·  Problem: Hemoptysis.   ·  Plan: S/P  Bronchoscopy and ENT evaluation.   CTA chest: No main, right main, left main, lobar or segmental pulmonary embolism. Limited evaluation of subsegmental pulmonary arteries secondary to motion and mixing artifact. Patent central airways. Diffuse bilateral   groundglass opacities and interlobular septal thickening consistent with pulmonary edema. No pleural effusion.  -Hold Heparin for DVT  -Pulmonology following.   -Supp O2 NC  -VS q4h  -Elev HOB.     Problem/Plan - 3:  ·  Problem: Type 2 diabetes mellitus with hyperglycemia, with long-term current use of insulin.   ·  Plan: -Sugars fine.  Conservative insulin dosage: Lantus 40u -->32u in AM, Novolog 25u TID --> 10u TID while inpt  -monitor closely FS (pt states that skips insulin sometimes due to low BG)   -moderate IASS  -a1c  -DM diet.     Problem/Plan - 4:  ·  Problem: Headache and Neck Pain .   ·  Plan: Now resolved. Rpt CT head results pending.   Neurology helping.   < from: CT Angio Head w/ IV Cont (10.15.22 @ 11:24) >  IMPRESSION:  CT BRAIN:  Acute right cerebellar infarct suspected as described above.    CT ANGIOGRAPHY NECK:  No evidence of hemodynamically significant stenosis using NASCET   criteria. Patent vertebral arteries. No evidence of vascular dissection.    CT ANGIOGRAPHY BRAIN:  No major vessel occlusion or proximal stenosis.    < end of copied text >       Problem/Plan - 5:  ·  Problem: FEVER .   ·  Plan: Id Helping . Abxs started.     Dispo : DC planning pending above.

## 2022-10-16 NOTE — PROGRESS NOTE ADULT - SUBJECTIVE AND OBJECTIVE BOX
Follow Up:  fever    Interval History/ROS:   feels better today except for slight headache   no hemoptysis   afebrile   RN drawing blood    Allergies  penicillin (Unknown)        ANTIMICROBIALS:  cefepime   IVPB    cefepime   IVPB 2000 every 8 hours      OTHER MEDS:  acetaminophen     Tablet .. 650 milliGRAM(s) Oral every 6 hours PRN  ALBUTerol    90 MICROgram(s) HFA Inhaler 1 Puff(s) Inhalation every 6 hours PRN  aspirin enteric coated 81 milliGRAM(s) Oral daily  atorvastatin 40 milliGRAM(s) Oral at bedtime  budesonide 160 MICROgram(s)/formoterol 4.5 MICROgram(s) Inhaler 2 Puff(s) Inhalation two times a day  cyclobenzaprine 5 milliGRAM(s) Oral three times a day PRN  dextrose 5%. 1000 milliLiter(s) IV Continuous <Continuous>  dextrose 5%. 1000 milliLiter(s) IV Continuous <Continuous>  dextrose 50% Injectable 25 Gram(s) IV Push once  dextrose 50% Injectable 25 Gram(s) IV Push once  dextrose 50% Injectable 12.5 Gram(s) IV Push once  dextrose Oral Gel 15 Gram(s) Oral once PRN  glucagon  Injectable 1 milliGRAM(s) IntraMuscular once  heparin   Injectable 5000 Unit(s) SubCutaneous every 12 hours  hydrALAZINE 25 milliGRAM(s) Oral three times a day  influenza  Vaccine (HIGH DOSE) 0.7 milliLiter(s) IntraMuscular once  insulin glargine Injectable (LANTUS) 32 Unit(s) SubCutaneous every morning  insulin lispro (ADMELOG) corrective regimen sliding scale   SubCutaneous three times a day before meals  insulin lispro (ADMELOG) corrective regimen sliding scale   SubCutaneous at bedtime  insulin lispro Injectable (ADMELOG) 10 Unit(s) SubCutaneous three times a day before meals  losartan 100 milliGRAM(s) Oral daily  melatonin 3 milliGRAM(s) Oral at bedtime PRN  metoprolol tartrate 25 milliGRAM(s) Oral two times a day  multivitamin 1 Tablet(s) Oral daily  NIFEdipine XL 30 milliGRAM(s) Oral daily  ondansetron Injectable 4 milliGRAM(s) IV Push every 8 hours PRN      Vital Signs Last 24 Hrs  T(C): 36.8 (16 Oct 2022 10:44), Max: 38.6 (15 Oct 2022 17:37)  T(F): 98.2 (16 Oct 2022 10:44), Max: 101.4 (15 Oct 2022 17:37)  HR: 82 (16 Oct 2022 10:44) (77 - 100)  BP: 140/89 (16 Oct 2022 10:44) (140/89 - 157/96)  BP(mean): 106 (16 Oct 2022 07:00) (106 - 106)  RR: 19 (16 Oct 2022 10:44) (18 - 20)  SpO2: 96% (16 Oct 2022 10:44) (96% - 98%)    Parameters below as of 16 Oct 2022 10:44  Patient On (Oxygen Delivery Method): nasal cannula  O2 Flow (L/min): 4      PHYSICAL EXAM:  Constitutional: Non toxic resting comfortably  Eyes: No icterus.  Oral cavity: Clear, no lesions  Neck: Supple  RS: no distress nasal O2  CVS: S1, S2   Abdomen: Soft. No guarding/rigidity/tenderness.  : no avendaño  Extremities: Warm. No pedal edema  Skin: No rash  Neuro: Alert, oriented to time/place/person  Cranial nerves 2-12 grossly normal. No focal abnormalities                          11.6   9.69  )-----------( 252      ( 16 Oct 2022 05:55 )             38.4       10-16    137  |  100  |  13  ----------------------------<  122<H>  4.3   |  32<H>  |  0.81    Ca    8.7      16 Oct 2022 05:55  Phos  3.3     10-16  Mg     2.20     10-16        Urinalysis Basic - ( 15 Oct 2022 05:00 )    Color: Light Yellow / Appearance: Clear / S.009 / pH: x  Gluc: x / Ketone: Negative  / Bili: Negative / Urobili: <2 mg/dL   Blood: x / Protein: Trace / Nitrite: Negative   Leuk Esterase: Large / RBC: 0 /HPF / WBC 69 /HPF   Sq Epi: x / Non Sq Epi: 1 /HPF / Bacteria: Few        MICROBIOLOGY:  v  .Blood Blood-Peripheral  10-15-22   No growth to date.  --  --      .Blood Blood-Venous  10-14-22   No growth to date.  --  --      .Blood Blood-Peripheral  10-14-22   No growth to date.  --  --          Rapid RVP Result: NotDetec (10-15 @ 00:27)  Rapid RVP Result: NotDetec (10-10 @ 19:09)        RADIOLOGY:    r< from: CT Head No Cont (10.16.22 @ 09:19) >  IMPRESSION: No definite infarct in the right cerebellum, previous lucency   may be related to artifact on 10/15/2022.    --- End of Report ---        < end of copied text >  < from: CT Angio Neck w/ IV Cont (10.15.22 @ 11:23) >  IMPRESSION:  CT BRAIN:  Acute right cerebellar infarct suspected as described above.    CT ANGIOGRAPHY NECK:  No evidence of hemodynamically significant stenosis using NASCET   criteria. Patent vertebral arteries. No evidence of vascular dissection.    CT ANGIOGRAPHY BRAIN:  No major vessel occlusion or proximal stenosis.  1.  --- End of Report ---    < end of copied text >  < from: Xray Chest 1 View- PORTABLE-Routine (Xray Chest 1 View- PORTABLE-Routine in AM.) (10.14.22 @ 08:25) >  IMPRESSION:  No pneumothorax. Questionable left infiltrate. Follow-up study is   recommended as clinically warranted.      < end of copied text >

## 2022-10-16 NOTE — PROVIDER CONTACT NOTE (OTHER) - BACKGROUND
Patient admitted for SOB on 2L NC at baseline. Previously having hemoptysis which subsides on 10/13. hx of DM2, CHF, CVA.
Covid-19 w/ residual symptoms and Home oxygen intermittently. Chronic lung disease 2/2 to covid.CHF, HTN, DM2
70yo F w/ MHx HTN, DM Type 2, HLD, CVA, h/o Covid-19 diagnosed with chronic lung disease 2/2 covid-19) a/w hemoptysis with initial c/f additional CHF exacerbation

## 2022-10-17 LAB
ANION GAP SERPL CALC-SCNC: 8 MMOL/L — SIGNIFICANT CHANGE UP (ref 7–14)
BUN SERPL-MCNC: 13 MG/DL — SIGNIFICANT CHANGE UP (ref 7–23)
CALCIUM SERPL-MCNC: 8.7 MG/DL — SIGNIFICANT CHANGE UP (ref 8.4–10.5)
CHLORIDE SERPL-SCNC: 97 MMOL/L — LOW (ref 98–107)
CO2 SERPL-SCNC: 33 MMOL/L — HIGH (ref 22–31)
CREAT SERPL-MCNC: 0.71 MG/DL — SIGNIFICANT CHANGE UP (ref 0.5–1.3)
EGFR: 91 ML/MIN/1.73M2 — SIGNIFICANT CHANGE UP
GLUCOSE BLDC GLUCOMTR-MCNC: 110 MG/DL — HIGH (ref 70–99)
GLUCOSE BLDC GLUCOMTR-MCNC: 113 MG/DL — HIGH (ref 70–99)
GLUCOSE BLDC GLUCOMTR-MCNC: 144 MG/DL — HIGH (ref 70–99)
GLUCOSE BLDC GLUCOMTR-MCNC: 90 MG/DL — SIGNIFICANT CHANGE UP (ref 70–99)
GLUCOSE SERPL-MCNC: 119 MG/DL — HIGH (ref 70–99)
HCT VFR BLD CALC: 37 % — SIGNIFICANT CHANGE UP (ref 34.5–45)
HGB BLD-MCNC: 11.6 G/DL — SIGNIFICANT CHANGE UP (ref 11.5–15.5)
MAGNESIUM SERPL-MCNC: 2.2 MG/DL — SIGNIFICANT CHANGE UP (ref 1.6–2.6)
MCHC RBC-ENTMCNC: 27.4 PG — SIGNIFICANT CHANGE UP (ref 27–34)
MCHC RBC-ENTMCNC: 31.4 GM/DL — LOW (ref 32–36)
MCV RBC AUTO: 87.3 FL — SIGNIFICANT CHANGE UP (ref 80–100)
NRBC # BLD: 0 /100 WBCS — SIGNIFICANT CHANGE UP (ref 0–0)
NRBC # FLD: 0 K/UL — SIGNIFICANT CHANGE UP (ref 0–0)
PHOSPHATE SERPL-MCNC: 3 MG/DL — SIGNIFICANT CHANGE UP (ref 2.5–4.5)
PLATELET # BLD AUTO: 244 K/UL — SIGNIFICANT CHANGE UP (ref 150–400)
POTASSIUM SERPL-MCNC: 3.9 MMOL/L — SIGNIFICANT CHANGE UP (ref 3.5–5.3)
POTASSIUM SERPL-SCNC: 3.9 MMOL/L — SIGNIFICANT CHANGE UP (ref 3.5–5.3)
RBC # BLD: 4.24 M/UL — SIGNIFICANT CHANGE UP (ref 3.8–5.2)
RBC # FLD: 14.4 % — SIGNIFICANT CHANGE UP (ref 10.3–14.5)
SODIUM SERPL-SCNC: 138 MMOL/L — SIGNIFICANT CHANGE UP (ref 135–145)
WBC # BLD: 9.21 K/UL — SIGNIFICANT CHANGE UP (ref 3.8–10.5)
WBC # FLD AUTO: 9.21 K/UL — SIGNIFICANT CHANGE UP (ref 3.8–10.5)

## 2022-10-17 PROCEDURE — 99232 SBSQ HOSP IP/OBS MODERATE 35: CPT

## 2022-10-17 RX ORDER — CEFEPIME 1 G/1
1000 INJECTION, POWDER, FOR SOLUTION INTRAMUSCULAR; INTRAVENOUS EVERY 8 HOURS
Refills: 0 | Status: DISCONTINUED | OUTPATIENT
Start: 2022-10-17 | End: 2022-10-18

## 2022-10-17 RX ADMIN — LOSARTAN POTASSIUM 100 MILLIGRAM(S): 100 TABLET, FILM COATED ORAL at 05:04

## 2022-10-17 RX ADMIN — Medication 650 MILLIGRAM(S): at 10:36

## 2022-10-17 RX ADMIN — Medication 3 MILLIGRAM(S): at 21:23

## 2022-10-17 RX ADMIN — ATORVASTATIN CALCIUM 40 MILLIGRAM(S): 80 TABLET, FILM COATED ORAL at 21:23

## 2022-10-17 RX ADMIN — Medication 25 MILLIGRAM(S): at 05:04

## 2022-10-17 RX ADMIN — CEFEPIME 100 MILLIGRAM(S): 1 INJECTION, POWDER, FOR SOLUTION INTRAMUSCULAR; INTRAVENOUS at 05:02

## 2022-10-17 RX ADMIN — HEPARIN SODIUM 5000 UNIT(S): 5000 INJECTION INTRAVENOUS; SUBCUTANEOUS at 05:02

## 2022-10-17 RX ADMIN — Medication 1 TABLET(S): at 10:08

## 2022-10-17 RX ADMIN — BUDESONIDE AND FORMOTEROL FUMARATE DIHYDRATE 2 PUFF(S): 160; 4.5 AEROSOL RESPIRATORY (INHALATION) at 08:00

## 2022-10-17 RX ADMIN — Medication 10 UNIT(S): at 12:02

## 2022-10-17 RX ADMIN — Medication 650 MILLIGRAM(S): at 04:32

## 2022-10-17 RX ADMIN — Medication 25 MILLIGRAM(S): at 02:38

## 2022-10-17 RX ADMIN — BUDESONIDE AND FORMOTEROL FUMARATE DIHYDRATE 2 PUFF(S): 160; 4.5 AEROSOL RESPIRATORY (INHALATION) at 21:22

## 2022-10-17 RX ADMIN — Medication 10 UNIT(S): at 08:01

## 2022-10-17 RX ADMIN — Medication 25 MILLIGRAM(S): at 17:12

## 2022-10-17 RX ADMIN — Medication 650 MILLIGRAM(S): at 05:32

## 2022-10-17 RX ADMIN — CEFEPIME 100 MILLIGRAM(S): 1 INJECTION, POWDER, FOR SOLUTION INTRAMUSCULAR; INTRAVENOUS at 13:11

## 2022-10-17 RX ADMIN — Medication 10 UNIT(S): at 17:11

## 2022-10-17 RX ADMIN — Medication 30 MILLIGRAM(S): at 05:04

## 2022-10-17 RX ADMIN — Medication 81 MILLIGRAM(S): at 10:09

## 2022-10-17 RX ADMIN — Medication 650 MILLIGRAM(S): at 11:30

## 2022-10-17 RX ADMIN — HEPARIN SODIUM 5000 UNIT(S): 5000 INJECTION INTRAVENOUS; SUBCUTANEOUS at 17:12

## 2022-10-17 RX ADMIN — INSULIN GLARGINE 32 UNIT(S): 100 INJECTION, SOLUTION SUBCUTANEOUS at 08:01

## 2022-10-17 RX ADMIN — CEFEPIME 100 MILLIGRAM(S): 1 INJECTION, POWDER, FOR SOLUTION INTRAMUSCULAR; INTRAVENOUS at 21:26

## 2022-10-17 RX ADMIN — Medication 25 MILLIGRAM(S): at 10:07

## 2022-10-17 NOTE — DIETITIAN INITIAL EVALUATION ADULT - NS FNS DIET ORDER
From: [facility]  To:  Lakia Perez 
Diet, Regular:   Consistent Carbohydrate {Evening Snack} (CSTCHOSN)  DASH/TLC {Sodium & Cholesterol Restricted} (DASH)  1500mL Fluid Restriction ([id number])  Low Sodium (10-16-22 @ 13:44) [Active]

## 2022-10-17 NOTE — PROGRESS NOTE ADULT - ASSESSMENT
70yo Female with MHx aneurysm, HTN, DM Type 2, HLD, h/o Covid-19  (residual symptoms, home oxygen intermittently, NC 2L/min PRN, diagnosed with chronic lung disease 2/2 covid-19) c/o coughing up bright red blood today x 2 episodes. Pt provides a white towel with several bloody stains (2x2cm) from home and a bottle with a blood clot (0.5x2cm in size).  No prior episodes like that in the past. Reports 3 weeks of coughing with worsening SOB. Also reports associated worsening bilateral lower extremity swelling. States has had no palpitations, CP, fevers, Abd pain, n/v/d; Of note, patient was treated with Azithromycin x 5 days by PCP with no relief. Reports no recent travel, no hx of TB, non-smoker. I feel fine today .      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   ·  Plan: -On Oxygen .  Cardiology and pulmonary  helping.   < from: Transthoracic Echocardiogram (10.11.22 @ 07:20) >  CONCLUSIONS:  1. Mitral annular calcification, otherwise normal mitral  valve. Minimal mitral regurgitation.  2. Endocardium not well visualized; grossly normal left  ventricular systolic function.  3. The right ventricle is not well visualized; grossly  normal right ventricular systolic function.    < end of copied text >       Problem/Plan - 2:  ·  Problem: Hemoptysis.   ·  Plan: S/P  Bronchoscopy and ENT evaluation.   CTA chest: No main, right main, left main, lobar or segmental pulmonary embolism. Limited evaluation of subsegmental pulmonary arteries secondary to motion and mixing artifact. Patent central airways. Diffuse bilateral   groundglass opacities and interlobular septal thickening consistent with pulmonary edema. No pleural effusion.  -Hold Heparin for DVT  -Pulmonology following.   -Supp O2 NC  -VS q4h  -Elev HOB.     Problem/Plan - 3:  ·  Problem: Type 2 diabetes mellitus with hyperglycemia, with long-term current use of insulin.   ·  Plan: -Sugars fine.  Conservative insulin dosage: Lantus 40u -->32u in AM, Novolog 25u TID --> 10u TID while inpt  -monitor closely FS (pt states that skips insulin sometimes due to low BG)   -moderate IASS  -a1c  -DM diet.     Problem/Plan - 4:  ·  Problem: Headache and Neck Pain .   ·  Plan: Now resolved. Rpt CT head results noted.   Neurology helping.   < from: CT Angio Head w/ IV Cont (10.15.22 @ 11:24) >  IMPRESSION:  CT BRAIN:  Acute right cerebellar infarct suspected as described above.    CT ANGIOGRAPHY NECK:  No evidence of hemodynamically significant stenosis using NASCET   criteria. Patent vertebral arteries. No evidence of vascular dissection.    CT ANGIOGRAPHY BRAIN:  No major vessel occlusion or proximal stenosis.    < end of copied text >       Problem/Plan - 5:  ·  Problem: Pneumonia .   ·  Plan: Id Helping . Abxs started.     Dispo : DC planning pending ID clearance.

## 2022-10-17 NOTE — DIETITIAN INITIAL EVALUATION ADULT - PERTINENT LABORATORY DATA
10-17 Na 138 mmol/L Glu 119 mg/dL<H> K+ 3.9 mmol/L Cr 0.71 mg/dL BUN 13 mg/dL Phos 3.0 mg/dL  10-17 @ 16:37 POCT 90 mg/dL  A1C with Estimated Average Glucose Result: 7.9 % (10-16-22 @ 05:55)  A1C with Estimated Average Glucose Result: 8.0 % (10-11-22 @ 07:14)

## 2022-10-17 NOTE — CONSULT NOTE ADULT - CONSULT REQUESTED DATE/TIME
15-Oct-2022 15:09
11-Oct-2022 13:30
12-Oct-2022 12:39
11-Oct-2022 06:02
14-Oct-2022 13:59
14-Oct-2022 13:59
15-Oct-2022 09:55
11-Oct-2022 11:16

## 2022-10-17 NOTE — PROGRESS NOTE ADULT - ASSESSMENT
Ms Leavitt is a 72 yo Female with PMHx aneurysm, HTN, DM Type 2, HLD, h/o Covid-19  (residual symptoms, home oxygen intermittently, NC 2L/min PRN, diagnosed with chronic lung disease 2/2 covid-19) who presented to the ED with c/o coughing up bright red blood x 2 episodes on 10/11. Reports 3 weeks of coughing with worsening SOB and associated worsening bilateral lower extremity swelling. Pt was admitted for workup of possible CHF and hemoptysis. So far ECHO, Bronch and Flex larngyoscopy negative. Pt has developed severe headache and neck pain and was being worked up for that when she spiked fever overnight 12/14 and 12/15 and ID consulted for the same.     WORKUP  UA (10/15): Nitrite: Negative, Leuk Esterase: Large WBC 69 /HPF / Bacteria: Few  RVP: Negative x 2  CXR (10/10): No pneumothorax. Questionable left infiltrate  CTA Chest and CT a/p with Con (!0/10): Cardiomegaly and moderate pulmonary edema.  Echocardiogram reviewed -- normal biventricular systolic function    DIAGNOSIS and IMPRESSION  ·	Febrile illness   ·	Headache  ·	Hemoptysis   ·	Chronic Lung Disease secondary to COVID .   ·	Allergy: Penicillin     Afebrile on admission, however spiked a fever to 102 overnight 10/14  w/o leukocytosis   CXR with questionable infiltrate due to significant pulm edema  noted to have Pulmonary edema on CT scan on admission, however ECHO was with normal biventricular function.  Hemoptysis resolved and Bronch and flex laryngoscopy negative    CTA acute right infarct. CT 10/16 no infarct.     Unclear source for fever which now appear to have resolved  on empiric cefepime 2gm Q8   Possible PNA    Suggest:  decrease cefepime 1 gm iv q 8 h

## 2022-10-17 NOTE — DIETITIAN INITIAL EVALUATION ADULT - ADD RECOMMEND
1) recommend continue CSTCHO, DASH/TLC diet  2) Monitor weights, labs, BM's, skin integrity, p.o. intake.   3) Continue multivitamin once daily for micronutrient provisions.   4) RD available prn.

## 2022-10-17 NOTE — DIETITIAN INITIAL EVALUATION ADULT - OTHER INFO
Medical course: 71 year old Female with MHx aneurysm, HTN, DM Type 2, HLD, CVA, h/o Covid-19  (residual symptoms, home oxygen intermittently, NC 2L/min PRN, diagnosed with chronic lung disease 2/2 covid-19) a/w hemoptysis with initial c/f additional CHF exacerbation now thought to be more consistent with air trapping. Ruled out for PE.    Nutrition interview: Patient A&Ox4. No recent episodes of nausea, vomiting, diarrhea or constipation, last BM noted on 10/17 per RN flowsheets. Prior to that patient had not had a BM in a few days, patient reports she does not feel constipated and would not like a bowel regimen at this time. Denies any chewing/swallowing difficulties. Pateints appetite was poor 2/2 minced and moist diet texture. Dysphagia screening performed on 10/16 and passed, advanced to regular texture diet. Observed eating lunch today with fair intake. No food allergies. Stated UBW: 223#. Intake is % per RN flowsheets. Feeding skills: tray set up. Patient with poorly controlled DM A1c 8%. DM nutrition education and low sodium diet education given to patient.

## 2022-10-17 NOTE — DIETITIAN INITIAL EVALUATION ADULT - LITERATURE/VIDEOS GIVEN
Discussed Heart failure nutrition therapy provided. Discussed daily weights, nutrition label reading, no added salt to foods, limiting fluid intake, and limiting eating out. Patient requested diabetes education printed hand, provided and explained hand out to patient.

## 2022-10-17 NOTE — PROGRESS NOTE ADULT - SUBJECTIVE AND OBJECTIVE BOX
Date of Service: 10-17-22 @ 15:02    Patient is a 71y old  Female who presents with a chief complaint of Hemoptysis (16 Oct 2022 14:18)      Any change in ROS: using 2 L of oxygen :no sob:  no hemoptysis:      MEDICATIONS  (STANDING):  aspirin enteric coated 81 milliGRAM(s) Oral daily  atorvastatin 40 milliGRAM(s) Oral at bedtime  budesonide 160 MICROgram(s)/formoterol 4.5 MICROgram(s) Inhaler 2 Puff(s) Inhalation two times a day  cefepime   IVPB      cefepime   IVPB 2000 milliGRAM(s) IV Intermittent every 8 hours  dextrose 5%. 1000 milliLiter(s) (50 mL/Hr) IV Continuous <Continuous>  dextrose 5%. 1000 milliLiter(s) (100 mL/Hr) IV Continuous <Continuous>  dextrose 50% Injectable 25 Gram(s) IV Push once  dextrose 50% Injectable 12.5 Gram(s) IV Push once  dextrose 50% Injectable 25 Gram(s) IV Push once  glucagon  Injectable 1 milliGRAM(s) IntraMuscular once  heparin   Injectable 5000 Unit(s) SubCutaneous every 12 hours  hydrALAZINE 25 milliGRAM(s) Oral three times a day  influenza  Vaccine (HIGH DOSE) 0.7 milliLiter(s) IntraMuscular once  insulin glargine Injectable (LANTUS) 32 Unit(s) SubCutaneous every morning  insulin lispro (ADMELOG) corrective regimen sliding scale   SubCutaneous three times a day before meals  insulin lispro (ADMELOG) corrective regimen sliding scale   SubCutaneous at bedtime  insulin lispro Injectable (ADMELOG) 10 Unit(s) SubCutaneous three times a day before meals  losartan 100 milliGRAM(s) Oral daily  metoprolol tartrate 25 milliGRAM(s) Oral two times a day  multivitamin 1 Tablet(s) Oral daily  NIFEdipine XL 30 milliGRAM(s) Oral daily    MEDICATIONS  (PRN):  acetaminophen     Tablet .. 650 milliGRAM(s) Oral every 6 hours PRN Temp greater or equal to 38C (100.4F), Mild Pain (1 - 3)  ALBUTerol    90 MICROgram(s) HFA Inhaler 1 Puff(s) Inhalation every 6 hours PRN Shortness of Breath  cyclobenzaprine 5 milliGRAM(s) Oral three times a day PRN Muscle Spasm  dextrose Oral Gel 15 Gram(s) Oral once PRN Blood Glucose LESS THAN 70 milliGRAM(s)/deciliter  melatonin 3 milliGRAM(s) Oral at bedtime PRN Insomnia  ondansetron Injectable 4 milliGRAM(s) IV Push every 8 hours PRN Nausea and/or Vomiting    Vital Signs Last 24 Hrs  T(C): 36.7 (17 Oct 2022 12:10), Max: 37.2 (17 Oct 2022 02:30)  T(F): 98.1 (17 Oct 2022 12:10), Max: 98.9 (17 Oct 2022 02:30)  HR: 82 (17 Oct 2022 12:10) (79 - 98)  BP: 148/76 (17 Oct 2022 12:10) (148/76 - 161/84)  BP(mean): --  RR: 18 (17 Oct 2022 12:10) (18 - 18)  SpO2: 96% (17 Oct 2022 12:10) (91% - 100%)    Parameters below as of 17 Oct 2022 12:10  Patient On (Oxygen Delivery Method): nasal cannula  O2 Flow (L/min): 2      I&O's Summary    16 Oct 2022 07:01  -  17 Oct 2022 07:00  --------------------------------------------------------  IN: 400 mL / OUT: 900 mL / NET: -500 mL          Physical Exam:   GENERAL: NAD, well-groomed, well-developed  HEENT: NORMA/   Atraumatic, Normocephalic  ENMT: No tonsillar erythema, exudates, or enlargement; Moist mucous membranes, Good dentition, No lesions  NECK: Supple, No JVD, Normal thyroid  CHEST/LUNG: Clear to auscultaion  CVS: Regular rate and rhythm; No murmurs, rubs, or gallops  GI: : Soft, Nontender, Nondistended; Bowel sounds present  NERVOUS SYSTEM:  Alert & Oriented X3  EXTREMITIES:  2+ Peripheral Pulses, No clubbing, cyanosis, or edema  LYMPH: No lymphadenopathy noted  SKIN: No rashes or lesions  ENDOCRINOLOGY: No Thyromegaly  PSYCH: Appropriate    Labs:  32                            11.6   9.21  )-----------( 244      ( 17 Oct 2022 05:50 )             37.0                         11.6   9.69  )-----------( 252      ( 16 Oct 2022 05:55 )             38.4                         12.2   9.54  )-----------( 265      ( 15 Oct 2022 06:42 )             40.1                         11.4   9.39  )-----------( 257      ( 14 Oct 2022 23:30 )             37.4                         11.7   9.49  )-----------( 282      ( 14 Oct 2022 04:02 )             38.8     10-17    138  |  97<L>  |  13  ----------------------------<  119<H>  3.9   |  33<H>  |  0.71  10-16    137  |  100  |  13  ----------------------------<  122<H>  4.3   |  32<H>  |  0.81  10-15    134<L>  |  95<L>  |  15  ----------------------------<  124<H>  4.2   |  30  |  0.94  10-14    136  |  96<L>  |  15  ----------------------------<  180<H>  4.6   |  32<H>  |  1.06    Ca    8.7      17 Oct 2022 05:50  Ca    8.7      16 Oct 2022 05:55  Phos  3.0     10-17  Phos  3.3     10-16  Mg     2.20     10-17  Mg     2.20     10-16      CAPILLARY BLOOD GLUCOSE      POCT Blood Glucose.: 110 mg/dL (17 Oct 2022 11:54)  POCT Blood Glucose.: 144 mg/dL (17 Oct 2022 07:28)  POCT Blood Glucose.: 97 mg/dL (16 Oct 2022 22:10)  POCT Blood Glucose.: 141 mg/dL (16 Oct 2022 16:44)      rad< from: CT Angio Head w/ IV Cont (10.15.22 @ 11:24) >  There is no evidence for significant stenosis, major vessel occlusion, or   aneurysm involving the vertebrobasilar system.    No enlarged vascular lesions or clusters of abnormal vessels are noted to   suggest an arterial venous malformation within the field-of-view.    Visualized portions of the superficial and deep venous systems are   unremarkable.      IMPRESSION:  CT BRAIN:  Acute right cerebellar infarct suspected as described above.    CT ANGIOGRAPHY NECK:  No evidence of hemodynamically significant stenosis using NASCET   criteria. Patent vertebral arteries. No evidence of vascular dissection.    CT ANGIOGRAPHY BRAIN:  No major vessel occlusion or proximal stenosis.  1.  --- End of Report ---          ALFRED SAENZ MD; Resident Radiologist  This document has been electronically signed.  ROSSY RODRIGUEZ MD; Attending Radiologist  This document has been electronically signed. Oct 15 2022 11:56AM    < end of copied text >  < from: CT Angio Neck w/ IV Cont (10.15.22 @ 11:23) >  There is no evidence for significant stenosis, major vessel occlusion, or   aneurysm involving the vertebrobasilar system.    No enlarged vascular lesions or clusters of abnormal vessels are noted to   suggest an arterial venous malformation within the field-of-view.    Visualized portions of the superficial and deep venous systems are   unremarkable.      IMPRESSION:  CT BRAIN:  Acute right cerebellar infarct suspected as described above.    CT ANGIOGRAPHY NECK:  No evidence of hemodynamically significant stenosis using NASCET   criteria. Patent vertebral arteries. No evidence of vascular dissection.    CT ANGIOGRAPHY BRAIN:  No major vessel occlusion or proximal stenosis.  1.  --- End of Report ---          ALFRED SAENZ MD; Resident Radiologist  This document has been electronically signed.  ROSSY RODRIGUEZ MD; Attending Radiologist  This document has been electronically signed. Oct 15 2022 11:56AM    < end of copied text >        Procalcitonin, Serum: 0.04 ng/mL (10-14 @ 23:30)  Lactate, Blood: 1.1 mmol/L (10-14 @ 23:30)        RECENT CULTURES:  10-15 @ 10:00 .Blood Blood-Peripheral                No growth to date.    10-14 @ 23:45 .Blood Blood-Venous                No growth to date.    10-14 @ 23:30 .Blood Blood-Peripheral                No growth to date.          RESPIRATORY CULTURES:          Studies  Chest X-RAY  CT SCAN Chest   Venous Dopplers: LE:   CT Abdomen  Others

## 2022-10-17 NOTE — CONSULT NOTE ADULT - ASSESSMENT
70yo Female with MHx aneurysm, HTN, DM Type 2, HLD, h/o Covid-19  (residual symptoms, home oxygen intermittently, NC 2L/min PRN, diagnosed with chronic lung disease 2/2 covid-19) admitted for hemoptysis now resolved, now ENT consulted for R ear pain.   72yo Female with MHx aneurysm, HTN, DM Type 2, HLD, h/o Covid-19  (residual symptoms, home oxygen intermittently, NC 2L/min PRN, diagnosed with chronic lung disease 2/2 covid-19) admitted for hemoptysis now resolved, now ENT consulted for R facial pain. Bilateral ear exam unremarkable. R preauricular and neck region indurated, warm to touch, no drainage from R parotid duct.   - Massage to R parotid gland q4 hours for 15 minutes while awake  - Warm compresses to R parotid gland q4 hours for 20 minutes while awake  - Sialagogues (lemon or sour candies) q4 hours while awake  - NSAIDs for pain if tolerated  - Aggressive PO hydration, consider IVF if unable to tolerate PO  - Clindamycin for parotitis, MRSA coverage   - Page or call with questions

## 2022-10-17 NOTE — PROGRESS NOTE ADULT - SUBJECTIVE AND OBJECTIVE BOX
Follow Up:  fever    Interval History/ROS:   c/o right sided headache    afebrile   no further hemoptysis      Allergies  penicillin (Unknown)        ANTIMICROBIALS:  cefepime   IVPB    cefepime   IVPB 2000 every 8 hours    MEDICATIONS  (STANDING):  acetaminophen     Tablet .. 650 every 6 hours PRN  ALBUTerol    90 MICROgram(s) HFA Inhaler 1 every 6 hours PRN  aspirin enteric coated 81 daily  atorvastatin 40 at bedtime  budesonide 160 MICROgram(s)/formoterol 4.5 MICROgram(s) Inhaler 2 two times a day  cyclobenzaprine 5 three times a day PRN  dextrose 50% Injectable 25 once  dextrose 50% Injectable 12.5 once  dextrose 50% Injectable 25 once  dextrose Oral Gel 15 once PRN  glucagon  Injectable 1 once  heparin   Injectable 5000 every 12 hours  hydrALAZINE 25 three times a day  influenza  Vaccine (HIGH DOSE) 0.7 once  insulin glargine Injectable (LANTUS) 32 every morning  insulin lispro (ADMELOG) corrective regimen sliding scale  three times a day before meals  insulin lispro (ADMELOG) corrective regimen sliding scale  at bedtime  insulin lispro Injectable (ADMELOG) 10 three times a day before meals  losartan 100 daily  melatonin 3 at bedtime PRN  metoprolol tartrate 25 two times a day  NIFEdipine XL 30 daily        Vital Signs Last 24 Hrs  T(F): 98.1 (10-17-22 @ 12:10), Max: 98.9 (10-17-22 @ 02:30)  HR: 82 (10-17-22 @ 12:10)  BP: 148/76 (10-17-22 @ 12:10)  RR: 18 (10-17-22 @ 12:10)  SpO2: 96% (10-17-22 @ 12:10) (91% - 100%)ondansetron Injectable 4 every 8 hours PRN      PHYSICAL EXAM:  Constitutional: Non toxic   Eyes: No icterus.  Oral cavity: Clear, no lesions  Neck: Supple  RS: no distress nasal O2  CVS: S1, S2   Abdomen: Soft. No guarding/rigidity/tenderness.  : no avendaño  Skin: No rash  Neuro: Alert, oriented to time/place/person  Cranial nerves 2-12 grossly normal. No focal abnormalities                          11.6   9.21  )-----------( 244      ( 17 Oct 2022 05:50 )             37.0 10-17    138  |  97  |  13  ----------------------------<  119  3.9   |  33  |  0.71  Ca    8.7      17 Oct 2022 05:50Phos  3.0     10-17Mg     2.20     10-17          MICROBIOLOGY:    .Blood Blood-Peripheral  10-15-22   No growth to date.  --  --      .Blood Blood-Venous  10-14-22   No growth to date.  --  --      .Blood Blood-Peripheral  10-14-22   No growth to date.  --  --          Rapid RVP Result: NotDetec (10-15 @ 00:27)  Rapid RVP Result: NotDetec (10-10 @ 19:09)        RADIOLOGY:    r< from: CT Head No Cont (10.16.22 @ 09:19) >  IMPRESSION: No definite infarct in the right cerebellum, previous lucency   may be related to artifact on 10/15/2022.    --- End of Report ---        < end of copied text >  < from: CT Angio Neck w/ IV Cont (10.15.22 @ 11:23) >  IMPRESSION:  CT BRAIN:  Acute right cerebellar infarct suspected as described above.    CT ANGIOGRAPHY NECK:  No evidence of hemodynamically significant stenosis using NASCET   criteria. Patent vertebral arteries. No evidence of vascular dissection.    CT ANGIOGRAPHY BRAIN:  No major vessel occlusion or proximal stenosis.  1.  --- End of Report ---    < end of copied text >  < from: Xray Chest 1 View- PORTABLE-Routine (Xray Chest 1 View- PORTABLE-Routine in AM.) (10.14.22 @ 08:25) >  IMPRESSION:  No pneumothorax. Questionable left infiltrate. Follow-up study is   recommended as clinically warranted.      < end of copied text >

## 2022-10-17 NOTE — PROGRESS NOTE ADULT - SUBJECTIVE AND OBJECTIVE BOX
Date of Service  : 10-17-22     INTERVAL HPI/OVERNIGHT EVENTS: I feel better.   Vital Signs Last 24 Hrs  T(C): 36.7 (17 Oct 2022 16:10), Max: 37.2 (17 Oct 2022 02:30)  T(F): 98 (17 Oct 2022 16:10), Max: 98.9 (17 Oct 2022 02:30)  HR: 90 (17 Oct 2022 16:10) (79 - 91)  BP: 143/86 (17 Oct 2022 16:10) (143/86 - 161/84)  BP(mean): --  RR: 16 (17 Oct 2022 16:10) (16 - 18)  SpO2: 98% (17 Oct 2022 16:10) (91% - 100%)    Parameters below as of 17 Oct 2022 16:10  Patient On (Oxygen Delivery Method): nasal cannula  O2 Flow (L/min): 2    I&O's Summary    16 Oct 2022 07:01  -  17 Oct 2022 07:00  --------------------------------------------------------  IN: 400 mL / OUT: 900 mL / NET: -500 mL      MEDICATIONS  (STANDING):  aspirin enteric coated 81 milliGRAM(s) Oral daily  atorvastatin 40 milliGRAM(s) Oral at bedtime  budesonide 160 MICROgram(s)/formoterol 4.5 MICROgram(s) Inhaler 2 Puff(s) Inhalation two times a day  cefepime   IVPB      cefepime   IVPB 2000 milliGRAM(s) IV Intermittent every 8 hours  dextrose 5%. 1000 milliLiter(s) (50 mL/Hr) IV Continuous <Continuous>  dextrose 5%. 1000 milliLiter(s) (100 mL/Hr) IV Continuous <Continuous>  dextrose 50% Injectable 25 Gram(s) IV Push once  dextrose 50% Injectable 12.5 Gram(s) IV Push once  dextrose 50% Injectable 25 Gram(s) IV Push once  glucagon  Injectable 1 milliGRAM(s) IntraMuscular once  heparin   Injectable 5000 Unit(s) SubCutaneous every 12 hours  hydrALAZINE 25 milliGRAM(s) Oral three times a day  influenza  Vaccine (HIGH DOSE) 0.7 milliLiter(s) IntraMuscular once  insulin glargine Injectable (LANTUS) 32 Unit(s) SubCutaneous every morning  insulin lispro (ADMELOG) corrective regimen sliding scale   SubCutaneous three times a day before meals  insulin lispro (ADMELOG) corrective regimen sliding scale   SubCutaneous at bedtime  insulin lispro Injectable (ADMELOG) 10 Unit(s) SubCutaneous three times a day before meals  losartan 100 milliGRAM(s) Oral daily  metoprolol tartrate 25 milliGRAM(s) Oral two times a day  multivitamin 1 Tablet(s) Oral daily  NIFEdipine XL 30 milliGRAM(s) Oral daily    MEDICATIONS  (PRN):  acetaminophen     Tablet .. 650 milliGRAM(s) Oral every 6 hours PRN Temp greater or equal to 38C (100.4F), Mild Pain (1 - 3)  ALBUTerol    90 MICROgram(s) HFA Inhaler 1 Puff(s) Inhalation every 6 hours PRN Shortness of Breath  cyclobenzaprine 5 milliGRAM(s) Oral three times a day PRN Muscle Spasm  dextrose Oral Gel 15 Gram(s) Oral once PRN Blood Glucose LESS THAN 70 milliGRAM(s)/deciliter  melatonin 3 milliGRAM(s) Oral at bedtime PRN Insomnia  ondansetron Injectable 4 milliGRAM(s) IV Push every 8 hours PRN Nausea and/or Vomiting    LABS:                        11.6   9.21  )-----------( 244      ( 17 Oct 2022 05:50 )             37.0     10-17    138  |  97<L>  |  13  ----------------------------<  119<H>  3.9   |  33<H>  |  0.71    Ca    8.7      17 Oct 2022 05:50  Phos  3.0     10-17  Mg     2.20     10-17          CAPILLARY BLOOD GLUCOSE      POCT Blood Glucose.: 90 mg/dL (17 Oct 2022 16:37)  POCT Blood Glucose.: 110 mg/dL (17 Oct 2022 11:54)  POCT Blood Glucose.: 144 mg/dL (17 Oct 2022 07:28)  POCT Blood Glucose.: 97 mg/dL (16 Oct 2022 22:10)          REVIEW OF SYSTEMS:  CONSTITUTIONAL: No fever, weight loss, or fatigue  EYES: No eye pain, visual disturbances, or discharge  ENMT:  No difficulty hearing, tinnitus, vertigo; No sinus or throat pain  NECK: No pain or stiffness  RESPIRATORY: No cough, wheezing, chills or hemoptysis; No shortness of breath  CARDIOVASCULAR: No chest pain, palpitations, dizziness, or leg swelling  GASTROINTESTINAL: No abdominal or epigastric pain. No nausea, vomiting, or hematemesis; No diarrhea or constipation. No melena or hematochezia.  GENITOURINARY: No dysuria, frequency, hematuria, or incontinence      Consultant(s) Notes Reviewed:  [x ] YES  [ ] NO    PHYSICAL EXAM:  GENERAL: NAD, Oxygen NC   HEAD:  Atraumatic, Normocephalic  NECK: Supple, No JVD, Normal thyroid  NERVOUS SYSTEM:  Alert & Oriented X3, No focal deficit   CHEST/LUNG: Good air entry bilateral with few   rales,   HEART: Regular rate and rhythm; No murmurs, rubs, or gallops  ABDOMEN: Soft, Nontender, Nondistended; Bowel sounds present  EXTREMITIES:  2+ Peripheral Pulses, No clubbing, cyanosis, or edema  SKIN: No rashes or lesions    Care Discussed with Consultants/Other Providers [ x] YES  [ ] NO

## 2022-10-17 NOTE — DIETITIAN INITIAL EVALUATION ADULT - ENTER FROM (G/KG)
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 Pearl. We talked about some other options for treatment at this point since his myasthenia is not controlled. They include the followin. Increase Imuran to dose of 250 mg daily.  2. Discontinue plasmapheresis and reinstitute a trial of IVIG at a higher dose, likely 1 g/kg every 2 weeks.  3. Dr. Pearl would be happy to see him in consultation also. I will sign that referral.   1

## 2022-10-17 NOTE — PROGRESS NOTE ADULT - CONVERSATION DETAILS
Had detailed discussion . Said will try first everything ( Full code) but if not helping then should stop .

## 2022-10-17 NOTE — CONSULT NOTE ADULT - SUBJECTIVE AND OBJECTIVE BOX
89yo pmh aneurysm, HTN, DM Type 2, HLD, h/o Covid-19  (residual symptoms, home oxygen intermittently, NC 2L/min PRN, diagnosed with chronic lung disease 2/2 covid-19) admitted for hemoptysis with negative EGD and bronch, no further episodes of bleeding now c/o R ear, neck pain. No ear drainage.       PAST MEDICAL & SURGICAL HISTORY:  Diabetes mellitus  Hypertension  COVID-19    No significant past surgical history    Allergies  penicillin (Unknown)    Vital Signs Last 24 Hrs  T(C): 36.7 (17 Oct 2022 16:10), Max: 37.2 (17 Oct 2022 02:30)  T(F): 98 (17 Oct 2022 16:10), Max: 98.9 (17 Oct 2022 02:30)  HR: 90 (17 Oct 2022 16:10) (81 - 91)  BP: 143/86 (17 Oct 2022 16:10) (143/86 - 161/84)  BP(mean): --  RR: 16 (17 Oct 2022 16:10) (16 - 18)  SpO2: 98% (17 Oct 2022 16:10) (91% - 100%)    Parameters below as of 17 Oct 2022 16:10  Patient On (Oxygen Delivery Method): nasal cannula  O2 Flow (L/min): 2                          11.6   9.21  )-----------( 244      ( 17 Oct 2022 05:50 )             37.0   10-17    138  |  97<L>  |  13  ----------------------------<  119<H>  3.9   |  33<H>  |  0.71    Ca    8.7      17 Oct 2022 05:50  Phos  3.0     10-17  Mg     2.20     10-17    I&O's Detail    16 Oct 2022 07:01  -  17 Oct 2022 07:00  --------------------------------------------------------  IN:    Oral Fluid: 400 mL  Total IN: 400 mL    OUT:    Voided (mL): 900 mL  Total OUT: 900 mL    Total NET: -500 mL                  PHYSICAL EXAM:  Gen: NAD, laying in bed with NC in place   Skin: No rashes, bruises, or lesions  Head: Normocephalic, Atraumatic  Face: no edema, erythema, or fluctuance.    Eyes: no scleral injection  Ears: Right -  external ear without abnormalities             Left - external ear without abnormalities   Nose: Nares bilaterally patent, no discharge  Mouth: No stridor, no drooling, no trismus.  small granulated scar noted on left lateral aspect of tongue. Mucosa moist, tongue/uvula midline, oropharynx clear  Neck: Flat, supple,trachea midline   Resp:  no stridor         89yo pmh aneurysm, HTN, DM Type 2, HLD, h/o Covid-19  (residual symptoms, home oxygen intermittently, NC 2L/min PRN, diagnosed with chronic lung disease 2/2 covid-19) admitted for hemoptysis with negative EGD and bronch, no further episodes of bleeding now c/o R ear, neck pain x1 week. No ear drainage, no hearing issues. Reports pain has improved.    Afebrile  No leukocytosis  Of note has been on IV cefepime for presumed PNA       PAST MEDICAL & SURGICAL HISTORY:  Diabetes mellitus  Hypertension  COVID-19    No significant past surgical history    Allergies  penicillin (Unknown)    Vital Signs Last 24 Hrs  T(C): 36.7 (17 Oct 2022 16:10), Max: 37.2 (17 Oct 2022 02:30)  T(F): 98 (17 Oct 2022 16:10), Max: 98.9 (17 Oct 2022 02:30)  HR: 90 (17 Oct 2022 16:10) (81 - 91)  BP: 143/86 (17 Oct 2022 16:10) (143/86 - 161/84)  BP(mean): --  RR: 16 (17 Oct 2022 16:10) (16 - 18)  SpO2: 98% (17 Oct 2022 16:10) (91% - 100%)    Parameters below as of 17 Oct 2022 16:10  Patient On (Oxygen Delivery Method): nasal cannula  O2 Flow (L/min): 2                          11.6   9.21  )-----------( 244      ( 17 Oct 2022 05:50 )             37.0   10-17    138  |  97<L>  |  13  ----------------------------<  119<H>  3.9   |  33<H>  |  0.71    Ca    8.7      17 Oct 2022 05:50  Phos  3.0     10-17  Mg     2.20     10-17    I&O's Detail    16 Oct 2022 07:01  -  17 Oct 2022 07:00  --------------------------------------------------------  IN:    Oral Fluid: 400 mL  Total IN: 400 mL    OUT:    Voided (mL): 900 mL  Total OUT: 900 mL    Total NET: -500 mL                  PHYSICAL EXAM:  Gen: NAD, laying in bed with NC in place   Skin: No rashes, bruises, or lesions  Head: Normocephalic, Atraumatic  Face: no edema, erythema, or fluctuance.    Eyes: no scleral injection  Ears: Right -  external ear without abnormalities             Left - external ear without abnormalities   Nose: Nares bilaterally patent, no discharge  Mouth: No stridor, no drooling, no trismus.  small granulated scar noted on left lateral aspect of tongue. Mucosa moist, tongue/uvula midline, oropharynx clear  Neck: R parotid region indurated, warm to touch, no overlying skin changes, no fluctuance. Soft/flat   Resp:  no stridor

## 2022-10-17 NOTE — CONSULT NOTE ADULT - PROBLEM SELECTOR RECOMMENDATION 9
she is no tin heart failure: [facility] resendiz: she might have underlying air trapping: start Symbicort: and albuterol prn
- Consider GI eval   - Recommend w/u for HA   - continue management per primary team   - Please page [phone] and call [phone] with questions/ concerns
- Consider GI eval   - Recommend w/u for HA   - continue management per primary team   - Please page [phone] and call [phone] with questions/ concerns

## 2022-10-17 NOTE — DIETITIAN INITIAL EVALUATION ADULT - PERTINENT MEDS FT
MEDICATIONS  (STANDING):  aspirin enteric coated 81 milliGRAM(s) Oral daily  atorvastatin 40 milliGRAM(s) Oral at bedtime  budesonide 160 MICROgram(s)/formoterol 4.5 MICROgram(s) Inhaler 2 Puff(s) Inhalation two times a day  cefepime   IVPB      cefepime   IVPB 2000 milliGRAM(s) IV Intermittent every 8 hours  dextrose 5%. 1000 milliLiter(s) (50 mL/Hr) IV Continuous <Continuous>  dextrose 5%. 1000 milliLiter(s) (100 mL/Hr) IV Continuous <Continuous>  dextrose 50% Injectable 25 Gram(s) IV Push once  dextrose 50% Injectable 12.5 Gram(s) IV Push once  dextrose 50% Injectable 25 Gram(s) IV Push once  glucagon  Injectable 1 milliGRAM(s) IntraMuscular once  heparin   Injectable 5000 Unit(s) SubCutaneous every 12 hours  hydrALAZINE 25 milliGRAM(s) Oral three times a day  influenza  Vaccine (HIGH DOSE) 0.7 milliLiter(s) IntraMuscular once  insulin glargine Injectable (LANTUS) 32 Unit(s) SubCutaneous every morning  insulin lispro (ADMELOG) corrective regimen sliding scale   SubCutaneous three times a day before meals  insulin lispro (ADMELOG) corrective regimen sliding scale   SubCutaneous at bedtime  insulin lispro Injectable (ADMELOG) 10 Unit(s) SubCutaneous three times a day before meals  losartan 100 milliGRAM(s) Oral daily  metoprolol tartrate 25 milliGRAM(s) Oral two times a day  multivitamin 1 Tablet(s) Oral daily  NIFEdipine XL 30 milliGRAM(s) Oral daily    MEDICATIONS  (PRN):  acetaminophen     Tablet .. 650 milliGRAM(s) Oral every 6 hours PRN Temp greater or equal to 38C (100.4F), Mild Pain (1 - 3)  ALBUTerol    90 MICROgram(s) HFA Inhaler 1 Puff(s) Inhalation every 6 hours PRN Shortness of Breath  cyclobenzaprine 5 milliGRAM(s) Oral three times a day PRN Muscle Spasm  dextrose Oral Gel 15 Gram(s) Oral once PRN Blood Glucose LESS THAN 70 milliGRAM(s)/deciliter  melatonin 3 milliGRAM(s) Oral at bedtime PRN Insomnia  ondansetron Injectable 4 milliGRAM(s) IV Push every 8 hours PRN Nausea and/or Vomiting

## 2022-10-17 NOTE — DIETITIAN INITIAL EVALUATION ADULT - ORAL INTAKE PTA/DIET HISTORY
PTA patient with good appetite, eats a diet high in carbohydrates mostly rice and a lot of snacks. PTA patient taking magnesium, vitamin D, zinc, vitamin C, and fishoil supplements. Patient checks fingersticks at home and are usually between 120-130mg/dL.

## 2022-10-18 ENCOUNTER — TRANSCRIPTION ENCOUNTER (OUTPATIENT)
Age: 72
End: 2022-10-18

## 2022-10-18 VITALS
SYSTOLIC BLOOD PRESSURE: 142 MMHG | HEART RATE: 78 BPM | TEMPERATURE: 98 F | RESPIRATION RATE: 18 BRPM | OXYGEN SATURATION: 100 % | DIASTOLIC BLOOD PRESSURE: 86 MMHG

## 2022-10-18 LAB
ANION GAP SERPL CALC-SCNC: 10 MMOL/L — SIGNIFICANT CHANGE UP (ref 7–14)
BUN SERPL-MCNC: 14 MG/DL — SIGNIFICANT CHANGE UP (ref 7–23)
CALCIUM SERPL-MCNC: 9.4 MG/DL — SIGNIFICANT CHANGE UP (ref 8.4–10.5)
CHLORIDE SERPL-SCNC: 94 MMOL/L — LOW (ref 98–107)
CO2 SERPL-SCNC: 31 MMOL/L — SIGNIFICANT CHANGE UP (ref 22–31)
CREAT SERPL-MCNC: 0.78 MG/DL — SIGNIFICANT CHANGE UP (ref 0.5–1.3)
EGFR: 81 ML/MIN/1.73M2 — SIGNIFICANT CHANGE UP
GLUCOSE BLDC GLUCOMTR-MCNC: 102 MG/DL — HIGH (ref 70–99)
GLUCOSE BLDC GLUCOMTR-MCNC: 120 MG/DL — HIGH (ref 70–99)
GLUCOSE BLDC GLUCOMTR-MCNC: 215 MG/DL — HIGH (ref 70–99)
GLUCOSE SERPL-MCNC: 102 MG/DL — HIGH (ref 70–99)
HCT VFR BLD CALC: 37.6 % — SIGNIFICANT CHANGE UP (ref 34.5–45)
HGB BLD-MCNC: 11.7 G/DL — SIGNIFICANT CHANGE UP (ref 11.5–15.5)
MAGNESIUM SERPL-MCNC: 2.4 MG/DL — SIGNIFICANT CHANGE UP (ref 1.6–2.6)
MCHC RBC-ENTMCNC: 27.3 PG — SIGNIFICANT CHANGE UP (ref 27–34)
MCHC RBC-ENTMCNC: 31.1 GM/DL — LOW (ref 32–36)
MCV RBC AUTO: 87.9 FL — SIGNIFICANT CHANGE UP (ref 80–100)
NRBC # BLD: 0 /100 WBCS — SIGNIFICANT CHANGE UP (ref 0–0)
NRBC # FLD: 0 K/UL — SIGNIFICANT CHANGE UP (ref 0–0)
PHOSPHATE SERPL-MCNC: 3.6 MG/DL — SIGNIFICANT CHANGE UP (ref 2.5–4.5)
PLATELET # BLD AUTO: 258 K/UL — SIGNIFICANT CHANGE UP (ref 150–400)
POTASSIUM SERPL-MCNC: 4.1 MMOL/L — SIGNIFICANT CHANGE UP (ref 3.5–5.3)
POTASSIUM SERPL-SCNC: 4.1 MMOL/L — SIGNIFICANT CHANGE UP (ref 3.5–5.3)
RBC # BLD: 4.28 M/UL — SIGNIFICANT CHANGE UP (ref 3.8–5.2)
RBC # FLD: 14.6 % — HIGH (ref 10.3–14.5)
SODIUM SERPL-SCNC: 135 MMOL/L — SIGNIFICANT CHANGE UP (ref 135–145)
WBC # BLD: 9.34 K/UL — SIGNIFICANT CHANGE UP (ref 3.8–10.5)
WBC # FLD AUTO: 9.34 K/UL — SIGNIFICANT CHANGE UP (ref 3.8–10.5)

## 2022-10-18 PROCEDURE — 99232 SBSQ HOSP IP/OBS MODERATE 35: CPT

## 2022-10-18 RX ORDER — BUDESONIDE AND FORMOTEROL FUMARATE DIHYDRATE 160; 4.5 UG/1; UG/1
2 AEROSOL RESPIRATORY (INHALATION)
Qty: 1 | Refills: 0
Start: 2022-10-18 | End: 2022-11-16

## 2022-10-18 RX ORDER — NIFEDIPINE 30 MG
1 TABLET, EXTENDED RELEASE 24 HR ORAL
Qty: 30 | Refills: 0
Start: 2022-10-18 | End: 2022-11-16

## 2022-10-18 RX ORDER — LANOLIN ALCOHOL/MO/W.PET/CERES
1 CREAM (GRAM) TOPICAL
Qty: 0 | Refills: 0 | DISCHARGE
Start: 2022-10-18

## 2022-10-18 RX ORDER — ACETAMINOPHEN 500 MG
2 TABLET ORAL
Qty: 0 | Refills: 0 | DISCHARGE
Start: 2022-10-18

## 2022-10-18 RX ADMIN — Medication 1 TABLET(S): at 11:46

## 2022-10-18 RX ADMIN — Medication 30 MILLIGRAM(S): at 04:51

## 2022-10-18 RX ADMIN — CEFEPIME 100 MILLIGRAM(S): 1 INJECTION, POWDER, FOR SOLUTION INTRAMUSCULAR; INTRAVENOUS at 13:46

## 2022-10-18 RX ADMIN — HEPARIN SODIUM 5000 UNIT(S): 5000 INJECTION INTRAVENOUS; SUBCUTANEOUS at 05:02

## 2022-10-18 RX ADMIN — INSULIN GLARGINE 32 UNIT(S): 100 INJECTION, SOLUTION SUBCUTANEOUS at 08:03

## 2022-10-18 RX ADMIN — Medication 10 UNIT(S): at 11:45

## 2022-10-18 RX ADMIN — CEFEPIME 100 MILLIGRAM(S): 1 INJECTION, POWDER, FOR SOLUTION INTRAMUSCULAR; INTRAVENOUS at 05:02

## 2022-10-18 RX ADMIN — BUDESONIDE AND FORMOTEROL FUMARATE DIHYDRATE 2 PUFF(S): 160; 4.5 AEROSOL RESPIRATORY (INHALATION) at 08:04

## 2022-10-18 RX ADMIN — Medication 25 MILLIGRAM(S): at 02:37

## 2022-10-18 RX ADMIN — Medication 81 MILLIGRAM(S): at 11:46

## 2022-10-18 RX ADMIN — Medication 25 MILLIGRAM(S): at 17:00

## 2022-10-18 RX ADMIN — HEPARIN SODIUM 5000 UNIT(S): 5000 INJECTION INTRAVENOUS; SUBCUTANEOUS at 16:59

## 2022-10-18 RX ADMIN — Medication 25 MILLIGRAM(S): at 16:59

## 2022-10-18 RX ADMIN — Medication 25 MILLIGRAM(S): at 08:04

## 2022-10-18 RX ADMIN — Medication 10 UNIT(S): at 08:05

## 2022-10-18 RX ADMIN — Medication 4: at 11:46

## 2022-10-18 RX ADMIN — Medication 25 MILLIGRAM(S): at 04:52

## 2022-10-18 RX ADMIN — Medication 10 UNIT(S): at 16:59

## 2022-10-18 RX ADMIN — LOSARTAN POTASSIUM 100 MILLIGRAM(S): 100 TABLET, FILM COATED ORAL at 04:51

## 2022-10-18 NOTE — PROGRESS NOTE ADULT - PROBLEM SELECTOR PROBLEM 4
Stroke
Type 2 diabetes mellitus with hyperglycemia, with long-term current use of insulin
Stroke
Stroke

## 2022-10-18 NOTE — PROGRESS NOTE ADULT - PROBLEM SELECTOR PROBLEM 2
Acute systolic congestive heart failure
Acute systolic congestive heart failure
Hemoptysis
Acute systolic congestive heart failure

## 2022-10-18 NOTE — PROGRESS NOTE ADULT - PROBLEM SELECTOR PLAN 3
Monitor and control
no more : for bronch in AM  10/13: for bronch today : [facility] thoracic surgeon    10/14: no bleeding noted: ent to see:    10/16: vasculitis workup orders: no hemoptysis anymore
no more : for bronch in AM  10/13: for bronch today : [facility] thoracic surgeon    10/14: no bleeding noted: ent to see:    10/16: vasculitis workup orders: no hemoptysis anymore    10/18: resolved : rf I snormal : rest pendign
Monitor and control
Monitor and control
no more : for bronch in AM  10/13: for bronch today : [facility] surgeon    10/14: no bleeding noted: ent to see:    10/16: vasculitis workup orders: no hemoptysis anymore    10/17: resolved : rf I s normal : rest pending  10/18: antibodies are still p neding

## 2022-10-18 NOTE — DISCHARGE NOTE PROVIDER - PROVIDER TOKENS
FREE:[LAST:[Abdiel Ledezma],PHONE:[(   )    -],FAX:[(   )    -],ADDRESS:[Your PCP]] FREE:[LAST:[Abdiel Ledezma],PHONE:[(   )    -],FAX:[(   )    -],ADDRESS:[Your PCP]],PROVIDER:[TOKEN:[[id number]],ESTABLISHEDPATIENT:[T]]

## 2022-10-18 NOTE — DISCHARGE NOTE PROVIDER - NSDCMRMEDTOKEN_GEN_ALL_CORE_FT
aspirin 81 mg oral delayed release tablet: 1 tab(s) orally once a day  atorvastatin 40 mg oral tablet: 1 tab(s) orally once a day  hydrALAZINE 25 mg oral tablet: 1 tab(s) orally 3 times a day  Lantus 100 units/mL subcutaneous solution: 40 unit(s) subcutaneous once a day (at bedtime)  losartan 100 mg oral tablet: 1 tab(s) orally once a day  metoprolol tartrate 25 mg oral tablet: 1 tab(s) orally 2 times a day  NovoLOG 100 units/mL injectable solution: 25 unit(s) injectable 3 times a day (with meals)   acetaminophen 325 mg oral tablet: 2 tab(s) orally every 6 hours, As needed, Temp greater or equal to 38C (100.4F), Mild Pain (1 - 3)  aspirin 81 mg oral delayed release tablet: 1 tab(s) orally once a day  atorvastatin 40 mg oral tablet: 1 tab(s) orally once a day  hydrALAZINE 25 mg oral tablet: 1 tab(s) orally 3 times a day  Lantus 100 units/mL subcutaneous solution: 40 unit(s) subcutaneous once a day (at bedtime)  losartan 100 mg oral tablet: 1 tab(s) orally once a day  melatonin 3 mg oral tablet: 1 tab(s) orally once a day (at bedtime), As needed, Insomnia  metoprolol tartrate 25 mg oral tablet: 1 tab(s) orally 2 times a day  Multiple Vitamins oral tablet: 1 tab(s) orally once a day  NovoLOG 100 units/mL injectable solution: 25 unit(s) injectable 3 times a day (with meals)   acetaminophen 325 mg oral tablet: 2 tab(s) orally every 6 hours, As needed, Temp greater or equal to 38C (100.4F), Mild Pain (1 - 3)  aspirin 81 mg oral delayed release tablet: 1 tab(s) orally once a day  atorvastatin 40 mg oral tablet: 1 tab(s) orally once a day  budesonide-formoterol 160 mcg-4.5 mcg/inh inhalation aerosol: 2 puff(s) inhaled 2 times a day   hydrALAZINE 25 mg oral tablet: 1 tab(s) orally 3 times a day  Lantus 100 units/mL subcutaneous solution: 40 unit(s) subcutaneous once a day (at bedtime)  levoFLOXacin 500 mg oral tablet: 1 tab(s) orally once a day   losartan 100 mg oral tablet: 1 tab(s) orally once a day  melatonin 3 mg oral tablet: 1 tab(s) orally once a day (at bedtime), As needed, Insomnia  metoprolol tartrate 25 mg oral tablet: 1 tab(s) orally 2 times a day  Multiple Vitamins oral tablet: 1 tab(s) orally once a day  NIFEdipine 30 mg oral tablet, extended release: 1 tab(s) orally once a day  NovoLOG 100 units/mL injectable solution: 25 unit(s) injectable 3 times a day (with meals)

## 2022-10-18 NOTE — PROGRESS NOTE ADULT - ASSESSMENT
Ms Leavitt is a 72 yo Female with PMHx aneurysm, HTN, DM Type 2, HLD, h/o Covid-19  (residual symptoms, home oxygen intermittently, NC 2L/min PRN, diagnosed with chronic lung disease 2/2 covid-19) who presented to the ED with c/o coughing up bright red blood x 2 episodes on 10/11. Reports 3 weeks of coughing with worsening SOB and associated worsening bilateral lower extremity swelling. Pt was admitted for workup of possible CHF and hemoptysis. So far ECHO, Bronch and Flex larngyoscopy negative. Pt has developed severe headache and neck pain and was being worked up for that when she spiked fever overnight 12/14 and 12/15 and ID consulted for the same.     WORKUP  UA (10/15): Nitrite: Negative, Leuk Esterase: Large WBC 69 /HPF / Bacteria: Few  RVP: Negative x 2  CXR (10/10): No pneumothorax. Questionable left infiltrate  CTA Chest and CT a/p with Con (!0/10): Cardiomegaly and moderate pulmonary edema.  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   ·	Allergy: Penicillin     Afebrile on admission, however spiked a fever to 102 overnight 10/14  w/o leukocytosis   CXR with questionable infiltrate due to significant pulm edema  noted to have Pulmonary edema on CT scan on admission, however ECHO was with normal biventricular function.  Hemoptysis resolved and Bronch and flex laryngoscopy negative    CTA acute right infarct. CT 10/16 no infarct.     Unclear source for fever which now appear to have resolved  on empiric cefepime 2gm Q8   Possible PNA  Improved    Suggest:  can transition to levofloxacin 500 mg po q 24 h x 3 days    call if further questions  decrease cefepime 1 gm iv q 8 h

## 2022-10-18 NOTE — PROGRESS NOTE ADULT - REASON FOR ADMISSION
Hemoptysis

## 2022-10-18 NOTE — PROGRESS NOTE ADULT - PROBLEM SELECTOR PROBLEM 1
Acute systolic congestive heart failure
Fever
Acute systolic congestive heart failure
Fever
Acute systolic congestive heart failure
Fever

## 2022-10-18 NOTE — PROGRESS NOTE ADULT - PROBLEM SELECTOR PLAN 1
seems OK: no sob: no cough :no phlegm : no hemoptysis: seems to have  airway trapping on talking to rake: no pulm edema: cotn BD : needs full PFT
she has been having fever and is on empiric rx for suspected pneumonia: she had bronchoscopy done; no bleeding noted: Her airways looked very clean: no pus or thick mucopurulent secretions seen : vasculitis work up sent; selvin lira ch per primary cards team    10/17: she is nto sob: but desaturated: on oxygen today  : no further hemoptysis: no fever: on cefepime:
seems OK: no sob: no cough :no phlegm : no hemoptysis: seems to have  airway trapping on talking to rake: no pulm edema: cotn BD : needs full PFT    10/13: breathing wise she is OK: for bronchoscopy today :    10/14: no hemoptysis: s/p bronch: no bleeding noted:
seems OK: no sob: no cough :no phlegm : no hemoptysis: seems to have  airway trapping on talking to rake: no pulm edema: cotn BD : needs full PFT    10/13: breathing wise she is OK: for bronchoscopy today :
she has been having fever and is on empiric rx for suspected pneumonia: she had bronchoscopy done; no bleeding noted: Her airways looked very clean: no pus or thick mucopurulent secretions seen : vasculitis work up sent; noti n ch per primary cards team
she has been having fever and is on empiric rx for suspected pneumonia: she had bronchoscopy done; no bleeding noted: Her airways looked very clean: no pus or thick mucopurulent secretions seen : vasculitis work up sent; selvin lira ch per primary cards team    10/17: she is nto sob: but desaturated: on oxygen today  : no further hemoptysis: no fever: on cefepime:    10/18: she has no fever now: on cefepime

## 2022-10-18 NOTE — DISCHARGE NOTE PROVIDER - CARE PROVIDER_API CALL
Abdiel Ledezma,   Your PCP  Phone: (   )    -  Fax: (   )    -  Follow Up Time:    Abdiel Ledezma,   Your PCP  Phone: (   )    -  Fax: (   )    -  Follow Up Time:     Milton Olivarez)  Critical Care Medicine; Internal Medicine; Pulmonary Disease  [id number] [id number]  Phone: [phone]  Fax: [phone]  Established Patient  Follow Up Time:

## 2022-10-18 NOTE — DISCHARGE NOTE PROVIDER - NSDCCPCAREPLAN_GEN_ALL_CORE_FT
PRINCIPAL DISCHARGE DIAGNOSIS  Diagnosis: Hemoptysis  Assessment and Plan of Treatment: You had a bronchoscopy. No bleeding, pus, or secretions were seen.  You also had a CT scan of your chest while in the hospital which showed pulmonary edema. Follow-up with your pulmonologist for the results of your vasculitis work-up. You have an appointment established with  (pulmonologist) on November 9th at 8:30am. Please call [phone] for further information. Follow-up with your primary care provider within 1-2 weeks of discharge.      SECONDARY DISCHARGE DIAGNOSES  Diagnosis: Fever  Assessment and Plan of Treatment: You were given IV antibiotics and you improved. Please continue to take Levofloxacin as directed for three days.    Diagnosis: Type 2 diabetes mellitus with hyperglycemia, with long-term current use of insulin  Assessment and Plan of Treatment: Continue consistent carbohydrate diet.  Monitor blood glucose levels throughout the day before meals and at bedtime.  Record blood sugars and bring to outpatient providers appointment in order to be reviewed by your doctor for management modifications.  Be aware of diabetes management symptoms including feeling cool and clammy may be related to low glucose levels.  Feeling hot and dry may indicate high glucose levels.  If  you feel these symptoms, check your blood sugar.  Make regular podiatry appointments in order to have feet checked for wounds and toe nails cut by a doctor to prevent infections.    Diagnosis: Right facial pain  Assessment and Plan of Treatment: You had a CT scan of your head which was unremarkable.  You were seen by ENT for right facial pain. You may massage your right cheek and use warm compresses every four hours to help with the pain. You may also take Advil as needed. Follow-up with your primary care doctor within 1-2 weeks of discharge.     PRINCIPAL DISCHARGE DIAGNOSIS  Diagnosis: Hemoptysis  Assessment and Plan of Treatment: You had a bronchoscopy. No bleeding, pus, or secretions were seen.  You also had a CT scan of your chest while in the hospital which showed pulmonary edema. Follow-up with your pulmonologist for the results of your vasculitis work-up. You have an appointment established with  (pulmonologist) on November 9th at 8:30am. Please call [phone] for further information. Follow-up with your primary care provider within 1-2 weeks of discharge.      SECONDARY DISCHARGE DIAGNOSES  Diagnosis: Type 2 diabetes mellitus with hyperglycemia, with long-term current use of insulin  Assessment and Plan of Treatment: Continue consistent carbohydrate diet.  Monitor blood glucose levels throughout the day before meals and at bedtime.  Record blood sugars and bring to outpatient providers appointment in order to be reviewed by your doctor for management modifications.  Be aware of diabetes management symptoms including feeling cool and clammy may be related to low glucose levels.  Feeling hot and dry may indicate high glucose levels.  If  you feel these symptoms, check your blood sugar.  Make regular podiatry appointments in order to have feet checked for wounds and toe nails cut by a doctor to prevent infections.    Diagnosis: Fever  Assessment and Plan of Treatment: You were given IV antibiotics and you improved. Please continue to take Levofloxacin as directed for three days.    Diagnosis: Right facial pain  Assessment and Plan of Treatment: You had a CT scan of your head which was unremarkable.  You were seen by ENT for right facial pain. You may massage your right cheek and use warm compresses every four hours to help with the pain. You may also take Advil as needed. Follow-up with your primary care doctor within 1-2 weeks of discharge.

## 2022-10-18 NOTE — PROGRESS NOTE ADULT - PROVIDER SPECIALTY LIST ADULT
Infectious Disease
Thoracic Surgery
Thoracic Surgery
Cardiology
Infectious Disease
Internal Medicine
Neurology
Pulmonology
Cardiology
Cardiology
Infectious Disease
Internal Medicine
Internal Medicine
Neurology
Neurology
Cardiology
Internal Medicine
Pulmonology
Pulmonology
Internal Medicine
Pulmonology

## 2022-10-18 NOTE — DISCHARGE NOTE PROVIDER - HOSPITAL COURSE
72yo Female with MHx aneurysm, HTN, DM Type 2, HLD, h/o Covid-19  (residual symptoms, home oxygen intermittently, NC 2L/min PRN, diagnosed with chronic lung disease 2/2 covid-19) c/o coughing up bright red blood today x 2 episodes. Pt provides a white towel with several bloody stains (2x2cm) from home and a bottle with a blood clot (0.5x2cm in size).  No prior episodes like that in the past. Reports 3 weeks of coughing with worsening SOB. Also reports associated worsening bilateral lower extremity swelling. States has had no palpitations, CP, fevers, Abd pain, n/v/d; Of note, patient was treated with Azithromycin x 5 days by PCP with no relief. Reports no recent travel, no hx of TB, non-smoker. I feel fine today .      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   ·  Plan: -On Oxygen .  Cardiology and pulmonary  helping.   < from: Transthoracic Echocardiogram (10.11.22 @ 07:20) >  CONCLUSIONS:  1. Mitral annular calcification, otherwise normal mitral  valve. Minimal mitral regurgitation.  2. Endocardium not well visualized; grossly normal left  ventricular systolic function.  3. The right ventricle is not well visualized; grossly  normal right ventricular systolic function.    < end of copied text >       Problem/Plan - 2:  ·  Problem: Hemoptysis.   ·  Plan: S/P  Bronchoscopy and ENT evaluation.   CTA chest: No main, right main, left main, lobar or segmental pulmonary embolism. Limited evaluation of subsegmental pulmonary arteries secondary to motion and mixing artifact. Patent central airways. Diffuse bilateral   groundglass opacities and interlobular septal thickening consistent with pulmonary edema. No pleural effusion.  -Hold Heparin for DVT  -Pulmonology following.   -Supp O2 NC  -VS q4h  -Elev HOB.     Problem/Plan - 3:  ·  Problem: Type 2 diabetes mellitus with hyperglycemia, with long-term current use of insulin.   ·  Plan: -Sugars fine.  Conservative insulin dosage: Lantus 40u -->32u in AM, Novolog 25u TID --> 10u TID while inpt  -monitor closely FS (pt states that skips insulin sometimes due to low BG)   -moderate IASS  -a1c  -DM diet.     Problem/Plan - 4:  ·  Problem: Headache and Neck Pain .   ·  Plan: Now resolved. Rpt CT head results pending.   Neurology helping.   < from: CT Angio Head w/ IV Cont (10.15.22 @ 11:24) >  IMPRESSION:  CT BRAIN:  Acute right cerebellar infarct suspected as described above.    CT ANGIOGRAPHY NECK:  No evidence of hemodynamically significant stenosis using NASCET   criteria. Patent vertebral arteries. No evidence of vascular dissection.    CT ANGIOGRAPHY BRAIN:  No major vessel occlusion or proximal stenosis.    < end of copied text >       Problem/Plan - 5:  ·  Problem: FEVER .   ·  Plan: Id Helping . Abxs started.    70yo Female with MHx aneurysm, HTN, DM Type 2, HLD, h/o Covid-19  (residual symptoms, home oxygen intermittently, NC 2L/min PRN, diagnosed with chronic lung disease 2/2 covid-19) c/o coughing up bright red. No prior episodes like that in the past. Reports 3 weeks of coughing with worsening SOB. Also reports associated worsening bilateral lower extremity swelling.     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   < from: Transthoracic Echocardiogram (10.11.22 @ 07:20) >  CONCLUSIONS:  1. Mitral annular calcification, otherwise normal mitral  valve. Minimal mitral regurgitation.  2. Endocardium not well visualized; grossly normal left  ventricular systolic function.  3. The right ventricle is not well visualized; grossly  normal right ventricular systolic function.    < end of copied text >       Problem/Plan - 2:  ·  Problem: Hemoptysis.   ·  Plan: S/P  Bronchoscopy and ENT evaluation.   CTA chest: No main, right main, left main, lobar or segmental pulmonary embolism. Limited evaluation of subsegmental pulmonary arteries secondary to motion and mixing artifact. Patent central airways. Diffuse bilateral   groundglass opacities and interlobular septal thickening consistent with pulmonary edema. No pleural effusion.  -Hold Heparin for DVT  -Pulmonology following.   -Supp O2 NC  -VS q4h  -Elev HOB.     Problem/Plan - 3:  ·  Problem: Type 2 diabetes mellitus with hyperglycemia, with long-term current use of insulin.   ·  Plan: -Sugars fine.  Conservative insulin dosage: Lantus 40u -->32u in AM, Novolog 25u TID --> 10u TID while inpt  -monitor closely FS (pt states that skips insulin sometimes due to low BG)   -moderate IASS  -a1c  -DM diet.     Problem/Plan - 4:  ·  Problem: Headache and Neck Pain .   ·  Plan: Now resolved. Rpt CT head results pending.   Neurology helping.   < from: CT Angio Head w/ IV Cont (10.15.22 @ 11:24) >  IMPRESSION:  CT BRAIN:  Acute right cerebellar infarct suspected as described above.    CT ANGIOGRAPHY NECK:  No evidence of hemodynamically significant stenosis using NASCET   criteria. Patent vertebral arteries. No evidence of vascular dissection.    CT ANGIOGRAPHY BRAIN:  No major vessel occlusion or proximal stenosis.    < end of copied text >       Problem/Plan - 5:  ·  Problem: FEVER .   ·  Plan: Id Helping . Abxs started.    70yo Female with MHx aneurysm, HTN, DM Type 2, HLD, h/o Covid-19  (residual symptoms, home oxygen intermittently, NC 2L/min PRN, diagnosed with chronic lung disease 2/2 covid-19) c/o coughing up bright red. No prior episodes like that in the past. Reports 3 weeks of coughing with worsening SOB. Also reports associated worsening bilateral lower extremity swelling.     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   < from: Transthoracic Echocardiogram (10.11.22 @ 07:20) >  CONCLUSIONS:  1. Mitral annular calcification, otherwise normal mitral  valve. Minimal mitral regurgitation.  2. Endocardium not well visualized; grossly normal left  ventricular systolic function.  3. The right ventricle is not well visualized; grossly  normal right ventricular systolic function.    < end of copied text >      Hemoptysis.   - S/P  Bronchoscopy and ENT evaluation.   - CTA chest: No main, right main, left main, lobar or segmental pulmonary embolism. Limited evaluation of subsegmental pulmonary arteries secondary to motion and mixing artifact. Patent central airways. Diffuse bilateral groundglass opacities and interlobular septal thickening consistent with pulmonary edema. No pleural effusion.  - Hold Heparin for DVT  - Patient was seen by the pulmonlogy team Pulmonology following.      Problem/Plan - 3:  ·  Problem: Type 2 diabetes mellitus with hyperglycemia, with long-term current use of insulin.   ·  Plan: -Sugars fine.  Conservative insulin dosage: Lantus 40u -->32u in AM, Novolog 25u TID --> 10u TID while inpt  -monitor closely FS (pt states that skips insulin sometimes due to low BG)   -moderate IASS  -a1c  -DM diet.     Problem/Plan - 4:  ·  Problem: Headache and Neck Pain .   ·  Plan: Now resolved. Rpt CT head results pending.   Neurology helping.   < from: CT Angio Head w/ IV Cont (10.15.22 @ 11:24) >  IMPRESSION:  CT BRAIN:  Acute right cerebellar infarct suspected as described above.    CT ANGIOGRAPHY NECK:  No evidence of hemodynamically significant stenosis using NASCET   criteria. Patent vertebral arteries. No evidence of vascular dissection.    CT ANGIOGRAPHY BRAIN:  No major vessel occlusion or proximal stenosis.    < end of copied text >       Problem/Plan - 5:  ·  Problem: FEVER .   ·  Plan: Id Helping . Abxs started.    70yo Female with MHx aneurysm, HTN, DM Type 2, HLD, h/o Covid-19  (residual symptoms, home oxygen intermittently, NC 2L/min PRN, diagnosed with chronic lung disease 2/2 covid-19) c/o coughing up bright red. No prior episodes like that in the past. Reports 3 weeks of coughing with worsening SOB. Also reports associated worsening bilateral lower extremity swelling.     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 Grossly normal LV and RV systolic function      Hemoptysis.   - S/P  Bronchoscopy and ENT evaluation. No bleeding noted: Airways looked very clean: no pus or thick mucopurulent secretions seen.  - CTA chest 10/11: No main, right main, left main, lobar or segmental pulmonary embolism. Limited evaluation of subsegmental pulmonary arteries secondary to motion and mixing artifact. Patent central airways. Diffuse bilateral groundglass opacities and interlobular septal thickening consistent with pulmonary edema. No pleural effusion.  - Hold Heparin for DVT  - Patient was seen by the pulmonlogy team Pulmonology following.     Type 2 diabetes mellitus with hyperglycemia, with long-term current use of insulin.   -Sugars fine.  -Conservative insulin dosage: Lantus 40u -->32u in AM, Novolog 25u TID --> 10u TID while inpt  -monitor closely FS (pt states that skips insulin sometimes due to low BG)   -moderate IASS  -a1c 7.9%  -DM diet.    Headache and Neck Pain .   CT Angio Head/neck 10/15: Acute right cerebellar infarct suspected. No evidence of hemodynamically significant stenosis. Patent vertebral arteries. No evidence of vascular dissection. No major vessel occlusion or proximal stenosis.   repeat CT head 10/16: No definite infarct in the right cerebellum, previous lucency may be related to artifact on 10/15/2022.     R facial pain.   -ENT consulted  -Bilateral ear exam unremarkable. R preauricular and neck region indurated, warm to touch, no drainage from R parotid duct.   - Massage to R parotid gland q4 hours for 15 minutes while awake  - Warm compresses to R parotid gland q4 hours for 20 minutes while awake  - Sialagogues (lemon or sour candies) q4 hours while awake  - NSAIDs for pain if tolerated    Fever   Unclear source for fever which now appear to have resolved. S?p empiric cefepime 2gm Q8. Possible PNA Now Improved  Continue PO levofloxacin 500mg q24 x3 days on discharge       Patient is medically cleared for discharge on 10/18/22. Case discussed with Dr. Harper.    72yo Female with MHx aneurysm, HTN, DM Type 2, HLD, h/o Covid-19  (residual symptoms, home oxygen intermittently, NC 2L/min PRN, diagnosed with chronic lung disease 2/2 covid-19) c/o coughing up bright red. No prior episodes like that in the past. Reports 3 weeks of coughing with worsening SOB. Also reports associated worsening bilateral lower extremity swelling.     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 Grossly normal LV and RV systolic function      Hemoptysis.   - S/P  Bronchoscopy and ENT evaluation. No bleeding noted: Airways looked very clean: no pus or thick mucopurulent secretions seen.  - CTA chest 10/11: No main, right main, left main, lobar or segmental pulmonary embolism. Limited evaluation of subsegmental pulmonary arteries secondary to motion and mixing artifact. Patent central airways. Diffuse bilateral groundglass opacities and interlobular septal thickening consistent with pulmonary edema. No pleural effusion.  - Hold Heparin for DVT  - Patient was seen by the pulmonlogy team Pulmonology following.     Type 2 diabetes mellitus with hyperglycemia, with long-term current use of insulin.   -Sugars fine.  -Conservative insulin dosage: Lantus 40u -->32u in AM, Novolog 25u TID --> 10u TID while inpt  -monitor closely FS (pt states that skips insulin sometimes due to low BG)   -moderate IASS  -a1c 7.9%  -DM diet.    Headache and Neck Pain .   CT Angio Head/neck 10/15: Acute right cerebellar infarct suspected. No evidence of hemodynamically significant stenosis. Patent vertebral arteries. No evidence of vascular dissection. No major vessel occlusion or proximal stenosis.   repeat CT head 10/16: No definite infarct in the right cerebellum, previous lucency may be related to artifact on 10/15/2022.     R facial pain.   - ENT consulted  - Bilateral ear exam unremarkable. R preauricular and neck region indurated, warm to touch, no drainage from R parotid duct.   - Massage to R parotid gland q4 hours for 15 minutes while awake  - Warm compresses to R parotid gland q4 hours for 20 minutes while awake  - Sialagogues (lemon or sour candies) q4 hours while awake  - NSAIDs for pain if tolerated    Fever   Unclear source for fever which now appear to have resolved. S/p empiric cefepime 2gm Q8. Possible PNA Now Improved  Continue PO levofloxacin 500mg q24 x3 days on discharge       Patient is medically cleared for discharge on 10/18/22. Case discussed with Dr. Harper.

## 2022-10-18 NOTE — DISCHARGE NOTE NURSING/CASE MANAGEMENT/SOCIAL WORK - PATIENT PORTAL LINK FT
You can access the FollowMyHealth Patient Portal offered by [facility] by registering at the following website: [email or web address]. By joining [employer]’s FollowMyHealth portal, you will also be able to view your health information using other applications (apps) compatible with our system.

## 2022-10-18 NOTE — PROGRESS NOTE ADULT - PROBLEM SELECTOR PROBLEM 3
Hemoptysis
Hemoptysis
Type 2 diabetes mellitus with hyperglycemia, with long-term current use of insulin
Hemoptysis
Type 2 diabetes mellitus with hyperglycemia, with long-term current use of insulin
Type 2 diabetes mellitus with hyperglycemia, with long-term current use of insulin

## 2022-10-18 NOTE — PROGRESS NOTE ADULT - SUBJECTIVE AND OBJECTIVE BOX
Follow Up:  fever    Interval History/ROS:   feels well asking to go home  afebrile   no further hemoptysis      Allergies  penicillin (Unknown)        ANTIMICROBIALS:  cefepime   IVPB 1000 every 8 hours      MEDICATIONS  (STANDING):  acetaminophen     Tablet .. 650 every 6 hours PRN  ALBUTerol    90 MICROgram(s) HFA Inhaler 1 every 6 hours PRN  aspirin enteric coated 81 daily  atorvastatin 40 at bedtime  budesonide 160 MICROgram(s)/formoterol 4.5 MICROgram(s) Inhaler 2 two times a day  cyclobenzaprine 5 three times a day PRN  dextrose 50% Injectable 25 once  dextrose 50% Injectable 12.5 once  dextrose 50% Injectable 25 once  dextrose Oral Gel 15 once PRN  glucagon  Injectable 1 once  heparin   Injectable 5000 every 12 hours  hydrALAZINE 25 three times a day  influenza  Vaccine (HIGH DOSE) 0.7 once  insulin glargine Injectable (LANTUS) 32 every morning  insulin lispro (ADMELOG) corrective regimen sliding scale  three times a day before meals  insulin lispro (ADMELOG) corrective regimen sliding scale  at bedtime  insulin lispro Injectable (ADMELOG) 10 three times a day before meals  losartan 100 daily  melatonin 3 at bedtime PRN  metoprolol tartrate 25 two times a day  NIFEdipine XL 30 daily    Vital Signs Last 24 Hrs  T(F): 97.8 (10-18-22 @ 12:22), Max: 98.3 (10-18-22 @ 05:00)  HR: 81 (10-18-22 @ 12:22)  BP: 147/88 (10-18-22 @ 12:22)  RR: 18 (10-18-22 @ 12:22)  SpO2: 100% (10-18-22 @ 12:22) (94% - 100%)      PHYSICAL EXAM:  Constitutional: Non toxic   Eyes: No icterus.  Oral cavity: Clear, no lesions  Neck: Supple  RS: no distress nasal O2  CVS: S1, S2   Abdomen: Soft. No guarding/rigidity/tenderness.  : external cath  Skin: No rash  Neuro: Alert, oriented to time/place/person  Cranial nerves 2-12 grossly normal. No focal abnormalities                                     11.7   9.34  )-----------( 258      ( 18 Oct 2022 06:17 )             37.6 10-18    135  |  94  |  14  ----------------------------<  102  4.1   |  31  |  0.78  Ca    9.4      18 Oct 2022 06:17Phos  3.6     10-18Mg     2.40     10-18            MICROBIOLOGY:    .Blood Blood-Peripheral  10-15-22   No growth to date.  --  --      .Blood Blood-Venous  10-14-22   No growth to date.  --  --      .Blood Blood-Peripheral  10-14-22   No growth to date.  --  --          Rapid RVP Result: NotDetec (10-15 @ 00:27)  Rapid RVP Result: NotDetec (10-10 @ 19:09)        RADIOLOGY:    r< from: CT Head No Cont (10.16.22 @ 09:19) >  IMPRESSION: No definite infarct in the right cerebellum, previous lucency   may be related to artifact on 10/15/2022.    --- End of Report ---        < end of copied text >  < from: CT Angio Neck w/ IV Cont (10.15.22 @ 11:23) >  IMPRESSION:  CT BRAIN:  Acute right cerebellar infarct suspected as described above.    CT ANGIOGRAPHY NECK:  No evidence of hemodynamically significant stenosis using NASCET   criteria. Patent vertebral arteries. No evidence of vascular dissection.    CT ANGIOGRAPHY BRAIN:  No major vessel occlusion or proximal stenosis.  1.  --- End of Report ---    < end of copied text >  < from: Xray Chest 1 View- PORTABLE-Routine (Xray Chest 1 View- PORTABLE-Routine in AM.) (10.14.22 @ 08:25) >  IMPRESSION:  No pneumothorax. Questionable left infiltrate. Follow-up study is   recommended as clinically warranted.      < end of copied text >

## 2022-10-18 NOTE — PROGRESS NOTE ADULT - SUBJECTIVE AND OBJECTIVE BOX
[facility]      Seen earlier today Date of service   No new neurological symptoms reported. Remains stable neurologically.   - Today, patient is without complaints.          *****MEDICATIONS: Current medication reviewed and documented.    MEDICATIONS  (STANDING):  aspirin enteric coated 81 milliGRAM(s) Oral daily  atorvastatin 40 milliGRAM(s) Oral at bedtime  budesonide 160 MICROgram(s)/formoterol 4.5 MICROgram(s) Inhaler 2 Puff(s) Inhalation two times a day  cefepime   IVPB 1000 milliGRAM(s) IV Intermittent every 8 hours  dextrose 5%. 1000 milliLiter(s) (50 mL/Hr) IV Continuous <Continuous>  dextrose 5%. 1000 milliLiter(s) (100 mL/Hr) IV Continuous <Continuous>  dextrose 50% Injectable 25 Gram(s) IV Push once  dextrose 50% Injectable 25 Gram(s) IV Push once  dextrose 50% Injectable 12.5 Gram(s) IV Push once  glucagon  Injectable 1 milliGRAM(s) IntraMuscular once  heparin   Injectable 5000 Unit(s) SubCutaneous every 12 hours  hydrALAZINE 25 milliGRAM(s) Oral three times a day  influenza  Vaccine (HIGH DOSE) 0.7 milliLiter(s) IntraMuscular once  insulin glargine Injectable (LANTUS) 32 Unit(s) SubCutaneous every morning  insulin lispro (ADMELOG) corrective regimen sliding scale   SubCutaneous three times a day before meals  insulin lispro (ADMELOG) corrective regimen sliding scale   SubCutaneous at bedtime  insulin lispro Injectable (ADMELOG) 10 Unit(s) SubCutaneous three times a day before meals  losartan 100 milliGRAM(s) Oral daily  metoprolol tartrate 25 milliGRAM(s) Oral two times a day  multivitamin 1 Tablet(s) Oral daily  NIFEdipine XL 30 milliGRAM(s) Oral daily    MEDICATIONS  (PRN):  acetaminophen     Tablet .. 650 milliGRAM(s) Oral every 6 hours PRN Temp greater or equal to 38C (100.4F), Mild Pain (1 - 3)  ALBUTerol    90 MICROgram(s) HFA Inhaler 1 Puff(s) Inhalation every 6 hours PRN Shortness of Breath  cyclobenzaprine 5 milliGRAM(s) Oral three times a day PRN Muscle Spasm  dextrose Oral Gel 15 Gram(s) Oral once PRN Blood Glucose LESS THAN 70 milliGRAM(s)/deciliter  melatonin 3 milliGRAM(s) Oral at bedtime PRN Insomnia  ondansetron Injectable 4 milliGRAM(s) IV Push every 8 hours PRN Nausea and/or Vomiting          ***** VITAL SIGNS:   Vital Signs Last 24 Hrs       T(F): 97.8 (10-18-22 @ 12:22), Max: 98.3 (10-18-22 @ 05:00)  HR: 81 (10-18-22 @ 12:22) (68 - 93)  BP: 147/88 (10-18-22 @ 12:22) (140/89 - 148/97)  RR: 18 (10-18-22 @ 12:22) (16 - 18)  SpO2: 100% (10-18-22 @ 12:22) (94% - 100%)  Wt(kg): --  I&O's Summary    17 Oct 2022 07:01  -  18 Oct 2022 07:00  --------------------------------------------------------  IN: 400 mL / OUT: 400 mL / NET: 0 mL             *****PHYSICAL EXAM:   alert oriented x 3 attention comprehension are fair.  Able to name, repeat.   EOmi fundi not visualized   no nystagmus VFF to confrontation  Tongue is midline  Palate elevates symmetrically   Moving all 4 ext spontaneously no drift appreciated    Gait not assessed.            *****LAB AND IMAGIN.7   9.34  )-----------( 258      ( 18 Oct 2022 06:17 )             37.6               10-18    135  |  94<L>  |  14  ----------------------------<  102<H>  4.1   |  31  |  0.78    Ca    9.4      18 Oct 2022 06:17  Phos  3.6     10-18  Mg     2.40     10-18                           [All pertinent recent Imaging/Reports reviewed]            *****A S S E S S M E N T   A N D   P L A N :   Excerpt from H&P,"     72yo Female with MHx aneurysm, HTN, DM Type 2, HLD, h/o Covid-19  (residual symptoms, home oxygen intermittently, NC 2L/min PRN, diagnosed with chronic lung disease 2/2 covid-19) c/o coughing up bright red blood today x 2 episodes. Pt provides a white towel with several bloody stains (2x2cm) from home and a bottle with a blood clot (0.5x2cm in size).  No prior episodes like that in the past. Reports 3 weeks of coughing with worsening SOB. Also reports associated worsening bilateral lower extremity swelling. States has had no palpitations, CP, fevers, Abd pain, n/v/d; Of note, patient was treated with Azithromycin x 5 days by PCP with no relief. Reports no recent travel, no hx of TB, non-smoker. (11 Oct 2022 04:44)       Problem/Recommendations 1:  headache  r sided     bp goal normotensive gradually   hx of aneurysm   reglan iv   headache resolved      ct head no acute path   no large vessel occlusion noted   nih 0     Problem/Recommendations 2: coughing   presented with hemoptysis now resolved     Problem/Recommendations 3: fever   likely aspiration   on abx per iD          Thank you for allowing me to participate in the care of this patient. Will continue to follow patient periodically. Please do not hesitate to call me if you have any  questions or if there has been a change in patients neurological status     ________________  Angelica Jewell MD  [facility] ([facility]  [phone]      33 minutes spent on total encounter; more than 50 % of the visit was  spent counseling about plan of care, compliance to diet/exercise and medication regimen and or  coordinating care by the attending physician.      It is advised that stroke patients follow up with ANGELO Crowell @ [phone] in 1- 2 weeks.   Others please follow up with Dr. Michael Nissenbaum [phone]

## 2022-10-18 NOTE — DISCHARGE NOTE PROVIDER - NSDCFUADDAPPT_GEN_ALL_CORE_FT
You have an appointment estbalished with  (pulmonologist) on November 9th at 8:30am. Please call [phone] for further information.     Please call and follow up with your primary care provider within 1 - 2 weeks of discharge from the hospital.  You have an appointment established with  (pulmonologist) on November 9th at 8:30am. Please call [phone] for further information.     Please call and follow up with your primary care provider within 1 - 2 weeks of discharge from the hospital.

## 2022-10-18 NOTE — DISCHARGE NOTE NURSING/CASE MANAGEMENT/SOCIAL WORK - NSDCPEFALRISK_GEN_ALL_CORE
For information on Fall & Injury Prevention, visit: [email or web address] OR  [email or web address] OR  https://www.cdc.gov/steadi/patient.html

## 2022-10-18 NOTE — PROGRESS NOTE ADULT - PROBLEM SELECTOR PLAN 2
no more : for bronch in AM  10/13: for bronch today : [facility] surgeon
no more : for bronch in AM  10/13: for bronch today : [facility] surgeon    10/14: no bleeding noted: ent to see:
no more : fo rbronch in AM
seems OK: no sob: no cough :no phlegm : no hemoptysis: seems to have  airway trapping on talking to rake: no pulm edema: cotn BD : needs full PFT    10/13: breathing wise she is OK: for bronchoscopy today :    10/14: no hemoptysis: s/p bronch: no bleeding noted:    10/16: per ards    10/17: per cards
seems OK: no sob: no cough :no phlegm : no hemoptysis: seems to have  airway trapping on talking to rake: no pulm edema: cotn BD : needs full PFT    10/13: breathing wise she is OK: for bronchoscopy today :    10/14: no hemoptysis: s/p bronch: no bleeding noted:    10/16: per ards
seems OK: no sob: no cough :no phlegm : no hemoptysis: seems to have  airway trapping on talking to rake: no pulm edema: cotn BD : needs full PFT    10/13: breathing wise she is OK: for bronchoscopy today :    10/14: no hemoptysis: s/p bronch: no bleeding noted:    10/16: per ards    10/17: per cards    10/18: seems stab;e: lungs are clear: no wheezing: on 2 L of oxygen

## 2022-10-18 NOTE — PROGRESS NOTE ADULT - SUBJECTIVE AND OBJECTIVE BOX
Date of Service: 10-18-22 @ 15:39    Patient is a 71y old  Female who presents with a chief complaint of Hemoptysis (18 Oct 2022 14:50)      Any change in ROS: no hemoptysis : she feels OK:  since post covid she is on home oxygen:  she feels OK:     MEDICATIONS  (STANDING):  aspirin enteric coated 81 milliGRAM(s) Oral daily  atorvastatin 40 milliGRAM(s) Oral at bedtime  budesonide 160 MICROgram(s)/formoterol 4.5 MICROgram(s) Inhaler 2 Puff(s) Inhalation two times a day  cefepime   IVPB 1000 milliGRAM(s) IV Intermittent every 8 hours  dextrose 5%. 1000 milliLiter(s) (50 mL/Hr) IV Continuous <Continuous>  dextrose 5%. 1000 milliLiter(s) (100 mL/Hr) IV Continuous <Continuous>  dextrose 50% Injectable 25 Gram(s) IV Push once  dextrose 50% Injectable 25 Gram(s) IV Push once  dextrose 50% Injectable 12.5 Gram(s) IV Push once  glucagon  Injectable 1 milliGRAM(s) IntraMuscular once  heparin   Injectable 5000 Unit(s) SubCutaneous every 12 hours  hydrALAZINE 25 milliGRAM(s) Oral three times a day  influenza  Vaccine (HIGH DOSE) 0.7 milliLiter(s) IntraMuscular once  insulin glargine Injectable (LANTUS) 32 Unit(s) SubCutaneous every morning  insulin lispro (ADMELOG) corrective regimen sliding scale   SubCutaneous three times a day before meals  insulin lispro (ADMELOG) corrective regimen sliding scale   SubCutaneous at bedtime  insulin lispro Injectable (ADMELOG) 10 Unit(s) SubCutaneous three times a day before meals  losartan 100 milliGRAM(s) Oral daily  metoprolol tartrate 25 milliGRAM(s) Oral two times a day  multivitamin 1 Tablet(s) Oral daily  NIFEdipine XL 30 milliGRAM(s) Oral daily    MEDICATIONS  (PRN):  acetaminophen     Tablet .. 650 milliGRAM(s) Oral every 6 hours PRN Temp greater or equal to 38C (100.4F), Mild Pain (1 - 3)  ALBUTerol    90 MICROgram(s) HFA Inhaler 1 Puff(s) Inhalation every 6 hours PRN Shortness of Breath  cyclobenzaprine 5 milliGRAM(s) Oral three times a day PRN Muscle Spasm  dextrose Oral Gel 15 Gram(s) Oral once PRN Blood Glucose LESS THAN 70 milliGRAM(s)/deciliter  melatonin 3 milliGRAM(s) Oral at bedtime PRN Insomnia  ondansetron Injectable 4 milliGRAM(s) IV Push every 8 hours PRN Nausea and/or Vomiting    Vital Signs Last 24 Hrs  T(C): 36.6 (18 Oct 2022 12:22), Max: 36.8 (18 Oct 2022 05:00)  T(F): 97.8 (18 Oct 2022 12:22), Max: 98.3 (18 Oct 2022 05:00)  HR: 81 (18 Oct 2022 12:22) (68 - 93)  BP: 147/88 (18 Oct 2022 12:22) (140/89 - 148/97)  BP(mean): --  RR: 18 (18 Oct 2022 12:22) (16 - 18)  SpO2: 100% (18 Oct 2022 12:22) (94% - 100%)    Parameters below as of 18 Oct 2022 12:22  Patient On (Oxygen Delivery Method): nasal cannula  O2 Flow (L/min): 2      I&O's Summary    17 Oct 2022 07:01  -  18 Oct 2022 07:00  --------------------------------------------------------  IN: 400 mL / OUT: 400 mL / NET: 0 mL          Physical Exam:   GENERAL: Obese  HEENT: NORMA/   Atraumatic, Normocephalic  ENMT: No tonsillar erythema, exudates, or enlargement; Moist mucous membranes, Good dentition, No lesions  NECK: Supple, No JVD, Normal thyroid  CHEST/LUNG: Clear to auscultaion  CVS: Regular rate and rhythm; No murmurs, rubs, or gallops  GI: : Soft, Nontender, Nondistended; Bowel sounds present  NERVOUS SYSTEM:  Alert & Oriented X3  EXTREMITIES: *edema  LYMPH: No lymphadenopathy noted  SKIN: No rashes or lesions  ENDOCRINOLOGY: No Thyromegaly  PSYCH: Appropriate    Labs:                              11.7   9.34  )-----------( 258      ( 18 Oct 2022 06:17 )             37.6                         11.6   9.21  )-----------( 244      ( 17 Oct 2022 05:50 )             37.0                         11.6   9.69  )-----------( 252      ( 16 Oct 2022 05:55 )             38.4                         12.2   9.54  )-----------( 265      ( 15 Oct 2022 06:42 )             40.1                         11.4   9.39  )-----------( 257      ( 14 Oct 2022 23:30 )             37.4     10-18    135  |  94<L>  |  14  ----------------------------<  102<H>  4.1   |  31  |  0.78  10-17    138  |  97<L>  |  13  ----------------------------<  119<H>  3.9   |  33<H>  |  0.71  10-16    137  |  100  |  13  ----------------------------<  122<H>  4.3   |  32<H>  |  0.81  10-15    134<L>  |  95<L>  |  15  ----------------------------<  124<H>  4.2   |  30  |  0.94    Ca    9.4      18 Oct 2022 06:17  Ca    8.7      17 Oct 2022 05:50  Phos  3.6     10-18  Phos  3.0     10-17  Mg     2.40     10-18  Mg     2.20     10-17      CAPILLARY BLOOD GLUCOSE      POCT Blood Glucose.: 215 mg/dL (18 Oct 2022 11:36)  POCT Blood Glucose.: 120 mg/dL (18 Oct 2022 07:53)  POCT Blood Glucose.: 113 mg/dL (17 Oct 2022 22:15)  POCT Blood Glucose.: 90 mg/dL (17 Oct 2022 16:37)      rad< from: CT Angio Head w/ IV Cont (10.15.22 @ 11:24) >  There is no evidence for significant stenosis, major vessel occlusion, or   aneurysm involving the vertebrobasilar system.    No enlarged vascular lesions or clusters of abnormal vessels are noted to   suggest an arterial venous malformation within the field-of-view.    Visualized portions of the superficial and deep venous systems are   unremarkable.      IMPRESSION:  CT BRAIN:  Acute right cerebellar infarct suspected as described above.    CT ANGIOGRAPHY NECK:  No evidence of hemodynamically significant stenosis using NASCET   criteria. Patent vertebral arteries. No evidence of vascular dissection.    CT ANGIOGRAPHY BRAIN:  No major vessel occlusion or proximal stenosis.  1.  --- End of Report ---          ALFRED SAENZ MD; Resident Radiologist  This document has been electronically signed.  ROSSY RODRIGUEZ MD; Attending Radiologist  This document has been electronically signed. Oct 15 2022 11:56AM    < end of copied text >  < from: CT Angio Neck w/ IV Cont (10.15.22 @ 11:23) >  Status post bilaterallens replacement.    Paranasal sinuses and mastoid air cells are clear. Calvarium is intact.      CT ANGIOGRAPHY NECK:  There is no evidence for significant stenosis or major vessel occlusion   involving the bilateral carotid arteries.    There is noevidence for significant stenosis or major vessel occlusion   involving the bilateral vertebral arteries.    The neck and upper chest are unremarkable.    Visualized osseous structures are unremarkable.      CT ANGIOGRAPHY BRAIN:  Evaluation of the anterior circulation is limited by artifact from   embolic material.    Grossly, there is no evidence for significant stenosis, major vessel   occlusion, or aneurysm about the [place].    < end of copied text >        Procalcitonin, Serum: 0.04 ng/mL (10-14 @ 23:30)  Lactate, Blood: 1.1 mmol/L (10-14 @ 23:30)        RECENT CULTURES:  10-15 @ 10:00 .Blood Blood-Peripheral                No growth to date.    10-14 @ 23:45 .Blood Blood-Venous                No growth to date.    10-14 @ 23:30 .Blood Blood-Peripheral                No growth to date.          RESPIRATORY CULTURES:          Studies  Chest X-RAY  CT SCAN Chest   Venous Dopplers: LE:   CT Abdomen  Others

## 2022-10-19 ENCOUNTER — FORM ENCOUNTER (OUTPATIENT)
Age: 72
End: 2022-10-19

## 2022-10-19 LAB
AUTO DIFF PNL BLD: ABNORMAL
C-ANCA SER-ACNC: NEGATIVE — SIGNIFICANT CHANGE UP
DSDNA AB FLD-ACNC: <0.2 AI — SIGNIFICANT CHANGE UP
ENA SCL70 AB SER-ACNC: <0.2 AI — SIGNIFICANT CHANGE UP
ENA SS-A AB FLD IA-ACNC: <0.2 AI — SIGNIFICANT CHANGE UP
MPO AB + PR3 PNL SER: SIGNIFICANT CHANGE UP
P-ANCA SER-ACNC: NEGATIVE — SIGNIFICANT CHANGE UP

## 2022-10-20 LAB
ANA PAT FLD IF-IMP: ABNORMAL
ANA TITR SER: ABNORMAL
CCP IGG SERPL-ACNC: 9 UNITS — SIGNIFICANT CHANGE UP
CULTURE RESULTS: SIGNIFICANT CHANGE UP
DSDNA AB SER-ACNC: 26 IU/ML — SIGNIFICANT CHANGE UP
RF+CCP IGG SER-IMP: NEGATIVE — SIGNIFICANT CHANGE UP
SPECIMEN SOURCE: SIGNIFICANT CHANGE UP

## 2022-10-27 PROBLEM — U07.1 COVID-19: Chronic | Status: ACTIVE | Noted: 2022-10-10

## 2022-10-27 PROBLEM — E11.9 TYPE 2 DIABETES MELLITUS WITHOUT COMPLICATIONS: Chronic | Status: ACTIVE | Noted: 2022-10-10

## 2022-10-27 PROBLEM — I10 ESSENTIAL (PRIMARY) HYPERTENSION: Chronic | Status: ACTIVE | Noted: 2022-10-10

## 2022-11-01 ENCOUNTER — APPOINTMENT (OUTPATIENT)
Age: 72
End: 2022-11-01

## 2022-11-01 DIAGNOSIS — Z83.3 FAMILY HISTORY OF DIABETES MELLITUS: ICD-10-CM

## 2022-11-01 DIAGNOSIS — Z80.1 FAMILY HISTORY OF MALIGNANT NEOPLASM OF TRACHEA, BRONCHUS AND LUNG: ICD-10-CM

## 2022-11-01 DIAGNOSIS — I50.9 HEART FAILURE, UNSPECIFIED: ICD-10-CM

## 2022-11-01 DIAGNOSIS — I10 ESSENTIAL (PRIMARY) HYPERTENSION: ICD-10-CM

## 2022-11-01 DIAGNOSIS — Z86.16 PERSONAL HISTORY OF COVID-19: ICD-10-CM

## 2022-11-01 DIAGNOSIS — J98.4 OTHER DISORDERS OF LUNG: ICD-10-CM

## 2022-11-01 DIAGNOSIS — E11.9 TYPE 2 DIABETES MELLITUS W/OUT COMPLICATIONS: ICD-10-CM

## 2022-11-01 PROCEDURE — 99349 HOME/RES VST EST MOD MDM 40: CPT | Mod: 95

## 2022-11-02 PROBLEM — E11.9 TYPE 2 DIABETES MELLITUS: Status: ACTIVE | Noted: 2022-11-02

## 2022-11-02 PROBLEM — Z83.3 FAMILY HISTORY OF DIABETES MELLITUS: Status: ACTIVE | Noted: 2022-11-02

## 2022-11-02 PROBLEM — Z86.16 HISTORY OF COVID-19: Status: RESOLVED | Noted: 2022-11-02 | Resolved: 2022-11-02

## 2022-11-02 PROBLEM — Z80.1 FAMILY HISTORY OF LUNG CANCER: Status: ACTIVE | Noted: 2022-11-02

## 2022-11-02 PROBLEM — J98.4 CHRONIC LUNG DISEASE: Status: ACTIVE | Noted: 2022-11-02

## 2022-11-02 PROBLEM — I50.9 CHF (CONGESTIVE HEART FAILURE): Status: ACTIVE | Noted: 2022-11-02

## 2022-11-02 PROBLEM — I10 HTN (HYPERTENSION): Status: ACTIVE | Noted: 2022-11-02

## 2022-11-02 RX ORDER — ATORVASTATIN CALCIUM 40 MG/1
40 TABLET, FILM COATED ORAL
Refills: 0 | Status: ACTIVE | COMMUNITY
Start: 2022-11-02

## 2022-11-02 RX ORDER — HYDRALAZINE HYDROCHLORIDE 25 MG/1
25 TABLET ORAL 3 TIMES DAILY
Refills: 0 | Status: ACTIVE | COMMUNITY
Start: 2022-11-02

## 2022-11-02 RX ORDER — METOPROLOL TARTRATE 25 MG/1
25 TABLET, FILM COATED ORAL TWICE DAILY
Refills: 0 | Status: ACTIVE | COMMUNITY
Start: 2022-11-02

## 2022-11-02 RX ORDER — GLUCOSAMINE HCL/CHONDROITIN SU 500-400 MG
3 CAPSULE ORAL
Refills: 0 | Status: ACTIVE | COMMUNITY
Start: 2022-11-02

## 2022-11-02 RX ORDER — INSULIN ASPART 100 [IU]/ML
100 INJECTION, SOLUTION INTRAVENOUS; SUBCUTANEOUS
Refills: 0 | Status: ACTIVE | COMMUNITY
Start: 2022-11-02

## 2022-11-02 RX ORDER — MULTIVITAMIN
TABLET ORAL
Refills: 0 | Status: ACTIVE | COMMUNITY
Start: 2022-11-02

## 2022-11-02 RX ORDER — LOSARTAN POTASSIUM 100 MG/1
100 TABLET, FILM COATED ORAL DAILY
Refills: 0 | Status: ACTIVE | COMMUNITY
Start: 2022-11-02

## 2022-11-02 RX ORDER — ASPIRIN 81 MG/1
81 TABLET ORAL DAILY
Refills: 0 | Status: ACTIVE | COMMUNITY
Start: 2022-11-02

## 2022-11-02 RX ORDER — BUDESONIDE AND FORMOTEROL FUMARATE DIHYDRATE 160; 4.5 UG/1; UG/1
160-4.5 AEROSOL RESPIRATORY (INHALATION) TWICE DAILY
Refills: 0 | Status: ACTIVE | COMMUNITY
Start: 2022-11-02

## 2022-11-02 RX ORDER — INSULIN GLARGINE 100 [IU]/ML
100 INJECTION, SOLUTION SUBCUTANEOUS
Refills: 0 | Status: ACTIVE | COMMUNITY
Start: 2022-11-02

## 2022-11-02 RX ORDER — FUROSEMIDE 40 MG/1
40 TABLET ORAL
Refills: 0 | Status: ACTIVE | COMMUNITY
Start: 2022-11-02

## 2022-11-04 NOTE — PLAN
[FreeTextEntry1] : [employer] TCM STARS teaching: Enforced with patient need for daily weights. Advised to call for an increase of greater than 2 lbs. in a day or 5 pounds in a week. Advised patient to adhere to all medications including demonstrating proper use of inhalers and nebulizers. Encourage the use of proper breathing techniques such as pursed lip breathing or leaning forward during exhalation. Patient understands proper use of oxygen therapy. Increase activity as tolerated and maintain optimal activity levels. Continue coughing and deep breathing exercises including use of Incentive Spirometry. Receive routine pneumococcal and influenza vaccinations.  Adhere to low salt diet and educated patient on foods that should be avoided such as processed or fast food. Follow up with outpatient providers. Contact information given, patient advised to call with any questions/concerns.

## 2022-11-04 NOTE — REVIEW OF SYSTEMS
[Lower Ext Edema] : lower extremity edema [Dyspnea on Exertion] : dyspnea on exertion [Negative] : Neurological [FreeTextEntry6] : o

## 2022-11-04 NOTE — HISTORY OF PRESENT ILLNESS
[Home] : at home, [unfilled] , at the time of the visit. [Other Location: e.g. Home (Enter Location, City,State)___] : at [unfilled] [Family Member] : family member [Verbal consent obtained from patient] : the patient, [unfilled] [FreeTextEntry1] : F/u post hospital discharge STAR Heart Failure  [de-identified] : Ms. Leavitt is a 70yo Female with MHx aneurysm, HTN, DM Type 2, HLD, h/o Covid-19  (residual symptoms, home oxygen intermittently, NC 2L/min PRN, diagnosed with chronic lung disease 2/2 covid-19) c/o coughing up bright red and was admitted from 10/11-10/18 at [facility]. \par \par Ms. Leavitt is seen via telecommunication video visit, appears pleasant, A&O x3 and in nad. Collateral information provided by daughter Yoly. Since discharge to home, feeling better and denies any SOB, CP, palpitations, dyspnea or edema. Continues with supplemental O2 prn, SpO2 92% on RA and improves to 96% on 2LPM. Appetite and cough improving, voiding and +BM. Weights stable 217-220lbs. States follow up with PCP Dr. Meadows on 10/26 went well, nifedipine discontinued and started on Lasix MWF for leg swelling. MRI scheduled 11/3 for head and neck pain, denies any today. Denies any s/s of cva. Medications and discharge instructions reviewed with positive teach back. \par \par Pt has active home care services with [facility] and follows up with PCP Dr. Meadows at [facility], Pulm Dr. Traci Elam at [facility] (11/9) and will schedule f/u with cards. \par \par Educated pt and family on Heart healthy guidelines, dietary and fluid restrictions and daily weights. Contact provided for [facility]. Pt and family offers no other concerns at this time, 24/7 TCM contact provided.\par \par As copied from [facility] discharge document:\par "Discharge Date	18-Oct-2022\par Admission Date	11-Oct-2022 02:40\par Reason for Admission	Hemoptysis\par Hospital Course	\par 70yo Female with MHx aneurysm, HTN, DM Type 2, HLD, h/o Covid-19  (residual symptoms, home oxygen intermittently, NC 2L/min PRN, diagnosed with chronic lung disease 2/2 covid-19) c/o coughing up bright red. No prior episodes like that in the past. Reports 3 weeks of coughing with worsening SOB. Also reports associated worsening bilateral lower extremity swelling. 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 Grossly normal LV and RV systolic function\par \par \par Hemoptysis. \par - S/P  Bronchoscopy and ENT evaluation. No bleeding noted: Airways looked very clean: no pus or thick mucopurulent secretions seen.\par - CTA chest 10/11: No main, right main, left main, lobar or segmental pulmonary embolism. Limited evaluation of subsegmental pulmonary arteries secondary to motion and mixing artifact. Patent central airways. Diffuse bilateral groundglass opacities and interlobular septal thickening consistent with pulmonary edema. No pleural effusion.\par - Hold Heparin for DVT\par - Patient was seen by the pulmonlogy team Pulmonology following. \par \par Type 2 diabetes mellitus with hyperglycemia, with long-term current use of insulin. \par -Sugars fine.\par -Conservative insulin dosage: Lantus 40u -->32u in AM, Novolog 25u TID --> 10u TID while inpt\par -monitor closely FS (pt states that skips insulin sometimes due to low BG) \par -moderate IASS\par -a1c 7.9%\par -DM diet.\par \par Headache and Neck Pain . \par CT Angio Head/neck 10/15: Acute right cerebellar infarct suspected. No evidence of hemodynamically significant stenosis. Patent vertebral arteries. No evidence of vascular dissection. No major vessel occlusion or proximal stenosis. \par repeat CT head 10/16: No definite infarct in the right cerebellum, previous lucency may be related to artifact on 10/15/2022. \par \par R facial pain. \par - ENT consulted\par - Bilateral ear exam unremarkable. R preauricular and neck region indurated, warm to touch, no drainage from R parotid duct. \par - Massage to R parotid gland q4 hours for 15 minutes while awake\par - Warm compresses to R parotid gland q4 hours for 20 minutes while awake\par - Sialagogues (lemon or sour candies) q4 hours while awake\par - NSAIDs for pain if tolerated\par \par Fever \par Unclear source for fever which now appear to have resolved. S/p empiric cefepime 2gm Q8. Possible PNA Now Improved\par Continue PO levofloxacin 500mg q24 x3 days on discharge \par \par \par Patient is medically cleared for discharge on 10/18/22. Case discussed with Dr. Harper."

## 2022-11-04 NOTE — PHYSICAL EXAM
[No Acute Distress] : no acute distress [Well Nourished] : well nourished [Well Developed] : well developed [Well-Appearing] : well-appearing [Normal Sclera/Conjunctiva] : normal sclera/conjunctiva [Normal Outer Ear/Nose] : the outer ears and nose were normal in appearance [No Respiratory Distress] : no respiratory distress  [No Accessory Muscle Use] : no accessory muscle use [Normal Affect] : the affect was normal [Alert and Oriented x3] : oriented to person, place, and time [Normal Insight/Judgement] : insight and judgment were intact [de-identified] : Physical exam limited d/t telehealth encounter

## 2022-11-04 NOTE — HEALTH RISK ASSESSMENT
[With Patient/Caregiver] : , with patient/caregiver [Designated Healthcare Proxy] : Designated healthcare proxy [Name: ___] : Health Care Proxy's Name: [unfilled]  [Relationship: ___] : Relationship: [unfilled] [Never] : Never [No] : No [No falls in past year] : Patient reported no falls in the past year [Assistive Device] : Patient uses an assistive device [de-identified] : cane [AdvancecareDate] : 11/1/2022 [FreeTextEntry4] : Goals of care and MOLST reviewed, pt will discuss further with MD
